# Patient Record
Sex: FEMALE | Race: WHITE | NOT HISPANIC OR LATINO | Employment: OTHER | URBAN - METROPOLITAN AREA
[De-identification: names, ages, dates, MRNs, and addresses within clinical notes are randomized per-mention and may not be internally consistent; named-entity substitution may affect disease eponyms.]

---

## 2018-09-26 ENCOUNTER — OFFICE VISIT (OUTPATIENT)
Dept: FAMILY MEDICINE CLINIC | Facility: CLINIC | Age: 68
End: 2018-09-26
Payer: COMMERCIAL

## 2018-09-26 VITALS
DIASTOLIC BLOOD PRESSURE: 72 MMHG | OXYGEN SATURATION: 98 % | HEIGHT: 65 IN | SYSTOLIC BLOOD PRESSURE: 120 MMHG | BODY MASS INDEX: 26.66 KG/M2 | WEIGHT: 160 LBS | HEART RATE: 73 BPM | TEMPERATURE: 97.9 F

## 2018-09-26 DIAGNOSIS — M54.2 NECK PAIN: ICD-10-CM

## 2018-09-26 DIAGNOSIS — Z00.00 WELL ADULT EXAM: ICD-10-CM

## 2018-09-26 DIAGNOSIS — R05.9 COUGH: Primary | ICD-10-CM

## 2018-09-26 DIAGNOSIS — M25.512 LEFT SHOULDER PAIN, UNSPECIFIED CHRONICITY: ICD-10-CM

## 2018-09-26 PROCEDURE — 1036F TOBACCO NON-USER: CPT | Performed by: FAMILY MEDICINE

## 2018-09-26 PROCEDURE — 1101F PT FALLS ASSESS-DOCD LE1/YR: CPT | Performed by: FAMILY MEDICINE

## 2018-09-26 PROCEDURE — 3008F BODY MASS INDEX DOCD: CPT | Performed by: FAMILY MEDICINE

## 2018-09-26 PROCEDURE — 99203 OFFICE O/P NEW LOW 30 MIN: CPT | Performed by: FAMILY MEDICINE

## 2018-09-26 RX ORDER — AZELASTINE 1 MG/ML
1 SPRAY, METERED NASAL 2 TIMES DAILY
Qty: 30 ML | Refills: 0 | Status: SHIPPED | OUTPATIENT
Start: 2018-09-26 | End: 2020-08-11 | Stop reason: ALTCHOICE

## 2018-09-26 NOTE — PROGRESS NOTES
Assessment/Plan:         Diagnoses and all orders for this visit:    Cough  -     azelastine (ASTELIN) 0 1 % nasal spray; 1 spray into each nostril 2 (two) times a day Use in each nostril as directed  -     CBC and differential; Future  -     Comprehensive metabolic panel; Future  -     Allergy Mold Panel, Complete; Future  -     Ambulatory referral to Pulmonology; Future    Neck pain  -     XR spine cervical 2 or 3 vw injury; Future  -     Lyme Antibody Profile with reflex to WB; Future    Left shoulder pain, unspecified chronicity  -     XR shoulder 2+ vw left; Future  -     Lyme Antibody Profile with reflex to WB; Future    Well adult exam  -     Comprehensive metabolic panel; Future  -     Lipid panel; Future  -     TSH, 3rd generation; Future         patient here with  with multiple complaints has not been seen by primary in a number of years, does not have established relationship with gyn   cough   Discussed multiple causative factors environmental and work related 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[employer], denies any allergic potential but will test to determine, with long history of cough past history of pneumonia will obtain pulmonary function test, will update lipid panel test for Lyme  Neck and shoulder pain  Denies any previous history of trauma has worked retail for number of years, discussed potential for arthritic complaints      Subjective:      Patient ID: Marlene Cerrato is a 80 y o  female      Has not had any primary in yrs , has just used [facility]'s   Here with    Has been having a cough for over 2 yrs maybe longer   Increases after taking a shower ,   Sometimes just a tickle a t other times more productive , or related to perfumes   Neg hx of asthma , seasonal allergies,   Neg smoker , never     Has taken a variety of otc meds from a variety of providers  Dog , cats  , = one cat left   Has history of pneumonia     Also with complaints of left side of neck has been going month  , hurtst to turn neck and at times lift shoulder to a certain point    does not have gyn provider  Has never had a colonoscopy  Denies history of Lyme  Denies history of rash lesion, swelling to joints negative history of rheumatoid      Cough   The problem has been waxing and waning  The cough is non-productive  Associated symptoms include postnasal drip  Pertinent negatives include no sore throat  She has tried OTC cough suppressant and prescription cough suppressant for the symptoms  The following portions of the patient's history were reviewed and updated as appropriate:   She has no past medical history on file  ,   does not have a problem list on file  ,   has no past surgical history on file  ,  family history is not on file  ,   reports that she has never smoked  She has never used smokeless tobacco  Her alcohol and drug histories are not on file  ,  has No Known Allergies         Review of Systems   Constitutional: Negative  HENT: Positive for postnasal drip  Negative for hearing loss, nosebleeds, sore throat and tinnitus  Eyes: Negative  Respiratory: Positive for cough  Cardiovascular: Negative  Gastrointestinal: Negative  Endocrine: Negative  Musculoskeletal: Positive for arthralgias and neck pain  Allergic/Immunologic: Negative  Neurological: Negative  Objective:  Vitals:    09/26/18 1704   BP: 120/72   Pulse: 73   Temp: 97 9 °F (36 6 °C)   SpO2: 98%      Physical Exam   Constitutional: She is oriented to person, place, and time  She appears well-developed and well-nourished  No distress  HENT:   Head: Normocephalic and atraumatic  Right Ear: External ear normal    Left Ear: External ear normal    Mouth/Throat: No oropharyngeal exudate  Eyes: Conjunctivae and EOM are normal  No scleral icterus  Neck: Normal range of motion  Neck supple  Cardiovascular: Normal rate, regular rhythm, normal heart sounds and intact distal pulses  No murmur heard    Superficial varicosities bilateral negative pedal edema   Pulmonary/Chest: Effort normal and breath sounds normal    Negative forced expiratory wheeze   Musculoskeletal: Normal range of motion  Lymphadenopathy:     She has no cervical adenopathy  Neurological: She is alert and oriented to person, place, and time  She has normal reflexes  Skin: Skin is warm and dry  Psychiatric: She has a normal mood and affect   Her behavior is normal  Judgment and thought content normal

## 2018-09-29 ENCOUNTER — HOSPITAL ENCOUNTER (OUTPATIENT)
Dept: RADIOLOGY | Facility: HOSPITAL | Age: 68
Discharge: HOME | End: 2018-09-29
Payer: COMMERCIAL

## 2018-09-29 ENCOUNTER — APPOINTMENT (OUTPATIENT)
Dept: LAB | Facility: HOSPITAL | Age: 68
End: 2018-09-29
Payer: COMMERCIAL

## 2018-09-29 DIAGNOSIS — M54.2 NECK PAIN: ICD-10-CM

## 2018-09-29 DIAGNOSIS — M25.512 LEFT SHOULDER PAIN, UNSPECIFIED CHRONICITY: ICD-10-CM

## 2018-09-29 DIAGNOSIS — R05.9 COUGH: ICD-10-CM

## 2018-09-29 DIAGNOSIS — Z00.00 WELL ADULT EXAM: ICD-10-CM

## 2018-09-29 LAB
ALBUMIN SERPL BCP-MCNC: 3.6 G/DL (ref 3.5–5)
ALP SERPL-CCNC: 78 U/L (ref 46–116)
ALT SERPL W P-5'-P-CCNC: 47 U/L (ref 12–78)
ANION GAP SERPL CALCULATED.3IONS-SCNC: 6 MMOL/L (ref 4–13)
AST SERPL W P-5'-P-CCNC: 29 U/L (ref 5–45)
BASOPHILS # BLD AUTO: 0.04 THOUSANDS/ΜL (ref 0–0.1)
BASOPHILS NFR BLD AUTO: 1 % (ref 0–1)
BILIRUB SERPL-MCNC: 0.5 MG/DL (ref 0.2–1)
BUN SERPL-MCNC: 13 MG/DL (ref 5–25)
CALCIUM SERPL-MCNC: 8.9 MG/DL (ref 8.3–10.1)
CHLORIDE SERPL-SCNC: 104 MMOL/L (ref 100–108)
CHOLEST SERPL-MCNC: 270 MG/DL (ref 50–200)
CO2 SERPL-SCNC: 30 MMOL/L (ref 21–32)
CREAT SERPL-MCNC: 0.82 MG/DL (ref 0.6–1.3)
EOSINOPHIL # BLD AUTO: 0.17 THOUSAND/ΜL (ref 0–0.61)
EOSINOPHIL NFR BLD AUTO: 2 % (ref 0–6)
ERYTHROCYTE [DISTWIDTH] IN BLOOD BY AUTOMATED COUNT: 12.4 % (ref 11.6–15.1)
GFR SERPL CREATININE-BSD FRML MDRD: 74 ML/MIN/1.73SQ M
GLUCOSE P FAST SERPL-MCNC: 106 MG/DL (ref 65–99)
HCT VFR BLD AUTO: 42.5 % (ref 34.8–46.1)
HDLC SERPL-MCNC: 46 MG/DL (ref 40–60)
HGB BLD-MCNC: 13.9 G/DL (ref 11.5–15.4)
IMM GRANULOCYTES # BLD AUTO: 0.04 THOUSAND/UL (ref 0–0.2)
IMM GRANULOCYTES NFR BLD AUTO: 1 % (ref 0–2)
LDLC SERPL CALC-MCNC: 170 MG/DL (ref 0–100)
LYMPHOCYTES # BLD AUTO: 2.37 THOUSANDS/ΜL (ref 0.6–4.47)
LYMPHOCYTES NFR BLD AUTO: 34 % (ref 14–44)
MCH RBC QN AUTO: 29.5 PG (ref 26.8–34.3)
MCHC RBC AUTO-ENTMCNC: 32.7 G/DL (ref 31.4–37.4)
MCV RBC AUTO: 90 FL (ref 82–98)
MONOCYTES # BLD AUTO: 0.54 THOUSAND/ΜL (ref 0.17–1.22)
MONOCYTES NFR BLD AUTO: 8 % (ref 4–12)
NEUTROPHILS # BLD AUTO: 3.85 THOUSANDS/ΜL (ref 1.85–7.62)
NEUTS SEG NFR BLD AUTO: 54 % (ref 43–75)
NONHDLC SERPL-MCNC: 224 MG/DL
NRBC BLD AUTO-RTO: 0 /100 WBCS
PLATELET # BLD AUTO: 260 THOUSANDS/UL (ref 149–390)
PMV BLD AUTO: 12.9 FL (ref 8.9–12.7)
POTASSIUM SERPL-SCNC: 3.8 MMOL/L (ref 3.5–5.3)
PROT SERPL-MCNC: 7.9 G/DL (ref 6.4–8.2)
RBC # BLD AUTO: 4.71 MILLION/UL (ref 3.81–5.12)
SODIUM SERPL-SCNC: 140 MMOL/L (ref 136–145)
TRIGL SERPL-MCNC: 268 MG/DL
TSH SERPL DL<=0.05 MIU/L-ACNC: 4.07 UIU/ML (ref 0.36–3.74)
WBC # BLD AUTO: 7.01 THOUSAND/UL (ref 4.31–10.16)

## 2018-09-29 PROCEDURE — 85025 COMPLETE CBC W/AUTO DIFF WBC: CPT

## 2018-09-29 PROCEDURE — 80053 COMPREHEN METABOLIC PANEL: CPT

## 2018-09-29 PROCEDURE — 72040 X-RAY EXAM NECK SPINE 2-3 VW: CPT

## 2018-09-29 PROCEDURE — 36415 COLL VENOUS BLD VENIPUNCTURE: CPT

## 2018-09-29 PROCEDURE — 80061 LIPID PANEL: CPT

## 2018-09-29 PROCEDURE — 86618 LYME DISEASE ANTIBODY: CPT

## 2018-09-29 PROCEDURE — 73030 X-RAY EXAM OF SHOULDER: CPT

## 2018-09-29 PROCEDURE — 86003 ALLG SPEC IGE CRUDE XTRC EA: CPT

## 2018-09-29 PROCEDURE — 84443 ASSAY THYROID STIM HORMONE: CPT

## 2018-09-30 LAB
B BURGDOR IGG SER IA-ACNC: 0.24
B BURGDOR IGM SER IA-ACNC: 0.13

## 2018-10-01 ENCOUNTER — TELEPHONE (OUTPATIENT)
Dept: FAMILY MEDICINE CLINIC | Facility: CLINIC | Age: 68
End: 2018-10-01

## 2018-10-02 LAB
A ALTERNATA IGE QN: <0.1 KU/L
A FUMIGATUS IGE QN: <0.1 KU/L
A PULLULANS IGE QN: <0.1 KU/L
C ALBICANS IGE QN: <0.1 KU/L
C HERBARUM IGE QN: <0.1 KU/L
E PURPURASCENS IGE QN: <0.1 KU/L
FUSARIUM PROLIFERATUM: <0.1 KU/L
Lab: NORMAL
M RACEMOSUS IGE QN: <0.1 KU/L
PENICILLIUM CHRYSOGENUM: <0.1 KU/L
PHOMA BETAE: <0.1 KU/L
SETOMELANOMMA ROSTRATA: <0.1 KU/L
STEMPHYLIUM HERBARUM: <0.1 KU/L

## 2018-10-05 DIAGNOSIS — M54.2 NECK PAIN: ICD-10-CM

## 2018-10-05 DIAGNOSIS — M25.512 LEFT SHOULDER PAIN, UNSPECIFIED CHRONICITY: Primary | ICD-10-CM

## 2018-10-10 ENCOUNTER — TELEPHONE (OUTPATIENT)
Dept: FAMILY MEDICINE CLINIC | Facility: CLINIC | Age: 68
End: 2018-10-10

## 2018-10-10 NOTE — TELEPHONE ENCOUNTER
Pulmonary is booked up, pt can not get an appt until December 10th   do you want her seen earlier?     They put her on a cancellation list

## 2018-10-12 NOTE — TELEPHONE ENCOUNTER
Not sure which provider in the system ?       Merlinda Mems   ( [phone] ) is the area  Or Cyn Lo ( [phone])

## 2018-10-26 ENCOUNTER — TELEPHONE (OUTPATIENT)
Dept: FAMILY MEDICINE CLINIC | Facility: CLINIC | Age: 68
End: 2018-10-26

## 2018-10-26 NOTE — TELEPHONE ENCOUNTER
Patient called with a cc of having pulled a tick off her side  There is a red spot and the tick was very tiny    Sent patient to urgent care and patient accepts

## 2018-11-06 ENCOUNTER — TRANSCRIBE ORDERS (OUTPATIENT)
Dept: ADMINISTRATIVE | Facility: HOSPITAL | Age: 68
End: 2018-11-06

## 2018-11-06 ENCOUNTER — HOSPITAL ENCOUNTER (OUTPATIENT)
Dept: RADIOLOGY | Facility: HOSPITAL | Age: 68
Discharge: HOME | End: 2018-11-06
Payer: COMMERCIAL

## 2018-11-06 DIAGNOSIS — R05.9 COUGH: ICD-10-CM

## 2018-11-06 DIAGNOSIS — J32.9 SINUSITIS, UNSPECIFIED CHRONICITY, UNSPECIFIED LOCATION: ICD-10-CM

## 2018-11-06 DIAGNOSIS — J32.9 SINUSITIS, UNSPECIFIED CHRONICITY, UNSPECIFIED LOCATION: Primary | ICD-10-CM

## 2018-11-06 PROCEDURE — 71046 X-RAY EXAM CHEST 2 VIEWS: CPT

## 2018-11-06 PROCEDURE — 70220 X-RAY EXAM OF SINUSES: CPT

## 2019-06-06 ENCOUNTER — OFFICE VISIT (OUTPATIENT)
Dept: FAMILY MEDICINE CLINIC | Facility: CLINIC | Age: 69
End: 2019-06-06
Payer: COMMERCIAL

## 2019-06-06 VITALS
WEIGHT: 162 LBS | HEART RATE: 62 BPM | SYSTOLIC BLOOD PRESSURE: 124 MMHG | DIASTOLIC BLOOD PRESSURE: 82 MMHG | HEIGHT: 65 IN | BODY MASS INDEX: 26.99 KG/M2 | OXYGEN SATURATION: 98 %

## 2019-06-06 DIAGNOSIS — R42 VERTIGO: Primary | ICD-10-CM

## 2019-06-06 PROCEDURE — 1036F TOBACCO NON-USER: CPT | Performed by: FAMILY MEDICINE

## 2019-06-06 PROCEDURE — 3008F BODY MASS INDEX DOCD: CPT | Performed by: FAMILY MEDICINE

## 2019-06-06 PROCEDURE — 99213 OFFICE O/P EST LOW 20 MIN: CPT | Performed by: FAMILY MEDICINE

## 2019-06-06 RX ORDER — MECLIZINE HYDROCHLORIDE 25 MG/1
TABLET ORAL
Qty: 90 TABLET | Refills: 1 | Status: SHIPPED | OUTPATIENT
Start: 2019-06-06 | End: 2020-09-11

## 2019-06-06 RX ORDER — BUDESONIDE AND FORMOTEROL FUMARATE DIHYDRATE 160; 4.5 UG/1; UG/1
2 AEROSOL RESPIRATORY (INHALATION) 2 TIMES DAILY
Refills: 5 | COMMUNITY
Start: 2019-03-16 | End: 2020-08-11 | Stop reason: ALTCHOICE

## 2019-06-06 RX ORDER — MONTELUKAST SODIUM 10 MG/1
10 TABLET ORAL
COMMUNITY
Start: 2018-10-31 | End: 2020-09-11

## 2020-06-29 ENCOUNTER — OFFICE VISIT (OUTPATIENT)
Dept: FAMILY MEDICINE CLINIC | Facility: CLINIC | Age: 70
End: 2020-06-29
Payer: COMMERCIAL

## 2020-06-29 VITALS
SYSTOLIC BLOOD PRESSURE: 114 MMHG | HEART RATE: 78 BPM | BODY MASS INDEX: 28.12 KG/M2 | HEIGHT: 65 IN | DIASTOLIC BLOOD PRESSURE: 82 MMHG | OXYGEN SATURATION: 98 % | WEIGHT: 168.8 LBS | TEMPERATURE: 98.6 F

## 2020-06-29 DIAGNOSIS — Z13.220 ENCOUNTER FOR SCREENING FOR LIPID DISORDER: ICD-10-CM

## 2020-06-29 DIAGNOSIS — R20.2 NUMBNESS AND TINGLING OF FOOT: ICD-10-CM

## 2020-06-29 DIAGNOSIS — Z12.11 SCREENING FOR COLON CANCER: ICD-10-CM

## 2020-06-29 DIAGNOSIS — R20.0 NUMBNESS AND TINGLING OF FOOT: ICD-10-CM

## 2020-06-29 DIAGNOSIS — I83.93 VARICOSE VEINS OF BOTH LOWER EXTREMITIES, UNSPECIFIED WHETHER COMPLICATED: Primary | ICD-10-CM

## 2020-06-29 DIAGNOSIS — Z12.39 SCREENING FOR BREAST CANCER: ICD-10-CM

## 2020-06-29 DIAGNOSIS — Z11.59 ENCOUNTER FOR HEPATITIS C SCREENING TEST FOR LOW RISK PATIENT: ICD-10-CM

## 2020-06-29 PROCEDURE — 1160F RVW MEDS BY RX/DR IN RCRD: CPT | Performed by: FAMILY MEDICINE

## 2020-06-29 PROCEDURE — 3288F FALL RISK ASSESSMENT DOCD: CPT | Performed by: FAMILY MEDICINE

## 2020-06-29 PROCEDURE — 1036F TOBACCO NON-USER: CPT | Performed by: FAMILY MEDICINE

## 2020-06-29 PROCEDURE — 99214 OFFICE O/P EST MOD 30 MIN: CPT | Performed by: FAMILY MEDICINE

## 2020-06-29 PROCEDURE — 3008F BODY MASS INDEX DOCD: CPT | Performed by: FAMILY MEDICINE

## 2020-06-29 PROCEDURE — 1101F PT FALLS ASSESS-DOCD LE1/YR: CPT | Performed by: FAMILY MEDICINE

## 2020-07-15 LAB
ALBUMIN SERPL-MCNC: 4 G/DL (ref 3.6–5.1)
ALBUMIN/GLOB SERPL: 1.3 (CALC) (ref 1–2.5)
ALP SERPL-CCNC: 73 U/L (ref 37–153)
ALT SERPL-CCNC: 44 U/L (ref 6–29)
AST SERPL-CCNC: 27 U/L (ref 10–35)
BASOPHILS # BLD AUTO: 43 CELLS/UL (ref 0–200)
BASOPHILS NFR BLD AUTO: 0.6 %
BILIRUB SERPL-MCNC: 0.4 MG/DL (ref 0.2–1.2)
BUN SERPL-MCNC: 17 MG/DL (ref 7–25)
BUN/CREAT SERPL: ABNORMAL (CALC) (ref 6–22)
CALCIUM SERPL-MCNC: 9.3 MG/DL (ref 8.6–10.4)
CHLORIDE SERPL-SCNC: 107 MMOL/L (ref 98–110)
CHOLEST SERPL-MCNC: 231 MG/DL
CHOLEST/HDLC SERPL: 5.6 (CALC)
CO2 SERPL-SCNC: 26 MMOL/L (ref 20–32)
CREAT SERPL-MCNC: 0.82 MG/DL (ref 0.5–0.99)
EOSINOPHIL # BLD AUTO: 173 CELLS/UL (ref 15–500)
EOSINOPHIL NFR BLD AUTO: 2.4 %
ERYTHROCYTE [DISTWIDTH] IN BLOOD BY AUTOMATED COUNT: 12.7 % (ref 11–15)
GLOBULIN SER CALC-MCNC: 3.1 G/DL (CALC) (ref 1.9–3.7)
GLUCOSE SERPL-MCNC: 109 MG/DL (ref 65–99)
HCT VFR BLD AUTO: 40.5 % (ref 35–45)
HCV AB S/CO SERPL IA: 0.01
HCV AB SERPL QL IA: NORMAL
HDLC SERPL-MCNC: 41 MG/DL
HGB BLD-MCNC: 13.3 G/DL (ref 11.7–15.5)
LDLC SERPL CALC-MCNC: 142 MG/DL (CALC)
LYMPHOCYTES # BLD AUTO: 2232 CELLS/UL (ref 850–3900)
LYMPHOCYTES NFR BLD AUTO: 31 %
MCH RBC QN AUTO: 29.4 PG (ref 27–33)
MCHC RBC AUTO-ENTMCNC: 32.8 G/DL (ref 32–36)
MCV RBC AUTO: 89.4 FL (ref 80–100)
MONOCYTES # BLD AUTO: 475 CELLS/UL (ref 200–950)
MONOCYTES NFR BLD AUTO: 6.6 %
NEUTROPHILS # BLD AUTO: 4277 CELLS/UL (ref 1500–7800)
NEUTROPHILS NFR BLD AUTO: 59.4 %
NONHDLC SERPL-MCNC: 190 MG/DL (CALC)
PLATELET # BLD AUTO: 232 THOUSAND/UL (ref 140–400)
PMV BLD REES-ECKER: 14 FL (ref 7.5–12.5)
POTASSIUM SERPL-SCNC: 4.2 MMOL/L (ref 3.5–5.3)
PROT SERPL-MCNC: 7.1 G/DL (ref 6.1–8.1)
RBC # BLD AUTO: 4.53 MILLION/UL (ref 3.8–5.1)
SL AMB EGFR AFRICAN AMERICAN: 85 ML/MIN/1.73M2
SL AMB EGFR NON AFRICAN AMERICAN: 73 ML/MIN/1.73M2
SODIUM SERPL-SCNC: 140 MMOL/L (ref 135–146)
TRIGL SERPL-MCNC: 309 MG/DL
TSH SERPL-ACNC: 2.77 MIU/L (ref 0.4–4.5)
WBC # BLD AUTO: 7.2 THOUSAND/UL (ref 3.8–10.8)

## 2020-07-21 ENCOUNTER — OFFICE VISIT (OUTPATIENT)
Dept: FAMILY MEDICINE CLINIC | Facility: CLINIC | Age: 70
End: 2020-07-21
Payer: COMMERCIAL

## 2020-07-21 VITALS
WEIGHT: 168 LBS | OXYGEN SATURATION: 98 % | RESPIRATION RATE: 14 BRPM | TEMPERATURE: 97 F | HEART RATE: 71 BPM | BODY MASS INDEX: 27.99 KG/M2 | HEIGHT: 65 IN | SYSTOLIC BLOOD PRESSURE: 125 MMHG | DIASTOLIC BLOOD PRESSURE: 75 MMHG

## 2020-07-21 DIAGNOSIS — E78.2 MIXED HYPERLIPIDEMIA: Primary | ICD-10-CM

## 2020-07-21 DIAGNOSIS — R20.0 NUMBNESS AND TINGLING OF FOOT: ICD-10-CM

## 2020-07-21 DIAGNOSIS — M25.571 RIGHT ANKLE PAIN, UNSPECIFIED CHRONICITY: ICD-10-CM

## 2020-07-21 DIAGNOSIS — R20.2 NUMBNESS AND TINGLING OF FOOT: ICD-10-CM

## 2020-07-21 PROCEDURE — 99214 OFFICE O/P EST MOD 30 MIN: CPT | Performed by: FAMILY MEDICINE

## 2020-07-21 PROCEDURE — 3008F BODY MASS INDEX DOCD: CPT | Performed by: FAMILY MEDICINE

## 2020-07-21 PROCEDURE — 1160F RVW MEDS BY RX/DR IN RCRD: CPT | Performed by: FAMILY MEDICINE

## 2020-07-21 PROCEDURE — 1036F TOBACCO NON-USER: CPT | Performed by: FAMILY MEDICINE

## 2020-07-21 NOTE — PROGRESS NOTES
Falls Plan of Care: balance, strength, and gait training instructions were provided  Assessment/Plan:     Chronic Problems:  No problem-specific Assessment & Plan notes found for this encounter  Visit Diagnosis:  Diagnoses and all orders for this visit:    Mixed hyperlipidemia  -     choline fenofibrate (TRILIPIX) 135 MG capsule; Take 1 capsule (135 mg total) by mouth daily    Numbness and tingling of foot    Right ankle pain, unspecified chronicity  -     Ambulatory referral to Podiatry; Future    Other orders  -     Cancel: PNEUMOCOCCAL CONJUGATE VACCINE 13-VALENT GREATER THAN 6 MONTHS      Hyperlipidemia/ triglycerides   discussed in reviewed laboratories at length, reviewed previous and current lipid profiles, stressed importance of dietary modifications, decrease carbs no sugar products, no sugar drinks recommended increasing fiber intake, Omega threes, recommended  fenofibrate, recheck lipid panel 4 months   numbness tingling right foot right ankle   referral placed for evaluation with Podiatry      Subjective:    Patient ID: Alejo Lilly is a 72 y o  female       Here to follow-up on labs   diet, having in carbohydrates enjoys bread products, only drinks sugar products soda, fried foods   supplements taking none   herbal products none   previously advised decrease cholesterol intake in regards to triglycerides, has not  Change diet   would like to know  If ice cream is okay, bread= no, denies excess alcohol, social only   exercise program none   has yet to schedule evaluation in regard to swelling of right ankle, appointment schedule distant   offer pneumococcal vaccine refused        The following portions of the patient's history were reviewed and updated as appropriate: allergies, current medications, past family history, past medical history, past social history, past surgical history and problem list     Review of Systems   Constitutional: Negative for appetite change, chills, fever and unexpected weight change  HENT: Negative for congestion, dental problem, ear pain, hearing loss, postnasal drip, rhinorrhea, sinus pressure, sinus pain, sneezing, sore throat, tinnitus and voice change  Eyes: Negative for visual disturbance  Respiratory: Negative for apnea, cough, chest tightness and shortness of breath  Cardiovascular: Negative for chest pain, palpitations and leg swelling  Gastrointestinal: Negative for abdominal pain, blood in stool, constipation, diarrhea, nausea and vomiting  Endocrine: Negative for cold intolerance, heat intolerance, polydipsia, polyphagia and polyuria  Genitourinary: Negative for decreased urine volume, difficulty urinating, dysuria, frequency and hematuria  Musculoskeletal: Negative for arthralgias, back pain, gait problem, joint swelling and myalgias  Skin: Negative for color change, rash and wound  Allergic/Immunologic: Negative for environmental allergies and food allergies  Neurological: Negative for dizziness, syncope, weakness, light-headedness, numbness and headaches  Hematological: Negative for adenopathy  Does not bruise/bleed easily  Psychiatric/Behavioral: Negative for sleep disturbance and suicidal ideas  The patient is not nervous/anxious            /80 (BP Location: Left arm, Patient Position: Sitting, Cuff Size: Adult)   Pulse 71   Temp (!) 97 °F (36 1 °C)   Resp 14   Ht 5' 5" (1 651 m)   Wt 76 2 kg (168 lb)   SpO2 98%   BMI 27 96 kg/m²   Social History     Socioeconomic History    Marital status: /Civil Union     Spouse name: Not on file    Number of children: Not on file    Years of education: Not on file    Highest education level: Not on file   Occupational History    Not on file   Social Needs    Financial resource strain: Not on file    Food insecurity:     Worry: Not on file     Inability: Not on file    Transportation needs:     Medical: Not on file     Non-medical: Not on file   Tobacco Use  Smoking status: Never Smoker    Smokeless tobacco: Never Used   Substance and Sexual Activity    Alcohol use: Not on file    Drug use: Not on file    Sexual activity: Not on file   Lifestyle    Physical activity:     Days per week: Not on file     Minutes per session: Not on file    Stress: Not on file   Relationships    Social connections:     Talks on phone: Not on file     Gets together: Not on file     Attends Latter-day service: Not on file     Active member of club or organization: Not on file     Attends meetings of clubs or organizations: Not on file     Relationship status: Not on file    Intimate partner violence:     Fear of current or ex partner: Not on file     Emotionally abused: Not on file     Physically abused: Not on file     Forced sexual activity: Not on file   Other Topics Concern    Not on file   Social History Narrative    Not on file     History reviewed  No pertinent past medical history  History reviewed  No pertinent family history  History reviewed  No pertinent surgical history      Current Outpatient Medications:     azelastine (ASTELIN) 0 1 % nasal spray, 1 spray into each nostril 2 (two) times a day Use in each nostril as directed (Patient not taking: Reported on 6/6/2019), Disp: 30 mL, Rfl: 0    choline fenofibrate (TRILIPIX) 135 MG capsule, Take 1 capsule (135 mg total) by mouth daily, Disp: 30 capsule, Rfl: 3    meclizine (ANTIVERT) 25 mg tablet, May take 1 pill at bedtime for vertigo p r n  (Patient not taking: Reported on 6/29/2020), Disp: 90 tablet, Rfl: 1    montelukast (SINGULAIR) 10 mg tablet, Take 10 mg by mouth, Disp: , Rfl:     SYMBICORT 160-4 5 MCG/ACT inhaler, Inhale 2 puffs 2 (two) times a day, Disp: , Rfl: 5    No Known Allergies       Lab Review   Orders Only on 07/14/2020   Component Date Value    Total Cholesterol 07/14/2020 231*    HDL 07/14/2020 41*    Triglycerides 07/14/2020 309*    LDL Calculated 07/14/2020 142*    Chol HDLC Ratio 07/14/2020 5 6*    Non-HDL Cholesterol 07/14/2020 190*    Glucose, Random 07/14/2020 109*    BUN 07/14/2020 17     Creatinine 07/14/2020 0 82     eGFR Non  07/14/2020 73     eGFR  07/14/2020 85     SL AMB BUN/CREATININE RA* 52/54/6950 NOT APPLICABLE     Sodium 50/60/6857 140     Potassium 07/14/2020 4 2     Chloride 07/14/2020 107     CO2 07/14/2020 26     Calcium 07/14/2020 9 3     Protein, Total 07/14/2020 7 1     Albumin 07/14/2020 4 0     Globulin 07/14/2020 3 1     Albumin/Globulin Ratio 07/14/2020 1 3     TOTAL BILIRUBIN 07/14/2020 0 4     Alkaline Phosphatase 07/14/2020 73     AST 07/14/2020 27     ALT 07/14/2020 44*    White Blood Cell Count 07/14/2020 7 2     Red Blood Cell Count 07/14/2020 4 53     Hemoglobin 07/14/2020 13 3     HCT 07/14/2020 40 5     MCV 07/14/2020 89 4     MCH 07/14/2020 29 4     MCHC 07/14/2020 32 8     RDW 07/14/2020 12 7     Platelet Count 19/63/1147 232     SL AMB MPV 07/14/2020 14 0*    Neutrophils (Absolute) 07/14/2020 4,277     Lymphocytes (Absolute) 07/14/2020 2,232     Monocytes (Absolute) 07/14/2020 475     Eosinophils (Absolute) 07/14/2020 173     Basophils ABS 07/14/2020 43     Neutrophils 07/14/2020 59 4     Lymphocytes 07/14/2020 31 0     Monocytes 07/14/2020 6 6     Eosinophils 07/14/2020 2 4     Basophils PCT 07/14/2020 0 6     HEP C AB 07/14/2020 NON-REACTIVE     Signal to Cut-Off 07/14/2020 0 01     TSH 07/14/2020 2 77         Imaging: No results found  Objective:     Physical Exam   Constitutional: She is oriented to person, place, and time  She appears well-developed and well-nourished  No distress  HENT:   Head: Normocephalic and atraumatic  Right Ear: External ear normal    Left Ear: External ear normal    Eyes: Conjunctivae and EOM are normal  No scleral icterus  Neck: Normal range of motion  Neck supple  Cardiovascular: Normal rate, regular rhythm and normal heart sounds  Pulmonary/Chest: Effort normal and breath sounds normal    Musculoskeletal: Normal range of motion  Neurological: She is alert and oriented to person, place, and time  She has normal reflexes  Skin: Skin is warm and dry  Psychiatric: She has a normal mood and affect  Her behavior is normal  Judgment and thought content normal          There are no Patient Instructions on file for this visit  BOBBY Gabriel    Portions of the record may have been created with voice recognition software  Occasional wrong word or "sound a like" substitutions may have occurred due to the inherent limitations of voice recognition software  Read the chart carefully and recognize, using context, where substitutions have occurred

## 2020-08-03 DIAGNOSIS — E78.2 MIXED HYPERLIPIDEMIA: ICD-10-CM

## 2020-08-03 NOTE — TELEPHONE ENCOUNTER
Can you have change the Rx to be sent to [facility] in [facility]  Her 's insurance does not cover the Rx at [facility]

## 2020-08-06 ENCOUNTER — TELEPHONE (OUTPATIENT)
Dept: NEUROLOGY | Facility: CLINIC | Age: 70
End: 2020-08-06

## 2020-08-10 NOTE — TELEPHONE ENCOUNTER
Rescheduled patient as per note below  Dr Rena Ortiz only had 50 min slot for a NP appointment for Tuesday 08/11/2020

## 2020-08-11 ENCOUNTER — CONSULT (OUTPATIENT)
Dept: NEUROLOGY | Facility: CLINIC | Age: 70
End: 2020-08-11
Payer: COMMERCIAL

## 2020-08-11 VITALS
HEART RATE: 76 BPM | DIASTOLIC BLOOD PRESSURE: 80 MMHG | BODY MASS INDEX: 27.82 KG/M2 | SYSTOLIC BLOOD PRESSURE: 120 MMHG | WEIGHT: 167 LBS | HEIGHT: 65 IN

## 2020-08-11 DIAGNOSIS — G62.9 PERIPHERAL POLYNEUROPATHY: ICD-10-CM

## 2020-08-11 DIAGNOSIS — R20.2 NUMBNESS AND TINGLING OF FOOT: ICD-10-CM

## 2020-08-11 DIAGNOSIS — R20.0 NUMBNESS AND TINGLING OF FOOT: ICD-10-CM

## 2020-08-11 DIAGNOSIS — G40.111 PARTIAL SYMPTOMATIC EPILEPSY WITH SIMPLE PARTIAL SEIZURES, INTRACTABLE, WITH STATUS EPILEPTICUS (HCC): Primary | ICD-10-CM

## 2020-08-11 PROCEDURE — 1160F RVW MEDS BY RX/DR IN RCRD: CPT | Performed by: PSYCHIATRY & NEUROLOGY

## 2020-08-11 PROCEDURE — 99204 OFFICE O/P NEW MOD 45 MIN: CPT | Performed by: PSYCHIATRY & NEUROLOGY

## 2020-08-11 PROCEDURE — 3008F BODY MASS INDEX DOCD: CPT | Performed by: PSYCHIATRY & NEUROLOGY

## 2020-08-11 PROCEDURE — 1036F TOBACCO NON-USER: CPT | Performed by: PSYCHIATRY & NEUROLOGY

## 2020-08-11 NOTE — PROGRESS NOTES
Bruce Ruiz is a 72 y o  female who presents today with symptoms of numbness and tingling in the leg with an odd sensation of the right side    Assessment:  1  Partial symptomatic epilepsy with simple partial seizures, intractable, with status epilepticus ([facility] [address] )    2  Numbness and tingling of foot    3  Peripheral polyneuropathy        Plan:  MRI brain with without contrast  EEG  Blood work  EMG right upper lower extremity  Follow-up afterwards    Discussion:  Silver Hernandez reports symptoms of tingling numbness in the distal right lower extremity for approximately 1 year with episodes of an odd sensation that radiate up the leg and sometimes as high up is into her neck on the right side that she describes as a hurricane sensation that will sometimes last up to a couple of minutes, sometimes associated with a heavy feeling in the right leg  Her exam today is remarkable for a stocking glove type of decreased sensation suggestive of peripheral neuropathy (she has had high glucoses in recent past) however she has some hyperreflexia on the right side with an upgoing toe suggestive of something more central   Given this I have recommended an MRI of the head with and without contrast, an EEG as well as an EMG of the right arm and leg and blood work  I will see her back in follow-up on these are completed      Subjective:    CORDELL  Silver Hernandez is a right-handed woman who presents with the above complaints  She states for at least a year she has been aware of some numbness and tingling predominantly in the right foot and ankle region  She states that the numbness and tingling has not changed much over the last year     She denies any pain in the back leg or foot associated with these symptoms  She states on rare occasion she notes some tingling in the left leg but most of the symptoms her persistently on the right side    She states in addition to the numbness she gets an odd sensation that she describes like Ravi Lee that will begin in her foot and travel up her leg  She states that usually it stops at the buttocks area but a couple of times it is traveled higher up into her back and she states on 2 occasions it traveled all the way up her right side to her neck  She states that lasts for minutes and then resolves  She states that sometimes when it occurs she has a very heavy feeling in the right leg like she can not lift it  She states that sometimes she gets this heavy feeling not associated with this odd sensation  She notes no change in level of consciousness when it occurs and notes no weakness in her arms or her left leg  Past Medical History:   Diagnosis Date    Hypercholesteremia     Pneumonia        Family History:  Family History   Problem Relation Age of Onset    No Known Problems Mother     Lung cancer Father     No Known Problems Sister        Past Surgical History:  Past Surgical History:   Procedure Laterality Date    NO PAST SURGERIES      TUBAL LIGATION         Social History:   reports that she has never smoked  She has never used smokeless tobacco  She reports previous alcohol use  Allergies:  Patient has no known allergies  Current Outpatient Medications:     choline fenofibrate (TRILIPIX) 135 MG capsule, Take 1 capsule (135 mg total) by mouth daily, Disp: 30 capsule, Rfl: 3    montelukast (SINGULAIR) 10 mg tablet, Take 10 mg by mouth, Disp: , Rfl:     meclizine (ANTIVERT) 25 mg tablet, May take 1 pill at bedtime for vertigo p r n  (Patient not taking: Reported on 6/29/2020), Disp: 90 tablet, Rfl: 1    I have reviewed the past medical, social and family history, current medications, allergies, vitals, review of systems and updated this information as appropriate today     Objective:    Vitals:  Blood pressure 120/80, pulse 76, height 5' 5" (1 651 m), weight 75 8 kg (167 lb)  Physical Exam    Neurological Exam    GENERAL:  Cooperative in no acute distress   Well-developed and well-nourished    HEAD and NECK   Head is atraumatic normocephalic with no lesions or masses  Neck is supple with full range of motion    CARDIOVASCULAR  Carotid Arteries-no carotid bruits  NEUROLOGIC:  Mental Status-the patient is awake alert and oriented without aphasia or apraxia  Cranial Nerves: Visual fields are full to confrontation  Discs are flat  Extraocular movements are full without nystagmus  Pupils are 2-1/2 mm and reactive  Face is symmetrical to light touch  Movements of facial expression move symmetrically  Hearing is normal to finger rub bilaterally  Soft palate lifts symmetrically  Shoulder shrug is symmetrical  Tongue is midline without atrophy  Motor: No drift is noted on arm extension  Strength is full in the upper and lower extremities with normal bulk and tone  Sensory: Intact to vibratory sensation in the upper and lower extremities bilaterally while there is decreased temperature sensation in the distal lower extremities to above the knees bilaterally and in the distal forearms to about the elbow bilaterally    Cortical function is intact  Coordination: Finger to nose testing is performed accurately  Romberg is negative  Gait reveals a normal base with symmetrical arm swing  Tandem walk is normal   Reflexes:  2+ in the right biceps, triceps, brachioradialis, knee jerk and ankle jerk regions, 2-on the left  Toes are downgoing on the left and upgoing on the right            ROS:    Review of Systems   Constitutional: Negative  Negative for appetite change and fever  HENT: Negative  Negative for hearing loss, tinnitus, trouble swallowing and voice change  Eyes: Negative  Negative for photophobia and pain  Respiratory: Positive for cough  Negative for shortness of breath  Cardiovascular: Negative  Negative for palpitations  Gastrointestinal: Negative  Negative for nausea and vomiting  Endocrine: Negative  Negative for cold intolerance  Genitourinary: Negative  Negative for dysuria, frequency and urgency  Musculoskeletal: Negative  Negative for back pain, gait problem, myalgias and neck pain  Skin: Negative  Negative for rash  Neurological: Positive for numbness (Right foot ankle sometimes in left leg)  Negative for dizziness, tremors, seizures, syncope, facial asymmetry, speech difficulty, weakness, light-headedness and headaches  Hematological: Negative  Does not bruise/bleed easily  Psychiatric/Behavioral: Negative  Negative for confusion, hallucinations and sleep disturbance

## 2020-09-02 ENCOUNTER — HOSPITAL ENCOUNTER (OUTPATIENT)
Dept: RADIOLOGY | Age: 70
Discharge: HOME | End: 2020-09-02
Payer: COMMERCIAL

## 2020-09-02 DIAGNOSIS — G40.111 PARTIAL SYMPTOMATIC EPILEPSY WITH SIMPLE PARTIAL SEIZURES, INTRACTABLE, WITH STATUS EPILEPTICUS (HCC): ICD-10-CM

## 2020-09-02 PROCEDURE — 70553 MRI BRAIN STEM W/O & W/DYE: CPT

## 2020-09-02 PROCEDURE — G1004 CDSM NDSC: HCPCS

## 2020-09-02 PROCEDURE — A9585 GADOBUTROL INJECTION: HCPCS | Performed by: PSYCHIATRY & NEUROLOGY

## 2020-09-02 RX ADMIN — GADOBUTROL 7 ML: 604.72 INJECTION INTRAVENOUS at 15:24

## 2020-09-03 ENCOUNTER — TELEPHONE (OUTPATIENT)
Dept: NEUROLOGY | Facility: CLINIC | Age: 70
End: 2020-09-03

## 2020-09-03 DIAGNOSIS — G40.109 PARTIAL SYMPTOMATIC EPILEPSY WITH SIMPLE PARTIAL SEIZURES, NOT INTRACTABLE, WITHOUT STATUS EPILEPTICUS (HCC): ICD-10-CM

## 2020-09-03 DIAGNOSIS — G93.89 CEREBRAL MASS: Primary | ICD-10-CM

## 2020-09-03 RX ORDER — LEVETIRACETAM 500 MG/1
TABLET ORAL
Qty: 60 TABLET | Refills: 5 | Status: ON HOLD | OUTPATIENT
Start: 2020-09-03 | End: 2020-10-28

## 2020-09-03 NOTE — TELEPHONE ENCOUNTER
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

## 2020-09-11 ENCOUNTER — CONSULT (OUTPATIENT)
Dept: NEUROSURGERY | Facility: CLINIC | Age: 70
End: 2020-09-11
Payer: COMMERCIAL

## 2020-09-11 VITALS
HEART RATE: 67 BPM | HEIGHT: 65 IN | RESPIRATION RATE: 16 BRPM | SYSTOLIC BLOOD PRESSURE: 125 MMHG | WEIGHT: 165 LBS | BODY MASS INDEX: 27.49 KG/M2 | TEMPERATURE: 98.2 F | DIASTOLIC BLOOD PRESSURE: 71 MMHG

## 2020-09-11 DIAGNOSIS — G93.89 CEREBRAL MASS: ICD-10-CM

## 2020-09-11 DIAGNOSIS — G93.6 CEREBRAL EDEMA (HCC): ICD-10-CM

## 2020-09-11 DIAGNOSIS — G93.5 BRAIN COMPRESSION (HCC): ICD-10-CM

## 2020-09-11 DIAGNOSIS — D32.9 MENINGIOMA (HCC): ICD-10-CM

## 2020-09-11 DIAGNOSIS — D49.6 BRAIN TUMOR (HCC): Primary | ICD-10-CM

## 2020-09-11 PROCEDURE — 99205 OFFICE O/P NEW HI 60 MIN: CPT | Performed by: NEUROLOGICAL SURGERY

## 2020-09-11 RX ORDER — MECLIZINE HYDROCHLORIDE 25 MG/1
25 TABLET ORAL
COMMUNITY
End: 2020-11-09 | Stop reason: HOSPADM

## 2020-09-11 NOTE — PROGRESS NOTES
Neurosurgery Office Note  Kwasi Alvarez 72 y o  female MRN: [id number]      Assessment/Plan      Diagnoses and all orders for this visit:    Brain tumor [facility])    Cerebral mass  -     Ambulatory referral to Neurosurgery    Brain compression [facility])    Cerebral edema ([facility] [address] )    Meningioma ([facility] )         Discussion:    A 64-year-old woman who for about a year has had episodes of numbness and tingling in her right foot  Since these began, they have begun to involve the more proximal lower leg, then the proximal upper leg  More recently they have ascended into her lateral trunk, and even up to her neck and jaw most recently  They have not involved her arm  They are numbness and tingling she states  At times when they occurred she will have some heaviness in her foot or leg  Her leg does not shake  These can occur every other week or so, however the day of her recent MRI, see below, she had about 5 of these in 1 day  They can last 5 or 10 minutes  She denies headache  She has denied any loss of consciousness  She was seen by Dr Robyn John of Neurology, and sent for MRI brain, EMG and EEG  MRI results as below  She at that time was placed on Keppra, and has had no episodes since  She has yet to have the EEG, EMG  On exam neurologically normal, nonfocal with no pronator drift, no weakness etc, no extinction  Roosevelt was 24/30, with errors for language and fluency, attention, and for recall  MRI scan brain demonstrates what appears to be meningioma, frontoparietal left, 3 cm in diameter, with considerable brain compression, and adjacent vasogenic cerebral edema  It appears to be convexity based although does abut the superior sagittal sinus  I reviewed with the patient and her daughter her imaging findings    I discussed the natural history of meningiomas and options for management ranging from conservative to surgical   Because of the size and the symptomatic nature of this mass, I recommended surgery  I do not feel it appropriate for observation radiation alone  I reviewed in considerable detail the process of surgery, the expected benefits, and the attendant risks, including but not limited to infection, bleeding, seizures, VTE, transient or permanent neurologic dysfunction, CSF leak, etc   I discussed with her ex affected hospital stay, expected recovery, postop restrictions/limitations, etc   I answered questions to her satisfaction  Understandably, the patient was quite overwhelmed with this scenario  She wishes to discuss this further with her   She may well wished to return to ask further questions as well as return with her  so he may ask questions  I did provide her with our surgical scheduler's card, with whom she can coordinate either a surgical date, or a follow-up appointment with me, by phone or in person as needed  Should she wish to proceed with surgery, the plan would be for image guided craniotomy for tumor, left frontoparietal   I also did offer referral for 2nd opinion should she wish       09/11/20 Metrics: [id number]=0 880; VAS 80; ECOG 0; KPS 90; MOCA 24/30        CHIEF COMPLAINT    Chief Complaint   Patient presents with    Consult    Brain Tumor       HISTORY    History of Present Illness     72y o  year old female     HPI    See Discussion    REVIEW OF SYSTEMS    Review of Systems   Constitutional: Negative  HENT: Negative  Eyes: Negative  Respiratory: Negative  Cardiovascular: Negative  Gastrointestinal: Negative  Endocrine: Negative  Genitourinary: Negative  Musculoskeletal: Positive for arthralgias (left shoulder) and gait problem (off balance at times due to vertigo)  Skin: Negative  Allergic/Immunologic: Negative      Neurological: Positive for dizziness (H/O vertigo), light-headedness and numbness (and tingling and warm sensation which starts in the foot and goes all the way up into the neck on the right side)  Negative for syncope (Last episode: 1983)  Hematological: Negative  Psychiatric/Behavioral: Positive for sleep disturbance (wakes up often and has hard time falling back asleep; 4-5x/night)  All other systems reviewed and are negative  Meds/Allergies     Current Outpatient Medications   Medication Sig Dispense Refill    levETIRAcetam (KEPPRA) 500 mg tablet 1/2 b i d  For the 1st week then 1 p o  B i d  60 tablet 5    meclizine (ANTIVERT) 25 mg tablet Take 25 mg by mouth daily at bedtime as needed for dizziness       No current facility-administered medications for this visit  Allergies   Allergen Reactions    Trilipix [Choline Fenofibrate] Other (See Comments)     Started in the upper/mid back and radiated into the front (chest area)       PAST HISTORY    Past Medical History:   Diagnosis Date    Hypercholesteremia     Pneumonia        Past Surgical History:   Procedure Laterality Date    NO PAST SURGERIES      TUBAL LIGATION         Social History     Tobacco Use    Smoking status: Never Smoker    Smokeless tobacco: Never Used   Substance Use Topics    Alcohol use: Not Currently    Drug use: Never       Family History   Problem Relation Age of Onset    No Known Problems Mother     Lung cancer Father     No Known Problems Sister          The following portions of the patient's history were reviewed in this encounter and updated as appropriate: Past medical, surgical, family, and social history, as well as medications, allergies, and review of systems  EXAM    Vitals:Blood pressure 125/71, pulse 67, temperature 98 2 °F (36 8 °C), temperature source Probe, resp  rate 16, height 5' 5" (1 651 m), weight 74 8 kg (165 lb)  ,Body mass index is 27 46 kg/m²  Physical Exam  Vitals signs reviewed  Constitutional:       Appearance: She is well-developed  HENT:      Head: Normocephalic  Eyes:      General: No scleral icterus  Neck:      Musculoskeletal: Neck supple  Cardiovascular:      Rate and Rhythm: Normal rate  Pulmonary:      Effort: Pulmonary effort is normal    Abdominal:      Palpations: Abdomen is soft  Skin:     General: Skin is warm and dry  Neurological:      Mental Status: She is alert and oriented to person, place, and time  GCS: GCS eye subscore is 4  GCS verbal subscore is 5  GCS motor subscore is 6  Coordination: Finger-Nose-Finger Test normal    Psychiatric:         Speech: Speech normal          Behavior: Behavior normal          Neurologic Exam     Mental Status   Oriented to person, place, and time  Attention: normal    Speech: speech is normal   Level of consciousness: alert    Cranial Nerves     CN VII   Facial expression full, symmetric  Motor Exam   Muscle bulk: normal  Overall muscle tone: normal  Right arm pronator drift: absent  Left arm pronator drift: absent  Moves all extremities, grossly normal     Gait, Coordination, and Reflexes     Coordination   Finger to nose coordination: normal    Tremor   Resting tremor: absent  Intention tremor: absent  Action tremor: absent  No aids  MEDICAL DECISION MAKING    Imaging Studies:     Mri Brain Seizure Wo And W Contrast    Result Date: 9/3/2020  Narrative: MRI  BRAIN  - WITH AND WITHOUT CONTRAST INDICATION: G40 111: 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, intractable, with status epilepticus  Seizure disorder  COMPARISON: None  TECHNIQUE:  Sagittal 3D SPGR, axial T2, axial FLAIR, axial T1, axial DWI, axial [place], coronal T2 and FLAIR  Post-contrast axial T1 , Coronal 3D SPGR  IV Contrast:  7 mL of gadobutrol injection (MULTI-DOSE) IMAGE QUALITY:   Diagnostic  FINDINGS: BRAIN PARENCHYMA: There is a 3 0 x 2 5 x 2 8 cm homogeneously enhancing mass in the parasagittal left posterior frontal/parietal region  Lesion abuts the dura and appears to be extra-axial and meningioma is favored    However there may be some parenchymal  involvement along the posterior and inferior margin of the mass  Atypical meningioma is a consideration  Mass extending exophytically from the brain is not excluded  There is surrounding vasogenic edema in the left frontal and parietal lobes  There is downward mass effect on the body of the left lateral ventricle  No shift or herniation  Small focus of susceptibility artifact within the lesion may represent calcification or blood products  No other mass or abnormal enhancement  No restricted diffusion  or acute infarct  Mild T2/flair hyperintensities in the periventricular and subcortical white matter likely related to chronic microangiopathy  Symmetric hippocampal formations with regard to size and signal  VENTRICLES:  Normal  SELLA AND PITUITARY GLAND:  Normal  ORBITS:  Normal  PARANASAL SINUSES:  Normal  VASCULATURE:  Evaluation of the major intracranial vasculature demonstrates appropriate flow voids  CALVARIUM AND SKULL BASE:  Normal  EXTRACRANIAL SOFT TISSUES:  Normal      Impression: Left parasagittal posterior frontal/parietal mass measuring 3 cm with vasogenic edema and mass effect  Recommend neurosurgical consultation  I personally discussed this study with Sabi Coon on 9/3/2020 at 11:24 AM  Workstation performed: [id number]       I have personally reviewed pertinent reports     and I have personally reviewed pertinent films in PACS

## 2020-09-15 ENCOUNTER — PREP FOR PROCEDURE (OUTPATIENT)
Dept: OTHER | Facility: HOSPITAL | Age: 70
End: 2020-09-15

## 2020-09-15 DIAGNOSIS — D32.9 MENINGIOMA (HCC): Primary | ICD-10-CM

## 2020-09-15 DIAGNOSIS — G93.6 CEREBRAL EDEMA (HCC): ICD-10-CM

## 2020-09-15 DIAGNOSIS — G93.5 BRAIN COMPRESSION (HCC): ICD-10-CM

## 2020-09-18 ENCOUNTER — TELEPHONE (OUTPATIENT)
Dept: FAMILY MEDICINE CLINIC | Facility: CLINIC | Age: 70
End: 2020-09-18

## 2020-09-18 NOTE — TELEPHONE ENCOUNTER
Bello Quintana form Neuro surgery called to schedule patient for pre op  Surgery is on 10/15/2020  Craniotomy resection of tumor  Dr Grey Yan is doing the surgery at [facility]  Phone number is [phone] fax is [phone]  They ordered labs, covid test and EKG  It will be general anesthesia

## 2020-09-21 ENCOUNTER — TELEPHONE (OUTPATIENT)
Dept: NEUROSURGERY | Facility: CLINIC | Age: 70
End: 2020-09-21

## 2020-09-21 ENCOUNTER — OFFICE VISIT (OUTPATIENT)
Dept: FAMILY MEDICINE CLINIC | Facility: CLINIC | Age: 70
End: 2020-09-21
Payer: COMMERCIAL

## 2020-09-21 VITALS
WEIGHT: 164.7 LBS | HEIGHT: 65 IN | OXYGEN SATURATION: 100 % | BODY MASS INDEX: 27.44 KG/M2 | HEART RATE: 59 BPM | DIASTOLIC BLOOD PRESSURE: 75 MMHG | RESPIRATION RATE: 14 BRPM | TEMPERATURE: 98.7 F | SYSTOLIC BLOOD PRESSURE: 120 MMHG

## 2020-09-21 DIAGNOSIS — D49.6 BRAIN TUMOR (HCC): Primary | ICD-10-CM

## 2020-09-21 DIAGNOSIS — M54.50 LOW BACK PAIN WITHOUT SCIATICA, UNSPECIFIED BACK PAIN LATERALITY, UNSPECIFIED CHRONICITY: ICD-10-CM

## 2020-09-21 PROCEDURE — 99214 OFFICE O/P EST MOD 30 MIN: CPT | Performed by: FAMILY MEDICINE

## 2020-09-21 NOTE — PROGRESS NOTES
Assessment/Plan:     Chronic Problems:  No problem-specific Assessment & Plan notes found for this encounter  Visit Diagnosis:  Diagnoses and all orders for this visit:    Brain tumor ([address] )    Low back pain without sciatica, unspecified back pain laterality, unspecified chronicity    Brain tumor  Discussed issues concerns with patient, inform family neuro surgical office did attempt to make contact  Advise to recall office in a m  Low back pain  Resolved essentially with OTC Tylenol, discussed potentials with family  Recommended call to neuro surgical office  Stressed the importance of proper hydration throughout the day      Subjective:    Patient ID: Bia Carrion is a 72 y o  female  Here for back ache   Started this afternoon   In regard to the back pain initial quite bad, negative injury trauma, occurred mid day  Did take a tylenol with approx 75 % relief   Was not able to contact the neurosurgeon ,   no response from that office  No changes in numbness or tingling to ext , neg fever chill, n/v/   Neg Sz , keppra presently at bid dosing   Neg loss of vison , neg bowel / bladder  Changes , neg fall   Admittedly does not drink water during the day, denies any dysuria, change in urine odor color         The following portions of the patient's history were reviewed and updated as appropriate: allergies, current medications, past family history, past medical history, past social history, past surgical history and problem list     Review of Systems   Constitutional: Negative for appetite change, chills, fever and unexpected weight change  HENT: Negative for congestion, dental problem, ear pain, hearing loss, postnasal drip, rhinorrhea, sinus pressure, sinus pain, sneezing, sore throat, tinnitus and voice change  Eyes: Negative for visual disturbance  Respiratory: Negative for apnea, cough, chest tightness and shortness of breath      Cardiovascular: Negative for chest pain, palpitations and leg swelling  Gastrointestinal: Negative for abdominal pain, blood in stool, constipation, diarrhea, nausea and vomiting  Endocrine: Negative for cold intolerance, heat intolerance, polydipsia, polyphagia and polyuria  Genitourinary: Negative for decreased urine volume, difficulty urinating, dysuria, frequency and hematuria  Musculoskeletal: Positive for back pain  Negative for arthralgias, gait problem, joint swelling and myalgias  Skin: Negative for color change, rash and wound  Allergic/Immunologic: Negative for environmental allergies and food allergies  Neurological: Negative for dizziness, syncope, weakness, light-headedness, numbness and headaches  Hematological: Negative for adenopathy  Does not bruise/bleed easily  Psychiatric/Behavioral: Negative for sleep disturbance and suicidal ideas  The patient is not nervous/anxious            /75 (BP Location: Left arm, Patient Position: Sitting, Cuff Size: Adult)   Pulse 59   Temp 98 7 °F (37 1 °C)   Resp 14   Ht 5' 5" (1 651 m)   Wt 74 7 kg (164 lb 11 2 oz)   SpO2 100%   BMI 27 41 kg/m²   Social History     Socioeconomic History    Marital status: /Civil Union     Spouse name: Not on file    Number of children: Not on file    Years of education: Not on file    Highest education level: Not on file   Occupational History    Not on file   Social Needs    Financial resource strain: Not on file    Food insecurity     Worry: Not on file     Inability: Not on file   [employer] needs     Medical: Not on file     Non-medical: Not on file   Tobacco Use    Smoking status: Never Smoker    Smokeless tobacco: Never Used   Substance and Sexual Activity    Alcohol use: Not Currently    Drug use: Never    Sexual activity: Not on file   Lifestyle    Physical activity     Days per week: Not on file     Minutes per session: Not on file    Stress: Not on file   Relationships    Social connections     Talks on phone: Not on file Gets together: Not on file     Attends [employer] service: Not on file     Active member of club or organization: Not on file     Attends meetings of clubs or organizations: Not on file     Relationship status: Not on file    Intimate partner violence     Fear of current or ex partner: Not on file     Emotionally abused: Not on file     Physically abused: Not on file     Forced sexual activity: Not on file   Other Topics Concern    Not on file   Social History Narrative    Not on file     Past Medical History:   Diagnosis Date    Hypercholesteremia     Pneumonia      Family History   Problem Relation Age of Onset    No Known Problems Mother     Lung cancer Father     No Known Problems Sister      Past Surgical History:   Procedure Laterality Date    NO PAST SURGERIES      TUBAL LIGATION         Current Outpatient Medications:     levETIRAcetam (KEPPRA) 500 mg tablet, 1/2 b i d  For the 1st week then 1 p o  B i d , Disp: 60 tablet, Rfl: 5    meclizine (ANTIVERT) 25 mg tablet, Take 25 mg by mouth daily at bedtime as needed for dizziness, Disp: , Rfl:     Allergies   Allergen Reactions    Trilipix [Choline Fenofibrate] Other (See Comments)     Started in the upper/mid back and radiated into the front (chest area)          Lab Review   not applicable     Imaging: Mri Brain Seizure Wo And W Contrast    Result Date: 9/3/2020  Narrative: MRI  BRAIN  - WITH AND WITHOUT CONTRAST INDICATION: G40 111: 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, intractable, with status epilepticus  Seizure disorder  COMPARISON: None  TECHNIQUE:  Sagittal 3D SPGR, axial T2, axial FLAIR, axial T1, axial DWI, axial [place], coronal T2 and FLAIR  Post-contrast axial T1 , Coronal 3D SPGR  IV Contrast:  7 mL of gadobutrol injection (MULTI-DOSE) IMAGE QUALITY:   Diagnostic   FINDINGS: BRAIN PARENCHYMA: There is a 3 0 x 2 5 x 2 8 cm homogeneously enhancing mass in the parasagittal left posterior frontal/parietal region  Lesion abuts the dura and appears to be extra-axial and meningioma is favored    However there may be some parenchymal  involvement along the posterior and inferior margin of the mass  Atypical meningioma is a consideration  Mass extending exophytically from the brain is not excluded  There is surrounding vasogenic edema in the left frontal and parietal lobes  There is downward mass effect on the body of the left lateral ventricle  No shift or herniation  Small focus of susceptibility artifact within the lesion may represent calcification or blood products  No other mass or abnormal enhancement  No restricted diffusion  or acute infarct  Mild T2/flair hyperintensities in the periventricular and subcortical white matter likely related to chronic microangiopathy  Symmetric hippocampal formations with regard to size and signal  VENTRICLES:  Normal  SELLA AND PITUITARY GLAND:  Normal  ORBITS:  Normal  PARANASAL SINUSES:  Normal  VASCULATURE:  Evaluation of the major intracranial vasculature demonstrates appropriate flow voids  CALVARIUM AND SKULL BASE:  Normal  EXTRACRANIAL SOFT TISSUES:  Normal      Impression: Left parasagittal posterior frontal/parietal mass measuring 3 cm with vasogenic edema and mass effect  Recommend neurosurgical consultation  I personally discussed this study with Anna Borrego on 9/3/2020 at 11:24 AM  Workstation performed: [id number]       Objective:     Physical Exam  Constitutional:       General: She is not in acute distress  Appearance: She is well-developed  She is not ill-appearing  HENT:      Head: Normocephalic and atraumatic  Eyes:      General: No scleral icterus  Conjunctiva/sclera: Conjunctivae normal    Neck:      Musculoskeletal: Normal range of motion and neck supple  Cardiovascular:      Rate and Rhythm: Normal rate and regular rhythm  Heart sounds: Normal heart sounds     Pulmonary:      Effort: Pulmonary effort is normal       Breath sounds: Normal breath sounds  Musculoskeletal: Normal range of motion  General: No tenderness  Right lower leg: No edema  Left lower leg: No edema  Skin:     General: Skin is warm and dry  Neurological:      Mental Status: She is alert and oriented to person, place, and time  Motor: No weakness  Coordination: Coordination normal       Gait: Gait normal       Deep Tendon Reflexes: Reflexes are normal and symmetric  Reflexes normal    Psychiatric:         Behavior: Behavior normal          Thought Content: Thought content normal          Judgment: Judgment normal            There are no Patient Instructions on file for this visit  BOBBY Antunez    Portions of the record may have been created with voice recognition software  Occasional wrong word or "sound a like" substitutions may have occurred due to the inherent limitations of voice recognition software  Read the chart carefully and recognize, using context, where substitutions have occurred

## 2020-09-21 NOTE — TELEPHONE ENCOUNTER
Received a call from [facility] requesting a call back in regards to her symptoms  States that since yesterday she has begun to have increased back and leg pain, and issues with ambulation  Attempted to call her back to discuss further  Left message to return call at her convenience

## 2020-09-29 ENCOUNTER — OFFICE VISIT (OUTPATIENT)
Dept: LAB | Facility: HOSPITAL | Age: 70
End: 2020-09-29
Attending: NEUROLOGICAL SURGERY
Payer: COMMERCIAL

## 2020-09-29 ENCOUNTER — TRANSCRIBE ORDERS (OUTPATIENT)
Dept: ADMINISTRATIVE | Facility: HOSPITAL | Age: 70
End: 2020-09-29

## 2020-09-29 ENCOUNTER — LAB REQUISITION (OUTPATIENT)
Dept: LAB | Facility: HOSPITAL | Age: 70
End: 2020-09-29
Payer: COMMERCIAL

## 2020-09-29 DIAGNOSIS — G93.5 BRAIN COMPRESSION (HCC): ICD-10-CM

## 2020-09-29 DIAGNOSIS — G93.6 CEREBRAL EDEMA (HCC): ICD-10-CM

## 2020-09-29 DIAGNOSIS — Z01.818 ENCOUNTER FOR OTHER PREPROCEDURAL EXAMINATION: ICD-10-CM

## 2020-09-29 DIAGNOSIS — G93.89 CEREBRAL MASS: ICD-10-CM

## 2020-09-29 DIAGNOSIS — D32.9 MENINGIOMA (HCC): ICD-10-CM

## 2020-09-29 DIAGNOSIS — Z01.818 PRE-OP TESTING: Primary | ICD-10-CM

## 2020-09-29 DIAGNOSIS — Z01.818 PRE-OP TESTING: ICD-10-CM

## 2020-09-29 DIAGNOSIS — D49.6 BRAIN TUMOR (HCC): ICD-10-CM

## 2020-09-29 LAB
ALBUMIN SERPL BCP-MCNC: 3.8 G/DL (ref 3.5–5)
ALP SERPL-CCNC: 76 U/L (ref 46–116)
ALT SERPL W P-5'-P-CCNC: 53 U/L (ref 12–78)
ANION GAP SERPL CALCULATED.3IONS-SCNC: 9 MMOL/L (ref 4–13)
APTT PPP: 33 SECONDS (ref 23–37)
AST SERPL W P-5'-P-CCNC: 27 U/L (ref 5–45)
ATRIAL RATE: 63 BPM
BASOPHILS # BLD AUTO: 0.04 THOUSANDS/ΜL (ref 0–0.1)
BASOPHILS NFR BLD AUTO: 1 % (ref 0–1)
BILIRUB SERPL-MCNC: 0.4 MG/DL (ref 0.2–1)
BUN SERPL-MCNC: 15 MG/DL (ref 5–25)
CALCIUM SERPL-MCNC: 9.3 MG/DL (ref 8.3–10.1)
CHLORIDE SERPL-SCNC: 106 MMOL/L (ref 100–108)
CO2 SERPL-SCNC: 26 MMOL/L (ref 21–32)
CREAT SERPL-MCNC: 0.84 MG/DL (ref 0.6–1.3)
EOSINOPHIL # BLD AUTO: 0.13 THOUSAND/ΜL (ref 0–0.61)
EOSINOPHIL NFR BLD AUTO: 2 % (ref 0–6)
ERYTHROCYTE [DISTWIDTH] IN BLOOD BY AUTOMATED COUNT: 12.3 % (ref 11.6–15.1)
EST. AVERAGE GLUCOSE BLD GHB EST-MCNC: 120 MG/DL
GFR SERPL CREATININE-BSD FRML MDRD: 71 ML/MIN/1.73SQ M
GLUCOSE P FAST SERPL-MCNC: 99 MG/DL (ref 65–99)
HBA1C MFR BLD: 5.8 %
HCT VFR BLD AUTO: 43.3 % (ref 34.8–46.1)
HGB BLD-MCNC: 13.9 G/DL (ref 11.5–15.4)
IMM GRANULOCYTES # BLD AUTO: 0.03 THOUSAND/UL (ref 0–0.2)
IMM GRANULOCYTES NFR BLD AUTO: 0 % (ref 0–2)
INR PPP: 0.99 (ref 0.84–1.19)
LYMPHOCYTES # BLD AUTO: 2.6 THOUSANDS/ΜL (ref 0.6–4.47)
LYMPHOCYTES NFR BLD AUTO: 32 % (ref 14–44)
MCH RBC QN AUTO: 29.2 PG (ref 26.8–34.3)
MCHC RBC AUTO-ENTMCNC: 32.1 G/DL (ref 31.4–37.4)
MCV RBC AUTO: 91 FL (ref 82–98)
MONOCYTES # BLD AUTO: 0.53 THOUSAND/ΜL (ref 0.17–1.22)
MONOCYTES NFR BLD AUTO: 7 % (ref 4–12)
NEUTROPHILS # BLD AUTO: 4.86 THOUSANDS/ΜL (ref 1.85–7.62)
NEUTS SEG NFR BLD AUTO: 58 % (ref 43–75)
NRBC BLD AUTO-RTO: 0 /100 WBCS
P AXIS: 49 DEGREES
PLATELET # BLD AUTO: 265 THOUSANDS/UL (ref 149–390)
PMV BLD AUTO: 12.8 FL (ref 8.9–12.7)
POTASSIUM SERPL-SCNC: 3.7 MMOL/L (ref 3.5–5.3)
PR INTERVAL: 154 MS
PROT SERPL-MCNC: 8.1 G/DL (ref 6.4–8.2)
PROTHROMBIN TIME: 13.3 SECONDS (ref 11.6–14.5)
QRS AXIS: -22 DEGREES
QRSD INTERVAL: 84 MS
QT INTERVAL: 440 MS
QTC INTERVAL: 450 MS
RBC # BLD AUTO: 4.76 MILLION/UL (ref 3.81–5.12)
SODIUM SERPL-SCNC: 141 MMOL/L (ref 136–145)
T WAVE AXIS: 12 DEGREES
VENTRICULAR RATE: 63 BPM
WBC # BLD AUTO: 8.19 THOUSAND/UL (ref 4.31–10.16)

## 2020-09-29 PROCEDURE — 86900 BLOOD TYPING SEROLOGIC ABO: CPT | Performed by: NEUROLOGICAL SURGERY

## 2020-09-29 PROCEDURE — 86850 RBC ANTIBODY SCREEN: CPT | Performed by: NEUROLOGICAL SURGERY

## 2020-09-29 PROCEDURE — 85610 PROTHROMBIN TIME: CPT

## 2020-09-29 PROCEDURE — 85730 THROMBOPLASTIN TIME PARTIAL: CPT

## 2020-09-29 PROCEDURE — 93005 ELECTROCARDIOGRAM TRACING: CPT

## 2020-09-29 PROCEDURE — 86901 BLOOD TYPING SEROLOGIC RH(D): CPT | Performed by: NEUROLOGICAL SURGERY

## 2020-09-29 PROCEDURE — 36415 COLL VENOUS BLD VENIPUNCTURE: CPT

## 2020-09-29 PROCEDURE — 80053 COMPREHEN METABOLIC PANEL: CPT

## 2020-09-29 PROCEDURE — 93010 ELECTROCARDIOGRAM REPORT: CPT | Performed by: INTERNAL MEDICINE

## 2020-09-29 PROCEDURE — 83036 HEMOGLOBIN GLYCOSYLATED A1C: CPT

## 2020-09-29 PROCEDURE — 85025 COMPLETE CBC W/AUTO DIFF WBC: CPT

## 2020-09-30 LAB
ABO GROUP BLD: NORMAL
BLD GP AB SCN SERPL QL: NEGATIVE
RH BLD: POSITIVE
SPECIMEN EXPIRATION DATE: NORMAL

## 2020-10-01 ENCOUNTER — OFFICE VISIT (OUTPATIENT)
Dept: FAMILY MEDICINE CLINIC | Facility: CLINIC | Age: 70
End: 2020-10-01
Payer: COMMERCIAL

## 2020-10-01 VITALS
WEIGHT: 165 LBS | SYSTOLIC BLOOD PRESSURE: 120 MMHG | RESPIRATION RATE: 14 BRPM | TEMPERATURE: 99.3 F | BODY MASS INDEX: 27.49 KG/M2 | OXYGEN SATURATION: 100 % | DIASTOLIC BLOOD PRESSURE: 70 MMHG | HEART RATE: 70 BPM | HEIGHT: 65 IN

## 2020-10-01 DIAGNOSIS — Z01.818 PRE-OP EXAMINATION: Primary | ICD-10-CM

## 2020-10-01 PROCEDURE — 99214 OFFICE O/P EST MOD 30 MIN: CPT | Performed by: FAMILY MEDICINE

## 2020-10-05 ENCOUNTER — OFFICE VISIT (OUTPATIENT)
Dept: NEUROSURGERY | Facility: CLINIC | Age: 70
End: 2020-10-05
Payer: COMMERCIAL

## 2020-10-05 VITALS
TEMPERATURE: 98.4 F | HEART RATE: 63 BPM | WEIGHT: 167.1 LBS | SYSTOLIC BLOOD PRESSURE: 125 MMHG | BODY MASS INDEX: 27.84 KG/M2 | RESPIRATION RATE: 16 BRPM | HEIGHT: 65 IN | DIASTOLIC BLOOD PRESSURE: 66 MMHG

## 2020-10-05 DIAGNOSIS — D32.9 MENINGIOMA (HCC): Primary | ICD-10-CM

## 2020-10-05 DIAGNOSIS — G93.5 BRAIN COMPRESSION (HCC): ICD-10-CM

## 2020-10-05 DIAGNOSIS — G93.6 CEREBRAL EDEMA (HCC): ICD-10-CM

## 2020-10-05 PROCEDURE — 1036F TOBACCO NON-USER: CPT | Performed by: NEUROLOGICAL SURGERY

## 2020-10-05 PROCEDURE — 99213 OFFICE O/P EST LOW 20 MIN: CPT | Performed by: NEUROLOGICAL SURGERY

## 2020-10-05 PROCEDURE — 1160F RVW MEDS BY RX/DR IN RCRD: CPT | Performed by: NEUROLOGICAL SURGERY

## 2020-10-05 RX ORDER — ACETAMINOPHEN 500 MG
500 TABLET ORAL AS NEEDED
COMMUNITY
End: 2022-05-12 | Stop reason: ALTCHOICE

## 2020-10-08 ENCOUNTER — LAB (OUTPATIENT)
Dept: LAB | Facility: HOSPITAL | Age: 70
End: 2020-10-08
Attending: NEUROLOGICAL SURGERY
Payer: COMMERCIAL

## 2020-10-08 DIAGNOSIS — G93.5 BRAIN COMPRESSION (HCC): ICD-10-CM

## 2020-10-08 DIAGNOSIS — D32.9 MENINGIOMA (HCC): ICD-10-CM

## 2020-10-08 DIAGNOSIS — G93.6 CEREBRAL EDEMA (HCC): ICD-10-CM

## 2020-10-08 DIAGNOSIS — G93.89 CEREBRAL MASS: ICD-10-CM

## 2020-10-08 DIAGNOSIS — D49.6 BRAIN TUMOR (HCC): ICD-10-CM

## 2020-10-08 LAB
BACTERIA UR QL AUTO: NORMAL /HPF
BILIRUB UR QL STRIP: NEGATIVE
CLARITY UR: CLEAR
COLOR UR: YELLOW
GLUCOSE UR STRIP-MCNC: NEGATIVE MG/DL
HGB UR QL STRIP.AUTO: ABNORMAL
KETONES UR STRIP-MCNC: NEGATIVE MG/DL
LEUKOCYTE ESTERASE UR QL STRIP: ABNORMAL
NITRITE UR QL STRIP: NEGATIVE
NON-SQ EPI CELLS URNS QL MICRO: NORMAL /HPF
PH UR STRIP.AUTO: 5 [PH]
PROT UR STRIP-MCNC: NEGATIVE MG/DL
RBC #/AREA URNS AUTO: NORMAL /HPF
SP GR UR STRIP.AUTO: 1.02 (ref 1–1.03)
UROBILINOGEN UR QL STRIP.AUTO: 0.2 E.U./DL
WBC #/AREA URNS AUTO: NORMAL /HPF

## 2020-10-08 PROCEDURE — 81001 URINALYSIS AUTO W/SCOPE: CPT | Performed by: NEUROLOGICAL SURGERY

## 2020-10-08 PROCEDURE — U0003 INFECTIOUS AGENT DETECTION BY NUCLEIC ACID (DNA OR RNA); SEVERE ACUTE RESPIRATORY SYNDROME CORONAVIRUS 2 (SARS-COV-2) (CORONAVIRUS DISEASE [COVID-19]), AMPLIFIED PROBE TECHNIQUE, MAKING USE OF HIGH THROUGHPUT TECHNOLOGIES AS DESCRIBED BY CMS-2020-01-R: HCPCS | Performed by: NEUROLOGICAL SURGERY

## 2020-10-09 LAB — SARS-COV-2 RNA SPEC QL NAA+PROBE: NOT DETECTED

## 2020-10-14 ENCOUNTER — ANESTHESIA EVENT (OUTPATIENT)
Dept: PERIOP | Facility: HOSPITAL | Age: 70
DRG: 025 | End: 2020-10-14
Payer: COMMERCIAL

## 2020-10-15 ENCOUNTER — DOCUMENTATION (OUTPATIENT)
Dept: NEUROSURGERY | Facility: CLINIC | Age: 70
End: 2020-10-15

## 2020-10-15 ENCOUNTER — APPOINTMENT (INPATIENT)
Dept: RADIOLOGY | Facility: HOSPITAL | Age: 70
DRG: 025 | End: 2020-10-15
Payer: COMMERCIAL

## 2020-10-15 ENCOUNTER — HOSPITAL ENCOUNTER (INPATIENT)
Facility: HOSPITAL | Age: 70
LOS: 8 days | DRG: 025 | End: 2020-10-23
Attending: NEUROLOGICAL SURGERY | Admitting: NEUROLOGICAL SURGERY
Payer: COMMERCIAL

## 2020-10-15 ENCOUNTER — ANESTHESIA (OUTPATIENT)
Dept: PERIOP | Facility: HOSPITAL | Age: 70
DRG: 025 | End: 2020-10-15
Payer: COMMERCIAL

## 2020-10-15 VITALS — HEART RATE: 87 BPM

## 2020-10-15 DIAGNOSIS — D49.6 BRAIN TUMOR (HCC): ICD-10-CM

## 2020-10-15 DIAGNOSIS — G93.5 BRAIN COMPRESSION (HCC): ICD-10-CM

## 2020-10-15 DIAGNOSIS — G40.109 SIMPLE PARTIAL SEIZURES (HCC): ICD-10-CM

## 2020-10-15 DIAGNOSIS — G93.6 CEREBRAL EDEMA (HCC): ICD-10-CM

## 2020-10-15 DIAGNOSIS — G83.9 PARESIS (HCC): ICD-10-CM

## 2020-10-15 DIAGNOSIS — D32.9 MENINGIOMA (HCC): Primary | ICD-10-CM

## 2020-10-15 LAB
ABO GROUP BLD: NORMAL
BASE EXCESS BLDA CALC-SCNC: -3 MMOL/L (ref -2–3)
CA-I BLD-SCNC: 1.2 MMOL/L (ref 1.12–1.32)
GLUCOSE SERPL-MCNC: 141 MG/DL (ref 65–140)
GLUCOSE SERPL-MCNC: 144 MG/DL (ref 65–140)
HCO3 BLDA-SCNC: 21.3 MMOL/L (ref 22–28)
HCT VFR BLD CALC: 35 % (ref 34.8–46.1)
HGB BLDA-MCNC: 11.9 G/DL (ref 11.5–15.4)
PCO2 BLD: 22 MMOL/L (ref 21–32)
PCO2 BLD: 35.6 MM HG (ref 36–44)
PH BLD: 7.38 [PH] (ref 7.35–7.45)
PO2 BLD: 146 MM HG (ref 75–129)
POTASSIUM BLD-SCNC: 3.3 MMOL/L (ref 3.5–5.3)
RH BLD: POSITIVE
SAO2 % BLD FROM PO2: 99 % (ref 60–85)
SODIUM BLD-SCNC: 142 MMOL/L (ref 136–145)
SPECIMEN SOURCE: ABNORMAL

## 2020-10-15 PROCEDURE — 86920 COMPATIBILITY TEST SPIN: CPT

## 2020-10-15 PROCEDURE — 70553 MRI BRAIN STEM W/O & W/DYE: CPT

## 2020-10-15 PROCEDURE — 82948 REAGENT STRIP/BLOOD GLUCOSE: CPT

## 2020-10-15 PROCEDURE — G1004 CDSM NDSC: HCPCS

## 2020-10-15 PROCEDURE — A9585 GADOBUTROL INJECTION: HCPCS | Performed by: NEUROLOGICAL SURGERY

## 2020-10-15 PROCEDURE — C1713 ANCHOR/SCREW BN/BN,TIS/BN: HCPCS | Performed by: NEUROLOGICAL SURGERY

## 2020-10-15 PROCEDURE — 84295 ASSAY OF SERUM SODIUM: CPT

## 2020-10-15 PROCEDURE — 69990 MICROSURGERY ADD-ON: CPT | Performed by: NEUROLOGICAL SURGERY

## 2020-10-15 PROCEDURE — 61781 SCAN PROC CRANIAL INTRA: CPT | Performed by: NEUROLOGICAL SURGERY

## 2020-10-15 PROCEDURE — 88331 PATH CONSLTJ SURG 1 BLK 1SPC: CPT | Performed by: PATHOLOGY

## 2020-10-15 PROCEDURE — 82330 ASSAY OF CALCIUM: CPT

## 2020-10-15 PROCEDURE — 61512 CRNEC TREPH EXC MNGIOMA STTL: CPT | Performed by: NEUROLOGICAL SURGERY

## 2020-10-15 PROCEDURE — 70450 CT HEAD/BRAIN W/O DYE: CPT

## 2020-10-15 PROCEDURE — 99223 1ST HOSP IP/OBS HIGH 75: CPT | Performed by: ANESTHESIOLOGY

## 2020-10-15 PROCEDURE — 82947 ASSAY GLUCOSE BLOOD QUANT: CPT

## 2020-10-15 PROCEDURE — 88342 IMHCHEM/IMCYTCHM 1ST ANTB: CPT

## 2020-10-15 PROCEDURE — C1781 MESH (IMPLANTABLE): HCPCS | Performed by: NEUROLOGICAL SURGERY

## 2020-10-15 PROCEDURE — 80048 BASIC METABOLIC PNL TOTAL CA: CPT | Performed by: NEUROLOGICAL SURGERY

## 2020-10-15 PROCEDURE — 88307 TISSUE EXAM BY PATHOLOGIST: CPT | Performed by: PATHOLOGY

## 2020-10-15 PROCEDURE — 88333 PATH CONSLTJ SURG CYTO XM 1: CPT | Performed by: PATHOLOGY

## 2020-10-15 PROCEDURE — 84132 ASSAY OF SERUM POTASSIUM: CPT

## 2020-10-15 PROCEDURE — 85014 HEMATOCRIT: CPT

## 2020-10-15 PROCEDURE — 00B00ZZ EXCISION OF BRAIN, OPEN APPROACH: ICD-10-PCS | Performed by: NEUROLOGICAL SURGERY

## 2020-10-15 PROCEDURE — 82803 BLOOD GASES ANY COMBINATION: CPT

## 2020-10-15 DEVICE — DURA 62106 SUBSTITUTE DUREPAIR 1X3IN NCE
Type: IMPLANTABLE DEVICE | Site: BRAIN | Status: FUNCTIONAL
Brand: DUREPAIR®

## 2020-10-15 DEVICE — IMPLANTABLE DEVICE
Type: IMPLANTABLE DEVICE | Site: CRANIAL | Status: FUNCTIONAL
Brand: THINFLAP SYSTEM

## 2020-10-15 RX ORDER — FUROSEMIDE 10 MG/ML
INJECTION INTRAMUSCULAR; INTRAVENOUS AS NEEDED
Status: DISCONTINUED | OUTPATIENT
Start: 2020-10-15 | End: 2020-10-15

## 2020-10-15 RX ORDER — BISACODYL 10 MG
10 SUPPOSITORY, RECTAL RECTAL DAILY PRN
Status: DISCONTINUED | OUTPATIENT
Start: 2020-10-15 | End: 2020-10-17

## 2020-10-15 RX ORDER — SODIUM CHLORIDE, SODIUM LACTATE, POTASSIUM CHLORIDE, CALCIUM CHLORIDE 600; 310; 30; 20 MG/100ML; MG/100ML; MG/100ML; MG/100ML
50 INJECTION, SOLUTION INTRAVENOUS CONTINUOUS
Status: DISCONTINUED | OUTPATIENT
Start: 2020-10-15 | End: 2020-10-15 | Stop reason: SDUPTHER

## 2020-10-15 RX ORDER — SODIUM CHLORIDE 9 MG/ML
100 INJECTION, SOLUTION INTRAVENOUS CONTINUOUS
Status: DISCONTINUED | OUTPATIENT
Start: 2020-10-15 | End: 2020-10-16

## 2020-10-15 RX ORDER — MANNITOL 250 MG/ML
INJECTION, SOLUTION INTRAVENOUS AS NEEDED
Status: DISCONTINUED | OUTPATIENT
Start: 2020-10-15 | End: 2020-10-15

## 2020-10-15 RX ORDER — HYDROMORPHONE HCL/PF 1 MG/ML
0.2 SYRINGE (ML) INJECTION
Status: DISCONTINUED | OUTPATIENT
Start: 2020-10-15 | End: 2020-10-15 | Stop reason: HOSPADM

## 2020-10-15 RX ORDER — MAGNESIUM HYDROXIDE 1200 MG/15ML
LIQUID ORAL AS NEEDED
Status: DISCONTINUED | OUTPATIENT
Start: 2020-10-15 | End: 2020-10-15 | Stop reason: HOSPADM

## 2020-10-15 RX ORDER — FENTANYL CITRATE/PF 50 MCG/ML
50 SYRINGE (ML) INJECTION
Status: DISCONTINUED | OUTPATIENT
Start: 2020-10-15 | End: 2020-10-15 | Stop reason: HOSPADM

## 2020-10-15 RX ORDER — DEXAMETHASONE SODIUM PHOSPHATE 10 MG/ML
4 INJECTION, SOLUTION INTRAMUSCULAR; INTRAVENOUS EVERY 8 HOURS
Status: DISCONTINUED | OUTPATIENT
Start: 2020-10-18 | End: 2020-10-16

## 2020-10-15 RX ORDER — ONDANSETRON 2 MG/ML
4 INJECTION INTRAMUSCULAR; INTRAVENOUS EVERY 6 HOURS PRN
Status: DISCONTINUED | OUTPATIENT
Start: 2020-10-15 | End: 2020-10-23 | Stop reason: HOSPADM

## 2020-10-15 RX ORDER — CEFAZOLIN SODIUM 1 G/50ML
1000 SOLUTION INTRAVENOUS EVERY 8 HOURS
Status: COMPLETED | OUTPATIENT
Start: 2020-10-15 | End: 2020-10-16

## 2020-10-15 RX ORDER — EPHEDRINE SULFATE 50 MG/ML
INJECTION INTRAVENOUS AS NEEDED
Status: DISCONTINUED | OUTPATIENT
Start: 2020-10-15 | End: 2020-10-15

## 2020-10-15 RX ORDER — DEXAMETHASONE SODIUM PHOSPHATE 10 MG/ML
4 INJECTION, SOLUTION INTRAMUSCULAR; INTRAVENOUS EVERY 6 HOURS SCHEDULED
Status: DISCONTINUED | OUTPATIENT
Start: 2020-10-15 | End: 2020-10-16

## 2020-10-15 RX ORDER — DEXAMETHASONE SODIUM PHOSPHATE 10 MG/ML
INJECTION, SOLUTION INTRAMUSCULAR; INTRAVENOUS AS NEEDED
Status: DISCONTINUED | OUTPATIENT
Start: 2020-10-15 | End: 2020-10-15

## 2020-10-15 RX ORDER — DEXAMETHASONE SODIUM PHOSPHATE 10 MG/ML
2 INJECTION, SOLUTION INTRAMUSCULAR; INTRAVENOUS EVERY 8 HOURS
Status: DISCONTINUED | OUTPATIENT
Start: 2020-10-24 | End: 2020-10-16

## 2020-10-15 RX ORDER — ONDANSETRON 2 MG/ML
4 INJECTION INTRAMUSCULAR; INTRAVENOUS ONCE AS NEEDED
Status: DISCONTINUED | OUTPATIENT
Start: 2020-10-15 | End: 2020-10-15 | Stop reason: HOSPADM

## 2020-10-15 RX ORDER — PROPOFOL 10 MG/ML
INJECTION, EMULSION INTRAVENOUS CONTINUOUS PRN
Status: DISCONTINUED | OUTPATIENT
Start: 2020-10-15 | End: 2020-10-15

## 2020-10-15 RX ORDER — PROPOFOL 10 MG/ML
INJECTION, EMULSION INTRAVENOUS AS NEEDED
Status: DISCONTINUED | OUTPATIENT
Start: 2020-10-15 | End: 2020-10-15

## 2020-10-15 RX ORDER — ONDANSETRON 2 MG/ML
INJECTION INTRAMUSCULAR; INTRAVENOUS AS NEEDED
Status: DISCONTINUED | OUTPATIENT
Start: 2020-10-15 | End: 2020-10-15

## 2020-10-15 RX ORDER — CHLORHEXIDINE GLUCONATE 0.12 MG/ML
15 RINSE ORAL ONCE
Status: COMPLETED | OUTPATIENT
Start: 2020-10-15 | End: 2020-10-15

## 2020-10-15 RX ORDER — DOCUSATE SODIUM 100 MG/1
100 CAPSULE, LIQUID FILLED ORAL 2 TIMES DAILY
Status: DISCONTINUED | OUTPATIENT
Start: 2020-10-15 | End: 2020-10-17

## 2020-10-15 RX ORDER — GLYCOPYRROLATE 0.2 MG/ML
INJECTION INTRAMUSCULAR; INTRAVENOUS AS NEEDED
Status: DISCONTINUED | OUTPATIENT
Start: 2020-10-15 | End: 2020-10-15

## 2020-10-15 RX ORDER — MECLIZINE HYDROCHLORIDE 25 MG/1
25 TABLET ORAL
Status: DISCONTINUED | OUTPATIENT
Start: 2020-10-15 | End: 2020-10-23 | Stop reason: HOSPADM

## 2020-10-15 RX ORDER — GINSENG 100 MG
CAPSULE ORAL AS NEEDED
Status: DISCONTINUED | OUTPATIENT
Start: 2020-10-15 | End: 2020-10-15 | Stop reason: HOSPADM

## 2020-10-15 RX ORDER — LIDOCAINE HYDROCHLORIDE 10 MG/ML
INJECTION, SOLUTION EPIDURAL; INFILTRATION; INTRACAUDAL; PERINEURAL AS NEEDED
Status: DISCONTINUED | OUTPATIENT
Start: 2020-10-15 | End: 2020-10-15

## 2020-10-15 RX ORDER — OXYCODONE HYDROCHLORIDE 5 MG/1
5 TABLET ORAL EVERY 4 HOURS PRN
Status: DISCONTINUED | OUTPATIENT
Start: 2020-10-15 | End: 2020-10-18

## 2020-10-15 RX ORDER — HYDROMORPHONE HCL/PF 1 MG/ML
0.5 SYRINGE (ML) INJECTION
Status: DISCONTINUED | OUTPATIENT
Start: 2020-10-15 | End: 2020-10-16

## 2020-10-15 RX ORDER — OXYCODONE HYDROCHLORIDE 10 MG/1
10 TABLET ORAL EVERY 4 HOURS PRN
Status: DISCONTINUED | OUTPATIENT
Start: 2020-10-15 | End: 2020-10-17

## 2020-10-15 RX ORDER — ROCURONIUM BROMIDE 10 MG/ML
INJECTION, SOLUTION INTRAVENOUS AS NEEDED
Status: DISCONTINUED | OUTPATIENT
Start: 2020-10-15 | End: 2020-10-15

## 2020-10-15 RX ORDER — DEXAMETHASONE SODIUM PHOSPHATE 10 MG/ML
2 INJECTION, SOLUTION INTRAMUSCULAR; INTRAVENOUS EVERY 12 HOURS SCHEDULED
Status: DISCONTINUED | OUTPATIENT
Start: 2020-10-27 | End: 2020-10-16

## 2020-10-15 RX ORDER — DEXAMETHASONE SODIUM PHOSPHATE 10 MG/ML
2 INJECTION, SOLUTION INTRAMUSCULAR; INTRAVENOUS EVERY 24 HOURS
Status: DISCONTINUED | OUTPATIENT
Start: 2020-10-30 | End: 2020-10-16

## 2020-10-15 RX ORDER — LIDOCAINE HYDROCHLORIDE AND EPINEPHRINE 10; 10 MG/ML; UG/ML
INJECTION, SOLUTION INFILTRATION; PERINEURAL AS NEEDED
Status: DISCONTINUED | OUTPATIENT
Start: 2020-10-15 | End: 2020-10-15 | Stop reason: HOSPADM

## 2020-10-15 RX ORDER — SODIUM CHLORIDE 9 MG/ML
INJECTION, SOLUTION INTRAVENOUS CONTINUOUS PRN
Status: DISCONTINUED | OUTPATIENT
Start: 2020-10-15 | End: 2020-10-15

## 2020-10-15 RX ORDER — CEFAZOLIN SODIUM 1 G/50ML
1000 SOLUTION INTRAVENOUS ONCE
Status: COMPLETED | OUTPATIENT
Start: 2020-10-15 | End: 2020-10-15

## 2020-10-15 RX ORDER — ACETAMINOPHEN 325 MG/1
975 TABLET ORAL EVERY 8 HOURS SCHEDULED
Status: DISCONTINUED | OUTPATIENT
Start: 2020-10-15 | End: 2020-10-23 | Stop reason: HOSPADM

## 2020-10-15 RX ORDER — NEOSTIGMINE METHYLSULFATE 1 MG/ML
INJECTION INTRAVENOUS AS NEEDED
Status: DISCONTINUED | OUTPATIENT
Start: 2020-10-15 | End: 2020-10-15

## 2020-10-15 RX ORDER — DEXAMETHASONE SODIUM PHOSPHATE 10 MG/ML
2 INJECTION, SOLUTION INTRAMUSCULAR; INTRAVENOUS EVERY 6 HOURS SCHEDULED
Status: DISCONTINUED | OUTPATIENT
Start: 2020-10-21 | End: 2020-10-16

## 2020-10-15 RX ORDER — FENTANYL CITRATE 50 UG/ML
INJECTION, SOLUTION INTRAMUSCULAR; INTRAVENOUS AS NEEDED
Status: DISCONTINUED | OUTPATIENT
Start: 2020-10-15 | End: 2020-10-15

## 2020-10-15 RX ORDER — SODIUM CHLORIDE, SODIUM LACTATE, POTASSIUM CHLORIDE, CALCIUM CHLORIDE 600; 310; 30; 20 MG/100ML; MG/100ML; MG/100ML; MG/100ML
100 INJECTION, SOLUTION INTRAVENOUS CONTINUOUS
Status: DISCONTINUED | OUTPATIENT
Start: 2020-10-15 | End: 2020-10-15

## 2020-10-15 RX ORDER — BUPIVACAINE HYDROCHLORIDE AND EPINEPHRINE 5; 5 MG/ML; UG/ML
INJECTION, SOLUTION EPIDURAL; INTRACAUDAL; PERINEURAL AS NEEDED
Status: DISCONTINUED | OUTPATIENT
Start: 2020-10-15 | End: 2020-10-15 | Stop reason: HOSPADM

## 2020-10-15 RX ADMIN — GADOBUTROL 7 ML: 604.72 INJECTION INTRAVENOUS at 21:53

## 2020-10-15 RX ADMIN — SODIUM CHLORIDE 1 MG/HR: 0.9 INJECTION, SOLUTION INTRAVENOUS at 08:54

## 2020-10-15 RX ADMIN — PHENYLEPHRINE HYDROCHLORIDE 100 MCG: 10 INJECTION INTRAVENOUS at 07:58

## 2020-10-15 RX ADMIN — SODIUM CHLORIDE, SODIUM LACTATE, POTASSIUM CHLORIDE, AND CALCIUM CHLORIDE: .6; .31; .03; .02 INJECTION, SOLUTION INTRAVENOUS at 11:51

## 2020-10-15 RX ADMIN — SODIUM CHLORIDE 100 ML/HR: 0.9 INJECTION, SOLUTION INTRAVENOUS at 13:42

## 2020-10-15 RX ADMIN — PROPOFOL 50 MG: 10 INJECTION, EMULSION INTRAVENOUS at 08:53

## 2020-10-15 RX ADMIN — ACETAMINOPHEN 975 MG: 325 TABLET, FILM COATED ORAL at 14:25

## 2020-10-15 RX ADMIN — ROCURONIUM BROMIDE 50 MG: 10 INJECTION, SOLUTION INTRAVENOUS at 07:43

## 2020-10-15 RX ADMIN — DEXAMETHASONE SODIUM PHOSPHATE 4 MG: 10 INJECTION, SOLUTION INTRAMUSCULAR; INTRAVENOUS at 14:34

## 2020-10-15 RX ADMIN — DEXAMETHASONE SODIUM PHOSPHATE 10 MG: 10 INJECTION, SOLUTION INTRAMUSCULAR; INTRAVENOUS at 07:44

## 2020-10-15 RX ADMIN — DEXAMETHASONE SODIUM PHOSPHATE 4 MG: 10 INJECTION, SOLUTION INTRAMUSCULAR; INTRAVENOUS at 23:33

## 2020-10-15 RX ADMIN — PROPOFOL 50 MG: 10 INJECTION, EMULSION INTRAVENOUS at 11:41

## 2020-10-15 RX ADMIN — FUROSEMIDE 20 MG: 10 INJECTION, SOLUTION INTRAMUSCULAR; INTRAVENOUS at 08:16

## 2020-10-15 RX ADMIN — CHLORHEXIDINE GLUCONATE 15 ML: 1.2 RINSE ORAL at 06:11

## 2020-10-15 RX ADMIN — LEVETIRACETAM 750 MG: 100 INJECTION, SOLUTION INTRAVENOUS at 17:39

## 2020-10-15 RX ADMIN — ACETAMINOPHEN 975 MG: 325 TABLET, FILM COATED ORAL at 22:21

## 2020-10-15 RX ADMIN — Medication 50 MCG: at 12:54

## 2020-10-15 RX ADMIN — SODIUM CHLORIDE: 0.9 INJECTION, SOLUTION INTRAVENOUS at 10:42

## 2020-10-15 RX ADMIN — LIDOCAINE HYDROCHLORIDE 50 MG: 10 INJECTION, SOLUTION EPIDURAL; INFILTRATION; INTRACAUDAL; PERINEURAL at 07:43

## 2020-10-15 RX ADMIN — MANNITOL 25 G: 12.5 INJECTION, SOLUTION INTRAVENOUS at 08:22

## 2020-10-15 RX ADMIN — CEFAZOLIN SODIUM 2000 MG: 1 SOLUTION INTRAVENOUS at 08:22

## 2020-10-15 RX ADMIN — PROPOFOL 120 MCG/KG/MIN: 10 INJECTION, EMULSION INTRAVENOUS at 07:43

## 2020-10-15 RX ADMIN — GLYCOPYRROLATE 0.4 MG: 0.2 INJECTION, SOLUTION INTRAMUSCULAR; INTRAVENOUS at 11:51

## 2020-10-15 RX ADMIN — PROPOFOL 150 MG: 10 INJECTION, EMULSION INTRAVENOUS at 07:43

## 2020-10-15 RX ADMIN — PHENYLEPHRINE HYDROCHLORIDE 100 MCG: 10 INJECTION INTRAVENOUS at 08:49

## 2020-10-15 RX ADMIN — REMIFENTANIL HYDROCHLORIDE 0.25 MCG/KG/MIN: 1 INJECTION, POWDER, LYOPHILIZED, FOR SOLUTION INTRAVENOUS at 07:43

## 2020-10-15 RX ADMIN — Medication 50 MCG: at 12:38

## 2020-10-15 RX ADMIN — PHENYLEPHRINE HYDROCHLORIDE 50 MCG/MIN: 10 INJECTION INTRAVENOUS at 08:02

## 2020-10-15 RX ADMIN — EPHEDRINE SULFATE 15 MG: 50 INJECTION, SOLUTION INTRAVENOUS at 08:03

## 2020-10-15 RX ADMIN — CEFAZOLIN SODIUM 1000 MG: 1 SOLUTION INTRAVENOUS at 23:34

## 2020-10-15 RX ADMIN — HYDROMORPHONE HYDROCHLORIDE 0.5 MG: 1 INJECTION, SOLUTION INTRAMUSCULAR; INTRAVENOUS; SUBCUTANEOUS at 16:15

## 2020-10-15 RX ADMIN — SODIUM CHLORIDE: 0.9 INJECTION, SOLUTION INTRAVENOUS at 07:47

## 2020-10-15 RX ADMIN — PROPOFOL 120 MCG/KG/MIN: 10 INJECTION, EMULSION INTRAVENOUS at 07:50

## 2020-10-15 RX ADMIN — ONDANSETRON 4 MG: 2 INJECTION INTRAMUSCULAR; INTRAVENOUS at 16:15

## 2020-10-15 RX ADMIN — PHENYLEPHRINE HYDROCHLORIDE 10 MCG/MIN: 10 INJECTION INTRAVENOUS at 13:20

## 2020-10-15 RX ADMIN — DOCUSATE SODIUM 100 MG: 100 CAPSULE ORAL at 17:42

## 2020-10-15 RX ADMIN — NEOSTIGMINE METHYLSULFATE 2 MG: 1 INJECTION, SOLUTION INTRAVENOUS at 11:51

## 2020-10-15 RX ADMIN — DEXAMETHASONE SODIUM PHOSPHATE 4 MG: 10 INJECTION, SOLUTION INTRAMUSCULAR; INTRAVENOUS at 17:42

## 2020-10-15 RX ADMIN — SODIUM CHLORIDE, SODIUM LACTATE, POTASSIUM CHLORIDE, AND CALCIUM CHLORIDE: .6; .31; .03; .02 INJECTION, SOLUTION INTRAVENOUS at 07:21

## 2020-10-15 RX ADMIN — OXYCODONE HYDROCHLORIDE 5 MG: 5 TABLET ORAL at 13:48

## 2020-10-15 RX ADMIN — EPHEDRINE SULFATE 10 MG: 50 INJECTION, SOLUTION INTRAVENOUS at 08:48

## 2020-10-15 RX ADMIN — HYDROMORPHONE HYDROCHLORIDE 0.5 MG: 1 INJECTION, SOLUTION INTRAMUSCULAR; INTRAVENOUS; SUBCUTANEOUS at 22:21

## 2020-10-15 RX ADMIN — CEFAZOLIN SODIUM 1000 MG: 1 SOLUTION INTRAVENOUS at 16:16

## 2020-10-15 RX ADMIN — ONDANSETRON 4 MG: 2 INJECTION INTRAMUSCULAR; INTRAVENOUS at 11:08

## 2020-10-15 RX ADMIN — REMIFENTANIL HYDROCHLORIDE 0.25 MCG/KG/MIN: 1 INJECTION, POWDER, LYOPHILIZED, FOR SOLUTION INTRAVENOUS at 07:50

## 2020-10-15 RX ADMIN — SODIUM CHLORIDE: 0.9 INJECTION, SOLUTION INTRAVENOUS at 09:52

## 2020-10-15 RX ADMIN — FENTANYL CITRATE 100 MCG: 50 INJECTION, SOLUTION INTRAMUSCULAR; INTRAVENOUS at 07:43

## 2020-10-16 LAB
ANION GAP SERPL CALCULATED.3IONS-SCNC: 2 MMOL/L (ref 4–13)
ANION GAP SERPL CALCULATED.3IONS-SCNC: 4 MMOL/L (ref 4–13)
ANION GAP SERPL CALCULATED.3IONS-SCNC: 5 MMOL/L (ref 4–13)
ANION GAP SERPL CALCULATED.3IONS-SCNC: 8 MMOL/L (ref 4–13)
APTT PPP: 29 SECONDS (ref 23–37)
BUN SERPL-MCNC: 10 MG/DL (ref 5–25)
BUN SERPL-MCNC: 10 MG/DL (ref 5–25)
BUN SERPL-MCNC: 11 MG/DL (ref 5–25)
BUN SERPL-MCNC: 9 MG/DL (ref 5–25)
CALCIUM SERPL-MCNC: 7.1 MG/DL (ref 8.3–10.1)
CALCIUM SERPL-MCNC: 8.7 MG/DL (ref 8.3–10.1)
CALCIUM SERPL-MCNC: 8.8 MG/DL (ref 8.3–10.1)
CALCIUM SERPL-MCNC: 8.9 MG/DL (ref 8.3–10.1)
CHLORIDE SERPL-SCNC: 107 MMOL/L (ref 100–108)
CHLORIDE SERPL-SCNC: 113 MMOL/L (ref 100–108)
CHLORIDE SERPL-SCNC: 113 MMOL/L (ref 100–108)
CHLORIDE SERPL-SCNC: 116 MMOL/L (ref 100–108)
CO2 SERPL-SCNC: 23 MMOL/L (ref 21–32)
CO2 SERPL-SCNC: 25 MMOL/L (ref 21–32)
CO2 SERPL-SCNC: 25 MMOL/L (ref 21–32)
CO2 SERPL-SCNC: 26 MMOL/L (ref 21–32)
CREAT SERPL-MCNC: 0.72 MG/DL (ref 0.6–1.3)
CREAT SERPL-MCNC: 0.82 MG/DL (ref 0.6–1.3)
CREAT SERPL-MCNC: 0.84 MG/DL (ref 0.6–1.3)
CREAT SERPL-MCNC: 1.43 MG/DL (ref 0.6–1.3)
ERYTHROCYTE [DISTWIDTH] IN BLOOD BY AUTOMATED COUNT: 12.6 % (ref 11.6–15.1)
GFR SERPL CREATININE-BSD FRML MDRD: 37 ML/MIN/1.73SQ M
GFR SERPL CREATININE-BSD FRML MDRD: 71 ML/MIN/1.73SQ M
GFR SERPL CREATININE-BSD FRML MDRD: 73 ML/MIN/1.73SQ M
GFR SERPL CREATININE-BSD FRML MDRD: 86 ML/MIN/1.73SQ M
GLUCOSE SERPL-MCNC: 136 MG/DL (ref 65–140)
GLUCOSE SERPL-MCNC: 141 MG/DL (ref 65–140)
GLUCOSE SERPL-MCNC: 165 MG/DL (ref 65–140)
GLUCOSE SERPL-MCNC: 192 MG/DL (ref 65–140)
GLUCOSE SERPL-MCNC: 302 MG/DL (ref 65–140)
HCT VFR BLD AUTO: 39.3 % (ref 34.8–46.1)
HGB BLD-MCNC: 12.6 G/DL (ref 11.5–15.4)
INR PPP: 1.01 (ref 0.84–1.19)
MCH RBC QN AUTO: 29.4 PG (ref 26.8–34.3)
MCHC RBC AUTO-ENTMCNC: 32.1 G/DL (ref 31.4–37.4)
MCV RBC AUTO: 92 FL (ref 82–98)
PLATELET # BLD AUTO: 338 THOUSANDS/UL (ref 149–390)
PMV BLD AUTO: 12.3 FL (ref 8.9–12.7)
POTASSIUM SERPL-SCNC: 3.5 MMOL/L (ref 3.5–5.3)
POTASSIUM SERPL-SCNC: 3.8 MMOL/L (ref 3.5–5.3)
POTASSIUM SERPL-SCNC: 3.9 MMOL/L (ref 3.5–5.3)
POTASSIUM SERPL-SCNC: 3.9 MMOL/L (ref 3.5–5.3)
PROTHROMBIN TIME: 13.3 SECONDS (ref 11.6–14.5)
RBC # BLD AUTO: 4.28 MILLION/UL (ref 3.81–5.12)
SODIUM SERPL-SCNC: 138 MMOL/L (ref 136–145)
SODIUM SERPL-SCNC: 142 MMOL/L (ref 136–145)
SODIUM SERPL-SCNC: 143 MMOL/L (ref 136–145)
SODIUM SERPL-SCNC: 144 MMOL/L (ref 136–145)
WBC # BLD AUTO: 21.39 THOUSAND/UL (ref 4.31–10.16)

## 2020-10-16 PROCEDURE — 97167 OT EVAL HIGH COMPLEX 60 MIN: CPT

## 2020-10-16 PROCEDURE — 85610 PROTHROMBIN TIME: CPT | Performed by: NEUROLOGICAL SURGERY

## 2020-10-16 PROCEDURE — 99233 SBSQ HOSP IP/OBS HIGH 50: CPT | Performed by: ANESTHESIOLOGY

## 2020-10-16 PROCEDURE — 80048 BASIC METABOLIC PNL TOTAL CA: CPT | Performed by: FAMILY MEDICINE

## 2020-10-16 PROCEDURE — 82948 REAGENT STRIP/BLOOD GLUCOSE: CPT

## 2020-10-16 PROCEDURE — 80048 BASIC METABOLIC PNL TOTAL CA: CPT | Performed by: STUDENT IN AN ORGANIZED HEALTH CARE EDUCATION/TRAINING PROGRAM

## 2020-10-16 PROCEDURE — 85730 THROMBOPLASTIN TIME PARTIAL: CPT | Performed by: NEUROLOGICAL SURGERY

## 2020-10-16 PROCEDURE — 97163 PT EVAL HIGH COMPLEX 45 MIN: CPT

## 2020-10-16 PROCEDURE — 85027 COMPLETE CBC AUTOMATED: CPT | Performed by: NEUROLOGICAL SURGERY

## 2020-10-16 PROCEDURE — 99024 POSTOP FOLLOW-UP VISIT: CPT | Performed by: PHYSICIAN ASSISTANT

## 2020-10-16 RX ORDER — SODIUM CHLORIDE 3 G/100ML
250 INJECTION, SOLUTION INTRAVENOUS ONCE
Status: COMPLETED | OUTPATIENT
Start: 2020-10-16 | End: 2020-10-16

## 2020-10-16 RX ORDER — HEPARIN SODIUM 5000 [USP'U]/ML
5000 INJECTION, SOLUTION INTRAVENOUS; SUBCUTANEOUS EVERY 8 HOURS SCHEDULED
Status: DISCONTINUED | OUTPATIENT
Start: 2020-10-16 | End: 2020-10-23 | Stop reason: HOSPADM

## 2020-10-16 RX ORDER — SODIUM CHLORIDE 9 MG/ML
50 INJECTION, SOLUTION INTRAVENOUS CONTINUOUS
Status: DISCONTINUED | OUTPATIENT
Start: 2020-10-16 | End: 2020-10-17

## 2020-10-16 RX ORDER — POTASSIUM CHLORIDE 20 MEQ/1
40 TABLET, EXTENDED RELEASE ORAL ONCE
Status: DISCONTINUED | OUTPATIENT
Start: 2020-10-16 | End: 2020-10-16

## 2020-10-16 RX ORDER — DEXAMETHASONE SODIUM PHOSPHATE 10 MG/ML
4 INJECTION, SOLUTION INTRAMUSCULAR; INTRAVENOUS EVERY 6 HOURS SCHEDULED
Status: DISCONTINUED | OUTPATIENT
Start: 2020-10-16 | End: 2020-10-18

## 2020-10-16 RX ADMIN — DOCUSATE SODIUM 100 MG: 100 CAPSULE ORAL at 09:57

## 2020-10-16 RX ADMIN — SODIUM CHLORIDE 50 ML/HR: 0.9 INJECTION, SOLUTION INTRAVENOUS at 22:39

## 2020-10-16 RX ADMIN — ONDANSETRON 4 MG: 2 INJECTION INTRAMUSCULAR; INTRAVENOUS at 00:29

## 2020-10-16 RX ADMIN — INSULIN LISPRO 2 UNITS: 100 INJECTION, SOLUTION INTRAVENOUS; SUBCUTANEOUS at 22:33

## 2020-10-16 RX ADMIN — LEVETIRACETAM 750 MG: 100 INJECTION, SOLUTION INTRAVENOUS at 18:21

## 2020-10-16 RX ADMIN — PHENYLEPHRINE HYDROCHLORIDE 50 MCG/MIN: 10 INJECTION INTRAVENOUS at 22:39

## 2020-10-16 RX ADMIN — ACETAMINOPHEN 975 MG: 325 TABLET, FILM COATED ORAL at 13:18

## 2020-10-16 RX ADMIN — OXYCODONE HYDROCHLORIDE 5 MG: 5 TABLET ORAL at 00:26

## 2020-10-16 RX ADMIN — SODIUM CHLORIDE 250 ML: 3 INJECTION, SOLUTION INTRAVENOUS at 17:10

## 2020-10-16 RX ADMIN — DOCUSATE SODIUM 100 MG: 100 CAPSULE ORAL at 18:20

## 2020-10-16 RX ADMIN — ACETAMINOPHEN 975 MG: 325 TABLET, FILM COATED ORAL at 05:24

## 2020-10-16 RX ADMIN — LEVETIRACETAM 750 MG: 100 INJECTION, SOLUTION INTRAVENOUS at 05:24

## 2020-10-16 RX ADMIN — DEXAMETHASONE SODIUM PHOSPHATE 4 MG: 10 INJECTION, SOLUTION INTRAMUSCULAR; INTRAVENOUS at 05:24

## 2020-10-16 RX ADMIN — OXYCODONE HYDROCHLORIDE 5 MG: 5 TABLET ORAL at 22:34

## 2020-10-16 RX ADMIN — HYDROMORPHONE HYDROCHLORIDE 0.5 MG: 1 INJECTION, SOLUTION INTRAMUSCULAR; INTRAVENOUS; SUBCUTANEOUS at 02:06

## 2020-10-16 RX ADMIN — DEXAMETHASONE SODIUM PHOSPHATE 4 MG: 10 INJECTION, SOLUTION INTRAMUSCULAR; INTRAVENOUS at 18:23

## 2020-10-16 RX ADMIN — HYDROMORPHONE HYDROCHLORIDE 0.5 MG: 1 INJECTION, SOLUTION INTRAMUSCULAR; INTRAVENOUS; SUBCUTANEOUS at 08:04

## 2020-10-16 RX ADMIN — INSULIN LISPRO 1 UNITS: 100 INJECTION, SOLUTION INTRAVENOUS; SUBCUTANEOUS at 12:55

## 2020-10-16 RX ADMIN — ONDANSETRON 4 MG: 2 INJECTION INTRAMUSCULAR; INTRAVENOUS at 07:58

## 2020-10-16 RX ADMIN — SODIUM CHLORIDE 100 ML/HR: 0.9 INJECTION, SOLUTION INTRAVENOUS at 02:06

## 2020-10-16 RX ADMIN — PHENYLEPHRINE HYDROCHLORIDE 60 MCG/MIN: 10 INJECTION INTRAVENOUS at 05:07

## 2020-10-16 RX ADMIN — ACETAMINOPHEN 975 MG: 325 TABLET, FILM COATED ORAL at 22:34

## 2020-10-16 RX ADMIN — HEPARIN SODIUM 5000 UNITS: 5000 INJECTION INTRAVENOUS; SUBCUTANEOUS at 14:35

## 2020-10-16 RX ADMIN — HEPARIN SODIUM 5000 UNITS: 5000 INJECTION INTRAVENOUS; SUBCUTANEOUS at 22:34

## 2020-10-16 RX ADMIN — DEXAMETHASONE SODIUM PHOSPHATE 4 MG: 10 INJECTION, SOLUTION INTRAMUSCULAR; INTRAVENOUS at 12:54

## 2020-10-16 RX ADMIN — SODIUM CHLORIDE 125 ML/HR: 0.9 INJECTION, SOLUTION INTRAVENOUS at 07:32

## 2020-10-17 LAB
ABO GROUP BLD BPU: NORMAL
ABO GROUP BLD BPU: NORMAL
ANION GAP SERPL CALCULATED.3IONS-SCNC: 4 MMOL/L (ref 4–13)
BASOPHILS # BLD AUTO: 0.02 THOUSANDS/ΜL (ref 0–0.1)
BASOPHILS NFR BLD AUTO: 0 % (ref 0–1)
BPU ID: NORMAL
BPU ID: NORMAL
BUN SERPL-MCNC: 10 MG/DL (ref 5–25)
CALCIUM SERPL-MCNC: 8.5 MG/DL (ref 8.3–10.1)
CHLORIDE SERPL-SCNC: 114 MMOL/L (ref 100–108)
CO2 SERPL-SCNC: 25 MMOL/L (ref 21–32)
CREAT SERPL-MCNC: 0.76 MG/DL (ref 0.6–1.3)
CROSSMATCH: NORMAL
CROSSMATCH: NORMAL
EOSINOPHIL # BLD AUTO: 0 THOUSAND/ΜL (ref 0–0.61)
EOSINOPHIL NFR BLD AUTO: 0 % (ref 0–6)
ERYTHROCYTE [DISTWIDTH] IN BLOOD BY AUTOMATED COUNT: 12.8 % (ref 11.6–15.1)
GFR SERPL CREATININE-BSD FRML MDRD: 80 ML/MIN/1.73SQ M
GLUCOSE SERPL-MCNC: 147 MG/DL (ref 65–140)
GLUCOSE SERPL-MCNC: 153 MG/DL (ref 65–140)
GLUCOSE SERPL-MCNC: 155 MG/DL (ref 65–140)
GLUCOSE SERPL-MCNC: 163 MG/DL (ref 65–140)
HCT VFR BLD AUTO: 37.2 % (ref 34.8–46.1)
HGB BLD-MCNC: 11.8 G/DL (ref 11.5–15.4)
IMM GRANULOCYTES # BLD AUTO: 0.17 THOUSAND/UL (ref 0–0.2)
IMM GRANULOCYTES NFR BLD AUTO: 1 % (ref 0–2)
LYMPHOCYTES # BLD AUTO: 1.2 THOUSANDS/ΜL (ref 0.6–4.47)
LYMPHOCYTES NFR BLD AUTO: 6 % (ref 14–44)
MCH RBC QN AUTO: 29.4 PG (ref 26.8–34.3)
MCHC RBC AUTO-ENTMCNC: 31.7 G/DL (ref 31.4–37.4)
MCV RBC AUTO: 93 FL (ref 82–98)
MONOCYTES # BLD AUTO: 0.9 THOUSAND/ΜL (ref 0.17–1.22)
MONOCYTES NFR BLD AUTO: 5 % (ref 4–12)
NEUTROPHILS # BLD AUTO: 16.98 THOUSANDS/ΜL (ref 1.85–7.62)
NEUTS SEG NFR BLD AUTO: 88 % (ref 43–75)
NRBC BLD AUTO-RTO: 0 /100 WBCS
PLATELET # BLD AUTO: 292 THOUSANDS/UL (ref 149–390)
PMV BLD AUTO: 12.8 FL (ref 8.9–12.7)
POTASSIUM SERPL-SCNC: 4 MMOL/L (ref 3.5–5.3)
RBC # BLD AUTO: 4.01 MILLION/UL (ref 3.81–5.12)
SODIUM SERPL-SCNC: 143 MMOL/L (ref 136–145)
UNIT DISPENSE STATUS: NORMAL
UNIT DISPENSE STATUS: NORMAL
UNIT PRODUCT CODE: NORMAL
UNIT PRODUCT CODE: NORMAL
UNIT RH: NORMAL
UNIT RH: NORMAL
WBC # BLD AUTO: 19.27 THOUSAND/UL (ref 4.31–10.16)

## 2020-10-17 PROCEDURE — 85025 COMPLETE CBC W/AUTO DIFF WBC: CPT | Performed by: STUDENT IN AN ORGANIZED HEALTH CARE EDUCATION/TRAINING PROGRAM

## 2020-10-17 PROCEDURE — 99024 POSTOP FOLLOW-UP VISIT: CPT | Performed by: NEUROLOGICAL SURGERY

## 2020-10-17 PROCEDURE — 99233 SBSQ HOSP IP/OBS HIGH 50: CPT | Performed by: ANESTHESIOLOGY

## 2020-10-17 PROCEDURE — 82948 REAGENT STRIP/BLOOD GLUCOSE: CPT

## 2020-10-17 PROCEDURE — 80048 BASIC METABOLIC PNL TOTAL CA: CPT | Performed by: STUDENT IN AN ORGANIZED HEALTH CARE EDUCATION/TRAINING PROGRAM

## 2020-10-17 RX ORDER — POLYETHYLENE GLYCOL 3350 17 G/17G
17 POWDER, FOR SOLUTION ORAL DAILY PRN
Status: DISCONTINUED | OUTPATIENT
Start: 2020-10-17 | End: 2020-10-23 | Stop reason: HOSPADM

## 2020-10-17 RX ORDER — MIDODRINE HYDROCHLORIDE 5 MG/1
5 TABLET ORAL
Status: DISCONTINUED | OUTPATIENT
Start: 2020-10-17 | End: 2020-10-23 | Stop reason: HOSPADM

## 2020-10-17 RX ORDER — AMOXICILLIN 250 MG
2 CAPSULE ORAL
Status: DISCONTINUED | OUTPATIENT
Start: 2020-10-17 | End: 2020-10-23 | Stop reason: HOSPADM

## 2020-10-17 RX ORDER — BISACODYL 10 MG
10 SUPPOSITORY, RECTAL RECTAL DAILY PRN
Status: DISCONTINUED | OUTPATIENT
Start: 2020-10-17 | End: 2020-10-23 | Stop reason: HOSPADM

## 2020-10-17 RX ADMIN — ACETAMINOPHEN 975 MG: 325 TABLET, FILM COATED ORAL at 06:03

## 2020-10-17 RX ADMIN — LEVETIRACETAM 750 MG: 100 INJECTION, SOLUTION INTRAVENOUS at 05:58

## 2020-10-17 RX ADMIN — DEXAMETHASONE SODIUM PHOSPHATE 4 MG: 10 INJECTION, SOLUTION INTRAMUSCULAR; INTRAVENOUS at 23:40

## 2020-10-17 RX ADMIN — INSULIN LISPRO 1 UNITS: 100 INJECTION, SOLUTION INTRAVENOUS; SUBCUTANEOUS at 12:51

## 2020-10-17 RX ADMIN — ACETAMINOPHEN 975 MG: 325 TABLET, FILM COATED ORAL at 13:31

## 2020-10-17 RX ADMIN — ACETAMINOPHEN 975 MG: 325 TABLET, FILM COATED ORAL at 21:50

## 2020-10-17 RX ADMIN — HEPARIN SODIUM 5000 UNITS: 5000 INJECTION INTRAVENOUS; SUBCUTANEOUS at 13:31

## 2020-10-17 RX ADMIN — DEXAMETHASONE SODIUM PHOSPHATE 4 MG: 10 INJECTION, SOLUTION INTRAMUSCULAR; INTRAVENOUS at 18:15

## 2020-10-17 RX ADMIN — DEXAMETHASONE SODIUM PHOSPHATE 4 MG: 10 INJECTION, SOLUTION INTRAMUSCULAR; INTRAVENOUS at 12:51

## 2020-10-17 RX ADMIN — MIDODRINE HYDROCHLORIDE 5 MG: 5 TABLET ORAL at 18:10

## 2020-10-17 RX ADMIN — MIDODRINE HYDROCHLORIDE 5 MG: 5 TABLET ORAL at 13:31

## 2020-10-17 RX ADMIN — HEPARIN SODIUM 5000 UNITS: 5000 INJECTION INTRAVENOUS; SUBCUTANEOUS at 06:03

## 2020-10-17 RX ADMIN — DEXAMETHASONE SODIUM PHOSPHATE 4 MG: 10 INJECTION, SOLUTION INTRAMUSCULAR; INTRAVENOUS at 00:24

## 2020-10-17 RX ADMIN — DOCUSATE SODIUM AND SENNOSIDES 2 TABLET: 8.6; 5 TABLET ORAL at 21:50

## 2020-10-17 RX ADMIN — LEVETIRACETAM 750 MG: 100 INJECTION, SOLUTION INTRAVENOUS at 18:10

## 2020-10-17 RX ADMIN — POLYETHYLENE GLYCOL 3350 17 G: 17 POWDER, FOR SOLUTION ORAL at 13:34

## 2020-10-17 RX ADMIN — HEPARIN SODIUM 5000 UNITS: 5000 INJECTION INTRAVENOUS; SUBCUTANEOUS at 21:50

## 2020-10-17 RX ADMIN — DEXAMETHASONE SODIUM PHOSPHATE 4 MG: 10 INJECTION, SOLUTION INTRAMUSCULAR; INTRAVENOUS at 05:58

## 2020-10-18 ENCOUNTER — APPOINTMENT (INPATIENT)
Dept: RADIOLOGY | Facility: HOSPITAL | Age: 70
DRG: 025 | End: 2020-10-18
Payer: COMMERCIAL

## 2020-10-18 LAB
ANION GAP SERPL CALCULATED.3IONS-SCNC: 3 MMOL/L (ref 4–13)
BASOPHILS # BLD AUTO: 0.01 THOUSANDS/ΜL (ref 0–0.1)
BASOPHILS NFR BLD AUTO: 0 % (ref 0–1)
BUN SERPL-MCNC: 16 MG/DL (ref 5–25)
CALCIUM SERPL-MCNC: 8.5 MG/DL (ref 8.3–10.1)
CHLORIDE SERPL-SCNC: 110 MMOL/L (ref 100–108)
CO2 SERPL-SCNC: 29 MMOL/L (ref 21–32)
CREAT SERPL-MCNC: 0.65 MG/DL (ref 0.6–1.3)
EOSINOPHIL # BLD AUTO: 0 THOUSAND/ΜL (ref 0–0.61)
EOSINOPHIL NFR BLD AUTO: 0 % (ref 0–6)
ERYTHROCYTE [DISTWIDTH] IN BLOOD BY AUTOMATED COUNT: 12.5 % (ref 11.6–15.1)
GFR SERPL CREATININE-BSD FRML MDRD: 91 ML/MIN/1.73SQ M
GLUCOSE SERPL-MCNC: 138 MG/DL (ref 65–140)
GLUCOSE SERPL-MCNC: 143 MG/DL (ref 65–140)
GLUCOSE SERPL-MCNC: 165 MG/DL (ref 65–140)
GLUCOSE SERPL-MCNC: 168 MG/DL (ref 65–140)
GLUCOSE SERPL-MCNC: 175 MG/DL (ref 65–140)
HCT VFR BLD AUTO: 36.6 % (ref 34.8–46.1)
HGB BLD-MCNC: 11.9 G/DL (ref 11.5–15.4)
IMM GRANULOCYTES # BLD AUTO: 0.12 THOUSAND/UL (ref 0–0.2)
IMM GRANULOCYTES NFR BLD AUTO: 1 % (ref 0–2)
LYMPHOCYTES # BLD AUTO: 1.44 THOUSANDS/ΜL (ref 0.6–4.47)
LYMPHOCYTES NFR BLD AUTO: 12 % (ref 14–44)
MAGNESIUM SERPL-MCNC: 2.3 MG/DL (ref 1.6–2.6)
MCH RBC QN AUTO: 29.8 PG (ref 26.8–34.3)
MCHC RBC AUTO-ENTMCNC: 32.5 G/DL (ref 31.4–37.4)
MCV RBC AUTO: 92 FL (ref 82–98)
MONOCYTES # BLD AUTO: 0.68 THOUSAND/ΜL (ref 0.17–1.22)
MONOCYTES NFR BLD AUTO: 5 % (ref 4–12)
NEUTROPHILS # BLD AUTO: 10.25 THOUSANDS/ΜL (ref 1.85–7.62)
NEUTS SEG NFR BLD AUTO: 82 % (ref 43–75)
NRBC BLD AUTO-RTO: 0 /100 WBCS
PHOSPHATE SERPL-MCNC: 2.2 MG/DL (ref 2.3–4.1)
PLATELET # BLD AUTO: 229 THOUSANDS/UL (ref 149–390)
PMV BLD AUTO: 12.5 FL (ref 8.9–12.7)
POTASSIUM SERPL-SCNC: 4.1 MMOL/L (ref 3.5–5.3)
RBC # BLD AUTO: 4 MILLION/UL (ref 3.81–5.12)
SODIUM SERPL-SCNC: 142 MMOL/L (ref 136–145)
WBC # BLD AUTO: 12.5 THOUSAND/UL (ref 4.31–10.16)

## 2020-10-18 PROCEDURE — 99233 SBSQ HOSP IP/OBS HIGH 50: CPT | Performed by: ANESTHESIOLOGY

## 2020-10-18 PROCEDURE — 99024 POSTOP FOLLOW-UP VISIT: CPT | Performed by: NEUROLOGICAL SURGERY

## 2020-10-18 PROCEDURE — 83735 ASSAY OF MAGNESIUM: CPT | Performed by: PHYSICIAN ASSISTANT

## 2020-10-18 PROCEDURE — 82948 REAGENT STRIP/BLOOD GLUCOSE: CPT

## 2020-10-18 PROCEDURE — 74018 RADEX ABDOMEN 1 VIEW: CPT

## 2020-10-18 PROCEDURE — 80048 BASIC METABOLIC PNL TOTAL CA: CPT | Performed by: PHYSICIAN ASSISTANT

## 2020-10-18 PROCEDURE — 84100 ASSAY OF PHOSPHORUS: CPT | Performed by: PHYSICIAN ASSISTANT

## 2020-10-18 PROCEDURE — 85025 COMPLETE CBC W/AUTO DIFF WBC: CPT | Performed by: PHYSICIAN ASSISTANT

## 2020-10-18 RX ORDER — DEXAMETHASONE 2 MG/1
2 TABLET ORAL DAILY
Status: DISCONTINUED | OUTPATIENT
Start: 2020-10-27 | End: 2020-10-23 | Stop reason: HOSPADM

## 2020-10-18 RX ORDER — DEXAMETHASONE 4 MG/1
4 TABLET ORAL EVERY 8 HOURS SCHEDULED
Status: COMPLETED | OUTPATIENT
Start: 2020-10-19 | End: 2020-10-20

## 2020-10-18 RX ORDER — DEXAMETHASONE 2 MG/1
2 TABLET ORAL EVERY 8 HOURS SCHEDULED
Status: DISCONTINUED | OUTPATIENT
Start: 2020-10-23 | End: 2020-10-23 | Stop reason: HOSPADM

## 2020-10-18 RX ORDER — DEXAMETHASONE 2 MG/1
2 TABLET ORAL EVERY 12 HOURS SCHEDULED
Status: DISCONTINUED | OUTPATIENT
Start: 2020-10-24 | End: 2020-10-23 | Stop reason: HOSPADM

## 2020-10-18 RX ORDER — DEXAMETHASONE 2 MG/1
2 TABLET ORAL EVERY 6 HOURS SCHEDULED
Status: COMPLETED | OUTPATIENT
Start: 2020-10-20 | End: 2020-10-22

## 2020-10-18 RX ORDER — ATROPINE SULFATE 0.1 MG/ML
INJECTION INTRAVENOUS
Status: DISPENSED
Start: 2020-10-18 | End: 2020-10-18

## 2020-10-18 RX ORDER — DEXAMETHASONE 4 MG/1
4 TABLET ORAL EVERY 6 HOURS SCHEDULED
Status: DISPENSED | OUTPATIENT
Start: 2020-10-18 | End: 2020-10-18

## 2020-10-18 RX ADMIN — DEXAMETHASONE SODIUM PHOSPHATE 4 MG: 10 INJECTION, SOLUTION INTRAMUSCULAR; INTRAVENOUS at 13:00

## 2020-10-18 RX ADMIN — HEPARIN SODIUM 5000 UNITS: 5000 INJECTION INTRAVENOUS; SUBCUTANEOUS at 21:44

## 2020-10-18 RX ADMIN — ONDANSETRON 4 MG: 2 INJECTION INTRAMUSCULAR; INTRAVENOUS at 07:32

## 2020-10-18 RX ADMIN — ACETAMINOPHEN 975 MG: 325 TABLET, FILM COATED ORAL at 06:11

## 2020-10-18 RX ADMIN — BISACODYL 10 MG: 10 SUPPOSITORY RECTAL at 10:37

## 2020-10-18 RX ADMIN — INSULIN LISPRO 1 UNITS: 100 INJECTION, SOLUTION INTRAVENOUS; SUBCUTANEOUS at 06:36

## 2020-10-18 RX ADMIN — MIDODRINE HYDROCHLORIDE 5 MG: 5 TABLET ORAL at 21:43

## 2020-10-18 RX ADMIN — DEXAMETHASONE 4 MG: 4 TABLET ORAL at 23:12

## 2020-10-18 RX ADMIN — INSULIN LISPRO 1 UNITS: 100 INJECTION, SOLUTION INTRAVENOUS; SUBCUTANEOUS at 17:55

## 2020-10-18 RX ADMIN — HEPARIN SODIUM 5000 UNITS: 5000 INJECTION INTRAVENOUS; SUBCUTANEOUS at 06:11

## 2020-10-18 RX ADMIN — MIDODRINE HYDROCHLORIDE 5 MG: 5 TABLET ORAL at 06:11

## 2020-10-18 RX ADMIN — ACETAMINOPHEN 975 MG: 325 TABLET, FILM COATED ORAL at 14:45

## 2020-10-18 RX ADMIN — ACETAMINOPHEN 975 MG: 325 TABLET, FILM COATED ORAL at 21:44

## 2020-10-18 RX ADMIN — DEXAMETHASONE 4 MG: 4 TABLET ORAL at 17:54

## 2020-10-18 RX ADMIN — HEPARIN SODIUM 5000 UNITS: 5000 INJECTION INTRAVENOUS; SUBCUTANEOUS at 14:45

## 2020-10-18 RX ADMIN — LEVETIRACETAM 750 MG: 100 INJECTION, SOLUTION INTRAVENOUS at 17:54

## 2020-10-18 RX ADMIN — DEXAMETHASONE SODIUM PHOSPHATE 4 MG: 10 INJECTION, SOLUTION INTRAMUSCULAR; INTRAVENOUS at 06:10

## 2020-10-18 RX ADMIN — LEVETIRACETAM 750 MG: 100 INJECTION, SOLUTION INTRAVENOUS at 06:09

## 2020-10-19 LAB
ANION GAP SERPL CALCULATED.3IONS-SCNC: 5 MMOL/L (ref 4–13)
BASOPHILS # BLD MANUAL: 0 THOUSAND/UL (ref 0–0.1)
BASOPHILS NFR MAR MANUAL: 0 % (ref 0–1)
BUN SERPL-MCNC: 23 MG/DL (ref 5–25)
CALCIUM SERPL-MCNC: 9.1 MG/DL (ref 8.3–10.1)
CHLORIDE SERPL-SCNC: 109 MMOL/L (ref 100–108)
CO2 SERPL-SCNC: 27 MMOL/L (ref 21–32)
CREAT SERPL-MCNC: 0.72 MG/DL (ref 0.6–1.3)
EOSINOPHIL # BLD MANUAL: 0 THOUSAND/UL (ref 0–0.4)
EOSINOPHIL NFR BLD MANUAL: 0 % (ref 0–6)
ERYTHROCYTE [DISTWIDTH] IN BLOOD BY AUTOMATED COUNT: 12.3 % (ref 11.6–15.1)
GFR SERPL CREATININE-BSD FRML MDRD: 86 ML/MIN/1.73SQ M
GLUCOSE SERPL-MCNC: 153 MG/DL (ref 65–140)
GLUCOSE SERPL-MCNC: 161 MG/DL (ref 65–140)
GLUCOSE SERPL-MCNC: 163 MG/DL (ref 65–140)
GLUCOSE SERPL-MCNC: 165 MG/DL (ref 65–140)
GLUCOSE SERPL-MCNC: 173 MG/DL (ref 65–140)
HCT VFR BLD AUTO: 36.9 % (ref 34.8–46.1)
HGB BLD-MCNC: 12 G/DL (ref 11.5–15.4)
LYMPHOCYTES # BLD AUTO: 0.48 THOUSAND/UL (ref 0.6–4.47)
LYMPHOCYTES # BLD AUTO: 4 % (ref 14–44)
MCH RBC QN AUTO: 29.6 PG (ref 26.8–34.3)
MCHC RBC AUTO-ENTMCNC: 32.5 G/DL (ref 31.4–37.4)
MCV RBC AUTO: 91 FL (ref 82–98)
MONOCYTES # BLD AUTO: 0.24 THOUSAND/UL (ref 0–1.22)
MONOCYTES NFR BLD: 2 % (ref 4–12)
NEUTROPHILS # BLD MANUAL: 11.19 THOUSAND/UL (ref 1.85–7.62)
NEUTS SEG NFR BLD AUTO: 94 % (ref 43–75)
NRBC BLD AUTO-RTO: 0 /100 WBCS
PLATELET # BLD AUTO: 241 THOUSANDS/UL (ref 149–390)
PLATELET BLD QL SMEAR: ADEQUATE
PMV BLD AUTO: 14.5 FL (ref 8.9–12.7)
POTASSIUM SERPL-SCNC: 4.1 MMOL/L (ref 3.5–5.3)
RBC # BLD AUTO: 4.05 MILLION/UL (ref 3.81–5.12)
SODIUM SERPL-SCNC: 141 MMOL/L (ref 136–145)
WBC # BLD AUTO: 11.9 THOUSAND/UL (ref 4.31–10.16)

## 2020-10-19 PROCEDURE — 97535 SELF CARE MNGMENT TRAINING: CPT

## 2020-10-19 PROCEDURE — 85027 COMPLETE CBC AUTOMATED: CPT | Performed by: PHYSICIAN ASSISTANT

## 2020-10-19 PROCEDURE — 97530 THERAPEUTIC ACTIVITIES: CPT

## 2020-10-19 PROCEDURE — 99223 1ST HOSP IP/OBS HIGH 75: CPT

## 2020-10-19 PROCEDURE — 80048 BASIC METABOLIC PNL TOTAL CA: CPT | Performed by: PHYSICIAN ASSISTANT

## 2020-10-19 PROCEDURE — 82948 REAGENT STRIP/BLOOD GLUCOSE: CPT

## 2020-10-19 PROCEDURE — 99024 POSTOP FOLLOW-UP VISIT: CPT | Performed by: NURSE PRACTITIONER

## 2020-10-19 PROCEDURE — 85007 BL SMEAR W/DIFF WBC COUNT: CPT | Performed by: PHYSICIAN ASSISTANT

## 2020-10-19 RX ADMIN — MIDODRINE HYDROCHLORIDE 5 MG: 5 TABLET ORAL at 11:32

## 2020-10-19 RX ADMIN — DEXAMETHASONE 4 MG: 4 TABLET ORAL at 22:48

## 2020-10-19 RX ADMIN — ACETAMINOPHEN 975 MG: 325 TABLET, FILM COATED ORAL at 06:17

## 2020-10-19 RX ADMIN — INSULIN LISPRO 1 UNITS: 100 INJECTION, SOLUTION INTRAVENOUS; SUBCUTANEOUS at 11:32

## 2020-10-19 RX ADMIN — HEPARIN SODIUM 5000 UNITS: 5000 INJECTION INTRAVENOUS; SUBCUTANEOUS at 13:34

## 2020-10-19 RX ADMIN — ACETAMINOPHEN 975 MG: 325 TABLET, FILM COATED ORAL at 13:35

## 2020-10-19 RX ADMIN — INSULIN LISPRO 1 UNITS: 100 INJECTION, SOLUTION INTRAVENOUS; SUBCUTANEOUS at 07:51

## 2020-10-19 RX ADMIN — INSULIN LISPRO 1 UNITS: 100 INJECTION, SOLUTION INTRAVENOUS; SUBCUTANEOUS at 22:49

## 2020-10-19 RX ADMIN — DEXAMETHASONE 4 MG: 4 TABLET ORAL at 13:36

## 2020-10-19 RX ADMIN — LEVETIRACETAM 750 MG: 100 INJECTION, SOLUTION INTRAVENOUS at 07:19

## 2020-10-19 RX ADMIN — MIDODRINE HYDROCHLORIDE 5 MG: 5 TABLET ORAL at 17:32

## 2020-10-19 RX ADMIN — ONDANSETRON 4 MG: 2 INJECTION INTRAMUSCULAR; INTRAVENOUS at 18:35

## 2020-10-19 RX ADMIN — INSULIN LISPRO 1 UNITS: 100 INJECTION, SOLUTION INTRAVENOUS; SUBCUTANEOUS at 17:32

## 2020-10-19 RX ADMIN — DEXAMETHASONE 4 MG: 4 TABLET ORAL at 06:18

## 2020-10-19 RX ADMIN — HEPARIN SODIUM 5000 UNITS: 5000 INJECTION INTRAVENOUS; SUBCUTANEOUS at 22:48

## 2020-10-19 RX ADMIN — ACETAMINOPHEN 975 MG: 325 TABLET, FILM COATED ORAL at 22:48

## 2020-10-19 RX ADMIN — HEPARIN SODIUM 5000 UNITS: 5000 INJECTION INTRAVENOUS; SUBCUTANEOUS at 06:17

## 2020-10-19 RX ADMIN — LEVETIRACETAM 750 MG: 100 INJECTION, SOLUTION INTRAVENOUS at 17:32

## 2020-10-20 LAB
ANION GAP SERPL CALCULATED.3IONS-SCNC: 6 MMOL/L (ref 4–13)
BUN SERPL-MCNC: 21 MG/DL (ref 5–25)
CALCIUM SERPL-MCNC: 8.6 MG/DL (ref 8.3–10.1)
CHLORIDE SERPL-SCNC: 104 MMOL/L (ref 100–108)
CO2 SERPL-SCNC: 28 MMOL/L (ref 21–32)
CREAT SERPL-MCNC: 0.77 MG/DL (ref 0.6–1.3)
ERYTHROCYTE [DISTWIDTH] IN BLOOD BY AUTOMATED COUNT: 12 % (ref 11.6–15.1)
GFR SERPL CREATININE-BSD FRML MDRD: 79 ML/MIN/1.73SQ M
GLUCOSE SERPL-MCNC: 142 MG/DL (ref 65–140)
GLUCOSE SERPL-MCNC: 144 MG/DL (ref 65–140)
GLUCOSE SERPL-MCNC: 147 MG/DL (ref 65–140)
GLUCOSE SERPL-MCNC: 148 MG/DL (ref 65–140)
GLUCOSE SERPL-MCNC: 174 MG/DL (ref 65–140)
HCT VFR BLD AUTO: 38.6 % (ref 34.8–46.1)
HGB BLD-MCNC: 13.2 G/DL (ref 11.5–15.4)
MCH RBC QN AUTO: 30 PG (ref 26.8–34.3)
MCHC RBC AUTO-ENTMCNC: 34.2 G/DL (ref 31.4–37.4)
MCV RBC AUTO: 88 FL (ref 82–98)
NRBC BLD AUTO-RTO: 0 /100 WBCS
PLATELET # BLD AUTO: 302 THOUSANDS/UL (ref 149–390)
PMV BLD AUTO: 12.5 FL (ref 8.9–12.7)
POTASSIUM SERPL-SCNC: 4 MMOL/L (ref 3.5–5.3)
RBC # BLD AUTO: 4.4 MILLION/UL (ref 3.81–5.12)
SODIUM SERPL-SCNC: 138 MMOL/L (ref 136–145)
WBC # BLD AUTO: 13.55 THOUSAND/UL (ref 4.31–10.16)

## 2020-10-20 PROCEDURE — 99024 POSTOP FOLLOW-UP VISIT: CPT | Performed by: NURSE PRACTITIONER

## 2020-10-20 PROCEDURE — 85027 COMPLETE CBC AUTOMATED: CPT | Performed by: NURSE PRACTITIONER

## 2020-10-20 PROCEDURE — 82948 REAGENT STRIP/BLOOD GLUCOSE: CPT

## 2020-10-20 PROCEDURE — 80048 BASIC METABOLIC PNL TOTAL CA: CPT | Performed by: NURSE PRACTITIONER

## 2020-10-20 RX ADMIN — DEXAMETHASONE 4 MG: 4 TABLET ORAL at 13:43

## 2020-10-20 RX ADMIN — HEPARIN SODIUM 5000 UNITS: 5000 INJECTION INTRAVENOUS; SUBCUTANEOUS at 06:08

## 2020-10-20 RX ADMIN — LEVETIRACETAM 750 MG: 100 INJECTION, SOLUTION INTRAVENOUS at 18:46

## 2020-10-20 RX ADMIN — DEXAMETHASONE 2 MG: 2 TABLET ORAL at 21:29

## 2020-10-20 RX ADMIN — MIDODRINE HYDROCHLORIDE 5 MG: 5 TABLET ORAL at 18:52

## 2020-10-20 RX ADMIN — INSULIN LISPRO 1 UNITS: 100 INJECTION, SOLUTION INTRAVENOUS; SUBCUTANEOUS at 21:29

## 2020-10-20 RX ADMIN — HEPARIN SODIUM 5000 UNITS: 5000 INJECTION INTRAVENOUS; SUBCUTANEOUS at 21:29

## 2020-10-20 RX ADMIN — ACETAMINOPHEN 975 MG: 325 TABLET, FILM COATED ORAL at 06:08

## 2020-10-20 RX ADMIN — ACETAMINOPHEN 975 MG: 325 TABLET, FILM COATED ORAL at 21:29

## 2020-10-20 RX ADMIN — DEXAMETHASONE 4 MG: 4 TABLET ORAL at 06:09

## 2020-10-20 RX ADMIN — MIDODRINE HYDROCHLORIDE 5 MG: 5 TABLET ORAL at 11:45

## 2020-10-20 RX ADMIN — HEPARIN SODIUM 5000 UNITS: 5000 INJECTION INTRAVENOUS; SUBCUTANEOUS at 13:42

## 2020-10-20 RX ADMIN — ACETAMINOPHEN 975 MG: 325 TABLET, FILM COATED ORAL at 13:42

## 2020-10-20 RX ADMIN — LEVETIRACETAM 750 MG: 100 INJECTION, SOLUTION INTRAVENOUS at 06:08

## 2020-10-20 RX ADMIN — MIDODRINE HYDROCHLORIDE 5 MG: 5 TABLET ORAL at 06:08

## 2020-10-21 LAB
GLUCOSE SERPL-MCNC: 127 MG/DL (ref 65–140)
GLUCOSE SERPL-MCNC: 130 MG/DL (ref 65–140)
GLUCOSE SERPL-MCNC: 142 MG/DL (ref 65–140)
GLUCOSE SERPL-MCNC: 149 MG/DL (ref 65–140)

## 2020-10-21 PROCEDURE — 97116 GAIT TRAINING THERAPY: CPT

## 2020-10-21 PROCEDURE — 99024 POSTOP FOLLOW-UP VISIT: CPT | Performed by: NURSE PRACTITIONER

## 2020-10-21 PROCEDURE — 97530 THERAPEUTIC ACTIVITIES: CPT

## 2020-10-21 PROCEDURE — 97112 NEUROMUSCULAR REEDUCATION: CPT

## 2020-10-21 PROCEDURE — 82948 REAGENT STRIP/BLOOD GLUCOSE: CPT

## 2020-10-21 PROCEDURE — 97535 SELF CARE MNGMENT TRAINING: CPT

## 2020-10-21 RX ADMIN — ACETAMINOPHEN 975 MG: 325 TABLET, FILM COATED ORAL at 05:28

## 2020-10-21 RX ADMIN — DEXAMETHASONE 2 MG: 2 TABLET ORAL at 11:44

## 2020-10-21 RX ADMIN — DEXAMETHASONE 2 MG: 2 TABLET ORAL at 05:29

## 2020-10-21 RX ADMIN — MIDODRINE HYDROCHLORIDE 5 MG: 5 TABLET ORAL at 18:51

## 2020-10-21 RX ADMIN — HEPARIN SODIUM 5000 UNITS: 5000 INJECTION INTRAVENOUS; SUBCUTANEOUS at 05:28

## 2020-10-21 RX ADMIN — MIDODRINE HYDROCHLORIDE 5 MG: 5 TABLET ORAL at 05:40

## 2020-10-21 RX ADMIN — HEPARIN SODIUM 5000 UNITS: 5000 INJECTION INTRAVENOUS; SUBCUTANEOUS at 13:08

## 2020-10-21 RX ADMIN — MIDODRINE HYDROCHLORIDE 5 MG: 5 TABLET ORAL at 11:44

## 2020-10-21 RX ADMIN — DEXAMETHASONE 2 MG: 2 TABLET ORAL at 18:52

## 2020-10-21 RX ADMIN — ACETAMINOPHEN 975 MG: 325 TABLET, FILM COATED ORAL at 21:02

## 2020-10-21 RX ADMIN — LEVETIRACETAM 750 MG: 100 INJECTION, SOLUTION INTRAVENOUS at 05:28

## 2020-10-21 RX ADMIN — HEPARIN SODIUM 5000 UNITS: 5000 INJECTION INTRAVENOUS; SUBCUTANEOUS at 21:02

## 2020-10-21 RX ADMIN — LEVETIRACETAM 750 MG: 100 INJECTION, SOLUTION INTRAVENOUS at 18:52

## 2020-10-21 RX ADMIN — ACETAMINOPHEN 975 MG: 325 TABLET, FILM COATED ORAL at 13:08

## 2020-10-22 LAB
GLUCOSE SERPL-MCNC: 114 MG/DL (ref 65–140)
GLUCOSE SERPL-MCNC: 125 MG/DL (ref 65–140)
GLUCOSE SERPL-MCNC: 128 MG/DL (ref 65–140)
GLUCOSE SERPL-MCNC: 129 MG/DL (ref 65–140)
SARS-COV-2 RNA RESP QL NAA+PROBE: NEGATIVE

## 2020-10-22 PROCEDURE — 99024 POSTOP FOLLOW-UP VISIT: CPT | Performed by: NURSE PRACTITIONER

## 2020-10-22 PROCEDURE — U0003 INFECTIOUS AGENT DETECTION BY NUCLEIC ACID (DNA OR RNA); SEVERE ACUTE RESPIRATORY SYNDROME CORONAVIRUS 2 (SARS-COV-2) (CORONAVIRUS DISEASE [COVID-19]), AMPLIFIED PROBE TECHNIQUE, MAKING USE OF HIGH THROUGHPUT TECHNOLOGIES AS DESCRIBED BY CMS-2020-01-R: HCPCS | Performed by: NURSE PRACTITIONER

## 2020-10-22 PROCEDURE — 82948 REAGENT STRIP/BLOOD GLUCOSE: CPT

## 2020-10-22 RX ADMIN — ACETAMINOPHEN 975 MG: 325 TABLET, FILM COATED ORAL at 06:34

## 2020-10-22 RX ADMIN — MIDODRINE HYDROCHLORIDE 5 MG: 5 TABLET ORAL at 06:34

## 2020-10-22 RX ADMIN — DEXAMETHASONE 2 MG: 2 TABLET ORAL at 23:24

## 2020-10-22 RX ADMIN — DEXAMETHASONE 2 MG: 2 TABLET ORAL at 18:13

## 2020-10-22 RX ADMIN — LEVETIRACETAM 750 MG: 100 INJECTION, SOLUTION INTRAVENOUS at 18:13

## 2020-10-22 RX ADMIN — DEXAMETHASONE 2 MG: 2 TABLET ORAL at 06:34

## 2020-10-22 RX ADMIN — MIDODRINE HYDROCHLORIDE 5 MG: 5 TABLET ORAL at 18:10

## 2020-10-22 RX ADMIN — HEPARIN SODIUM 5000 UNITS: 5000 INJECTION INTRAVENOUS; SUBCUTANEOUS at 06:34

## 2020-10-22 RX ADMIN — HEPARIN SODIUM 5000 UNITS: 5000 INJECTION INTRAVENOUS; SUBCUTANEOUS at 13:26

## 2020-10-22 RX ADMIN — DEXAMETHASONE 2 MG: 2 TABLET ORAL at 12:10

## 2020-10-22 RX ADMIN — LEVETIRACETAM 750 MG: 100 INJECTION, SOLUTION INTRAVENOUS at 06:39

## 2020-10-22 RX ADMIN — MIDODRINE HYDROCHLORIDE 5 MG: 5 TABLET ORAL at 12:10

## 2020-10-22 RX ADMIN — DEXAMETHASONE 2 MG: 2 TABLET ORAL at 01:35

## 2020-10-23 ENCOUNTER — HOSPITAL ENCOUNTER (INPATIENT)
Facility: HOSPITAL | Age: 70
LOS: 17 days | Discharge: HOME HEALTH | DRG: 949 | End: 2020-11-09
Attending: FAMILY MEDICINE | Admitting: FAMILY MEDICINE
Payer: COMMERCIAL

## 2020-10-23 VITALS
OXYGEN SATURATION: 98 % | BODY MASS INDEX: 26.63 KG/M2 | HEIGHT: 65 IN | TEMPERATURE: 98.3 F | HEART RATE: 48 BPM | DIASTOLIC BLOOD PRESSURE: 65 MMHG | SYSTOLIC BLOOD PRESSURE: 113 MMHG | WEIGHT: 159.83 LBS | RESPIRATION RATE: 19 BRPM

## 2020-10-23 DIAGNOSIS — D32.9 MENINGIOMA (HCC): ICD-10-CM

## 2020-10-23 DIAGNOSIS — G93.5 BRAIN COMPRESSION (HCC): ICD-10-CM

## 2020-10-23 DIAGNOSIS — G40.109 SIMPLE PARTIAL SEIZURES (HCC): Primary | ICD-10-CM

## 2020-10-23 PROBLEM — R73.03 PRE-DIABETES: Status: ACTIVE | Noted: 2020-10-23

## 2020-10-23 LAB
GLUCOSE SERPL-MCNC: 117 MG/DL (ref 65–140)
GLUCOSE SERPL-MCNC: 125 MG/DL (ref 65–140)
GLUCOSE SERPL-MCNC: 131 MG/DL (ref 65–140)
GLUCOSE SERPL-MCNC: 133 MG/DL (ref 65–140)

## 2020-10-23 PROCEDURE — 99024 POSTOP FOLLOW-UP VISIT: CPT | Performed by: PHYSICIAN ASSISTANT

## 2020-10-23 PROCEDURE — 99254 IP/OBS CNSLTJ NEW/EST MOD 60: CPT | Performed by: FAMILY MEDICINE

## 2020-10-23 PROCEDURE — 82948 REAGENT STRIP/BLOOD GLUCOSE: CPT

## 2020-10-23 PROCEDURE — 96125 COGNITIVE TEST BY HC PRO: CPT

## 2020-10-23 PROCEDURE — 99223 1ST HOSP IP/OBS HIGH 75: CPT | Performed by: FAMILY MEDICINE

## 2020-10-23 PROCEDURE — 92610 EVALUATE SWALLOWING FUNCTION: CPT

## 2020-10-23 RX ORDER — POLYETHYLENE GLYCOL 3350 17 G/17G
17 POWDER, FOR SOLUTION ORAL DAILY PRN
Status: CANCELLED | OUTPATIENT
Start: 2020-10-23

## 2020-10-23 RX ORDER — DEXAMETHASONE 2 MG/1
2 TABLET ORAL EVERY 8 HOURS SCHEDULED
Status: CANCELLED | OUTPATIENT
Start: 2020-10-23 | End: 2020-10-24

## 2020-10-23 RX ORDER — MECLIZINE HYDROCHLORIDE 25 MG/1
25 TABLET ORAL
Status: CANCELLED | OUTPATIENT
Start: 2020-10-23

## 2020-10-23 RX ORDER — AMOXICILLIN 250 MG
2 CAPSULE ORAL
Status: CANCELLED | OUTPATIENT
Start: 2020-10-23

## 2020-10-23 RX ORDER — HEPARIN SODIUM 5000 [USP'U]/ML
5000 INJECTION, SOLUTION INTRAVENOUS; SUBCUTANEOUS EVERY 8 HOURS SCHEDULED
Status: DISCONTINUED | OUTPATIENT
Start: 2020-10-23 | End: 2020-11-09 | Stop reason: HOSPADM

## 2020-10-23 RX ORDER — ACETAMINOPHEN 325 MG/1
975 TABLET ORAL EVERY 6 HOURS PRN
Status: DISCONTINUED | OUTPATIENT
Start: 2020-10-23 | End: 2020-11-09 | Stop reason: HOSPADM

## 2020-10-23 RX ORDER — DEXAMETHASONE 4 MG/1
2 TABLET ORAL EVERY 8 HOURS SCHEDULED
Status: COMPLETED | OUTPATIENT
Start: 2020-10-23 | End: 2020-10-24

## 2020-10-23 RX ORDER — ONDANSETRON 4 MG/1
4 TABLET, ORALLY DISINTEGRATING ORAL EVERY 6 HOURS PRN
Status: DISCONTINUED | OUTPATIENT
Start: 2020-10-23 | End: 2020-11-09 | Stop reason: HOSPADM

## 2020-10-23 RX ORDER — ACETAMINOPHEN 325 MG/1
975 TABLET ORAL EVERY 8 HOURS SCHEDULED
Status: DISCONTINUED | OUTPATIENT
Start: 2020-10-23 | End: 2020-10-23

## 2020-10-23 RX ORDER — ONDANSETRON 2 MG/ML
4 INJECTION INTRAMUSCULAR; INTRAVENOUS EVERY 6 HOURS PRN
Status: DISCONTINUED | OUTPATIENT
Start: 2020-10-23 | End: 2020-10-23

## 2020-10-23 RX ORDER — MIDODRINE HYDROCHLORIDE 5 MG/1
5 TABLET ORAL
Status: DISCONTINUED | OUTPATIENT
Start: 2020-10-23 | End: 2020-10-23

## 2020-10-23 RX ORDER — BISACODYL 10 MG
10 SUPPOSITORY, RECTAL RECTAL DAILY PRN
Status: CANCELLED | OUTPATIENT
Start: 2020-10-23

## 2020-10-23 RX ORDER — MIDODRINE HYDROCHLORIDE 5 MG/1
5 TABLET ORAL
Status: CANCELLED | OUTPATIENT
Start: 2020-10-23

## 2020-10-23 RX ORDER — DEXAMETHASONE 2 MG/1
2 TABLET ORAL EVERY 12 HOURS SCHEDULED
Status: CANCELLED | OUTPATIENT
Start: 2020-10-24 | End: 2020-10-26

## 2020-10-23 RX ORDER — DEXAMETHASONE 2 MG/1
2 TABLET ORAL DAILY
Status: CANCELLED | OUTPATIENT
Start: 2020-10-27 | End: 2020-10-29

## 2020-10-23 RX ORDER — DEXAMETHASONE 4 MG/1
2 TABLET ORAL DAILY
Status: COMPLETED | OUTPATIENT
Start: 2020-10-27 | End: 2020-10-28

## 2020-10-23 RX ORDER — AMOXICILLIN 250 MG
2 CAPSULE ORAL
Status: DISCONTINUED | OUTPATIENT
Start: 2020-10-23 | End: 2020-11-09 | Stop reason: HOSPADM

## 2020-10-23 RX ORDER — BISACODYL 10 MG
10 SUPPOSITORY, RECTAL RECTAL DAILY PRN
Status: DISCONTINUED | OUTPATIENT
Start: 2020-10-23 | End: 2020-11-09 | Stop reason: HOSPADM

## 2020-10-23 RX ORDER — POLYETHYLENE GLYCOL 3350 17 G/17G
17 POWDER, FOR SOLUTION ORAL DAILY PRN
Status: DISCONTINUED | OUTPATIENT
Start: 2020-10-23 | End: 2020-11-09 | Stop reason: HOSPADM

## 2020-10-23 RX ORDER — HEPARIN SODIUM 5000 [USP'U]/ML
5000 INJECTION, SOLUTION INTRAVENOUS; SUBCUTANEOUS EVERY 8 HOURS SCHEDULED
Status: CANCELLED | OUTPATIENT
Start: 2020-10-23

## 2020-10-23 RX ORDER — FAMOTIDINE 20 MG/1
20 TABLET, FILM COATED ORAL DAILY
Status: DISCONTINUED | OUTPATIENT
Start: 2020-10-23 | End: 2020-10-23

## 2020-10-23 RX ORDER — ACETAMINOPHEN 325 MG/1
975 TABLET ORAL EVERY 8 HOURS SCHEDULED
Status: CANCELLED | OUTPATIENT
Start: 2020-10-23

## 2020-10-23 RX ORDER — DEXAMETHASONE 4 MG/1
2 TABLET ORAL EVERY 12 HOURS SCHEDULED
Status: COMPLETED | OUTPATIENT
Start: 2020-10-24 | End: 2020-10-26

## 2020-10-23 RX ORDER — DOCUSATE SODIUM 100 MG/1
100 CAPSULE, LIQUID FILLED ORAL 2 TIMES DAILY
Status: DISCONTINUED | OUTPATIENT
Start: 2020-10-23 | End: 2020-11-04

## 2020-10-23 RX ORDER — ONDANSETRON 2 MG/ML
4 INJECTION INTRAMUSCULAR; INTRAVENOUS EVERY 6 HOURS PRN
Status: CANCELLED | OUTPATIENT
Start: 2020-10-23

## 2020-10-23 RX ORDER — MAGNESIUM HYDROXIDE/ALUMINUM HYDROXICE/SIMETHICONE 120; 1200; 1200 MG/30ML; MG/30ML; MG/30ML
30 SUSPENSION ORAL EVERY 4 HOURS PRN
Status: DISCONTINUED | OUTPATIENT
Start: 2020-10-23 | End: 2020-11-09 | Stop reason: HOSPADM

## 2020-10-23 RX ORDER — MECLIZINE HCL 12.5 MG/1
25 TABLET ORAL
Status: DISCONTINUED | OUTPATIENT
Start: 2020-10-23 | End: 2020-11-09 | Stop reason: HOSPADM

## 2020-10-23 RX ADMIN — DOCUSATE SODIUM 50 MG AND SENNOSIDES 8.6 MG 2 TABLET: 8.6; 5 TABLET, FILM COATED ORAL at 21:05

## 2020-10-23 RX ADMIN — HEPARIN SODIUM 5000 UNITS: 5000 INJECTION INTRAVENOUS; SUBCUTANEOUS at 05:20

## 2020-10-23 RX ADMIN — DEXAMETHASONE 2 MG: 2 TABLET ORAL at 05:21

## 2020-10-23 RX ADMIN — HEPARIN SODIUM 5000 UNITS: 5000 INJECTION INTRAVENOUS; SUBCUTANEOUS at 13:42

## 2020-10-23 RX ADMIN — DOCUSATE SODIUM 100 MG: 100 CAPSULE, LIQUID FILLED ORAL at 13:43

## 2020-10-23 RX ADMIN — LEVETIRACETAM 750 MG: 100 INJECTION, SOLUTION INTRAVENOUS at 05:21

## 2020-10-23 RX ADMIN — DEXAMETHASONE 2 MG: 4 TABLET ORAL at 21:05

## 2020-10-23 RX ADMIN — HEPARIN SODIUM 5000 UNITS: 5000 INJECTION INTRAVENOUS; SUBCUTANEOUS at 21:06

## 2020-10-23 RX ADMIN — LEVETIRACETAM 750 MG: 500 TABLET, FILM COATED ORAL at 21:05

## 2020-10-23 RX ADMIN — MIDODRINE HYDROCHLORIDE 5 MG: 5 TABLET ORAL at 06:00

## 2020-10-23 RX ADMIN — DEXAMETHASONE 2 MG: 4 TABLET ORAL at 16:16

## 2020-10-24 LAB
ANION GAP SERPL CALCULATED.3IONS-SCNC: 10 MMOL/L (ref 4–13)
BUN SERPL-MCNC: 24 MG/DL (ref 7–25)
CALCIUM SERPL-MCNC: 9.4 MG/DL (ref 8.6–10.5)
CHLORIDE SERPL-SCNC: 99 MMOL/L (ref 98–107)
CO2 SERPL-SCNC: 25 MMOL/L (ref 21–31)
CREAT SERPL-MCNC: 0.72 MG/DL (ref 0.6–1.2)
EOSINOPHIL # BLD AUTO: 0.18 THOUSAND/UL (ref 0–0.61)
EOSINOPHIL NFR BLD MANUAL: 1 % (ref 0–6)
ERYTHROCYTE [DISTWIDTH] IN BLOOD BY AUTOMATED COUNT: 12.9 % (ref 11.5–14.5)
GFR SERPL CREATININE-BSD FRML MDRD: 86 ML/MIN/1.73SQ M
GIANT PLATELETS BLD QL SMEAR: PRESENT
GLUCOSE SERPL-MCNC: 120 MG/DL (ref 65–140)
GLUCOSE SERPL-MCNC: 122 MG/DL (ref 65–140)
GLUCOSE SERPL-MCNC: 126 MG/DL (ref 65–140)
GLUCOSE SERPL-MCNC: 153 MG/DL (ref 65–99)
GLUCOSE SERPL-MCNC: 157 MG/DL (ref 65–140)
HCT VFR BLD AUTO: 42.6 % (ref 42–47)
HGB BLD-MCNC: 14.7 G/DL (ref 12–16)
LYMPHOCYTES # BLD AUTO: 1.76 THOUSAND/UL (ref 0.6–4.47)
LYMPHOCYTES # BLD AUTO: 10 % (ref 20–51)
MCH RBC QN AUTO: 30.3 PG (ref 26–34)
MCHC RBC AUTO-ENTMCNC: 34.6 G/DL (ref 31–37)
MCV RBC AUTO: 88 FL (ref 81–99)
MONOCYTES # BLD AUTO: 1.06 THOUSAND/UL (ref 0–1.22)
MONOCYTES NFR BLD AUTO: 6 % (ref 4–12)
NEUTS BAND NFR BLD MANUAL: 3 % (ref 0–8)
NEUTS SEG # BLD: 14.61 THOUSAND/UL (ref 1.81–6.82)
NEUTS SEG NFR BLD AUTO: 80 % (ref 42–75)
PLATELET # BLD AUTO: 306 THOUSANDS/UL (ref 149–390)
PLATELET BLD QL SMEAR: ADEQUATE
PMV BLD AUTO: 10.5 FL (ref 8.6–11.7)
POTASSIUM SERPL-SCNC: 4.4 MMOL/L (ref 3.5–5.5)
RBC # BLD AUTO: 4.86 MILLION/UL (ref 3.9–5.2)
RBC MORPH BLD: NORMAL
SODIUM SERPL-SCNC: 134 MMOL/L (ref 134–143)
TOTAL CELLS COUNTED SPEC: 100
WBC # BLD AUTO: 17.6 THOUSAND/UL (ref 4.8–10.8)

## 2020-10-24 PROCEDURE — 80048 BASIC METABOLIC PNL TOTAL CA: CPT | Performed by: FAMILY MEDICINE

## 2020-10-24 PROCEDURE — 97163 PT EVAL HIGH COMPLEX 45 MIN: CPT

## 2020-10-24 PROCEDURE — 82948 REAGENT STRIP/BLOOD GLUCOSE: CPT

## 2020-10-24 PROCEDURE — 97166 OT EVAL MOD COMPLEX 45 MIN: CPT

## 2020-10-24 PROCEDURE — 97535 SELF CARE MNGMENT TRAINING: CPT

## 2020-10-24 PROCEDURE — 97110 THERAPEUTIC EXERCISES: CPT

## 2020-10-24 PROCEDURE — 85027 COMPLETE CBC AUTOMATED: CPT | Performed by: FAMILY MEDICINE

## 2020-10-24 PROCEDURE — 97116 GAIT TRAINING THERAPY: CPT

## 2020-10-24 PROCEDURE — 97530 THERAPEUTIC ACTIVITIES: CPT

## 2020-10-24 PROCEDURE — 85007 BL SMEAR W/DIFF WBC COUNT: CPT | Performed by: FAMILY MEDICINE

## 2020-10-24 RX ADMIN — DOCUSATE SODIUM 50 MG AND SENNOSIDES 8.6 MG 2 TABLET: 8.6; 5 TABLET, FILM COATED ORAL at 21:45

## 2020-10-24 RX ADMIN — HEPARIN SODIUM 5000 UNITS: 5000 INJECTION INTRAVENOUS; SUBCUTANEOUS at 05:44

## 2020-10-24 RX ADMIN — HEPARIN SODIUM 5000 UNITS: 5000 INJECTION INTRAVENOUS; SUBCUTANEOUS at 14:34

## 2020-10-24 RX ADMIN — DOCUSATE SODIUM 100 MG: 100 CAPSULE, LIQUID FILLED ORAL at 08:26

## 2020-10-24 RX ADMIN — LEVETIRACETAM 750 MG: 500 TABLET, FILM COATED ORAL at 08:26

## 2020-10-24 RX ADMIN — DEXAMETHASONE 2 MG: 4 TABLET ORAL at 14:35

## 2020-10-24 RX ADMIN — DEXAMETHASONE 2 MG: 4 TABLET ORAL at 05:44

## 2020-10-24 RX ADMIN — DEXAMETHASONE 2 MG: 4 TABLET ORAL at 21:45

## 2020-10-24 RX ADMIN — LEVETIRACETAM 750 MG: 500 TABLET, FILM COATED ORAL at 21:45

## 2020-10-24 RX ADMIN — HEPARIN SODIUM 5000 UNITS: 5000 INJECTION INTRAVENOUS; SUBCUTANEOUS at 21:45

## 2020-10-24 RX ADMIN — INSULIN LISPRO 1 UNITS: 100 INJECTION, SOLUTION INTRAVENOUS; SUBCUTANEOUS at 21:46

## 2020-10-24 RX ADMIN — DOCUSATE SODIUM 100 MG: 100 CAPSULE, LIQUID FILLED ORAL at 18:10

## 2020-10-25 LAB
GLUCOSE SERPL-MCNC: 124 MG/DL (ref 65–140)
GLUCOSE SERPL-MCNC: 128 MG/DL (ref 65–140)
GLUCOSE SERPL-MCNC: 142 MG/DL (ref 65–140)
GLUCOSE SERPL-MCNC: 171 MG/DL (ref 65–140)

## 2020-10-25 PROCEDURE — 82948 REAGENT STRIP/BLOOD GLUCOSE: CPT

## 2020-10-25 PROCEDURE — 97530 THERAPEUTIC ACTIVITIES: CPT

## 2020-10-25 PROCEDURE — 97116 GAIT TRAINING THERAPY: CPT

## 2020-10-25 RX ADMIN — DEXAMETHASONE 2 MG: 4 TABLET ORAL at 09:20

## 2020-10-25 RX ADMIN — HEPARIN SODIUM 5000 UNITS: 5000 INJECTION INTRAVENOUS; SUBCUTANEOUS at 14:15

## 2020-10-25 RX ADMIN — LEVETIRACETAM 750 MG: 500 TABLET, FILM COATED ORAL at 21:05

## 2020-10-25 RX ADMIN — INSULIN LISPRO 1 UNITS: 100 INJECTION, SOLUTION INTRAVENOUS; SUBCUTANEOUS at 16:49

## 2020-10-25 RX ADMIN — DOCUSATE SODIUM 100 MG: 100 CAPSULE, LIQUID FILLED ORAL at 17:10

## 2020-10-25 RX ADMIN — LEVETIRACETAM 750 MG: 500 TABLET, FILM COATED ORAL at 09:20

## 2020-10-25 RX ADMIN — DOCUSATE SODIUM 50 MG AND SENNOSIDES 8.6 MG 2 TABLET: 8.6; 5 TABLET, FILM COATED ORAL at 21:05

## 2020-10-25 RX ADMIN — HEPARIN SODIUM 5000 UNITS: 5000 INJECTION INTRAVENOUS; SUBCUTANEOUS at 21:05

## 2020-10-25 RX ADMIN — DEXAMETHASONE 2 MG: 4 TABLET ORAL at 21:05

## 2020-10-25 RX ADMIN — HEPARIN SODIUM 5000 UNITS: 5000 INJECTION INTRAVENOUS; SUBCUTANEOUS at 05:19

## 2020-10-26 ENCOUNTER — TELEPHONE (OUTPATIENT)
Dept: NEUROSURGERY | Facility: CLINIC | Age: 70
End: 2020-10-26

## 2020-10-26 LAB
GLUCOSE SERPL-MCNC: 106 MG/DL (ref 65–140)
GLUCOSE SERPL-MCNC: 112 MG/DL (ref 65–140)
GLUCOSE SERPL-MCNC: 116 MG/DL (ref 65–140)
GLUCOSE SERPL-MCNC: 121 MG/DL (ref 65–140)

## 2020-10-26 PROCEDURE — 97116 GAIT TRAINING THERAPY: CPT

## 2020-10-26 PROCEDURE — 97129 THER IVNTJ 1ST 15 MIN: CPT

## 2020-10-26 PROCEDURE — 82948 REAGENT STRIP/BLOOD GLUCOSE: CPT

## 2020-10-26 PROCEDURE — 97542 WHEELCHAIR MNGMENT TRAINING: CPT

## 2020-10-26 PROCEDURE — 97112 NEUROMUSCULAR REEDUCATION: CPT

## 2020-10-26 PROCEDURE — 99232 SBSQ HOSP IP/OBS MODERATE 35: CPT | Performed by: FAMILY MEDICINE

## 2020-10-26 PROCEDURE — 97130 THER IVNTJ EA ADDL 15 MIN: CPT

## 2020-10-26 PROCEDURE — 97530 THERAPEUTIC ACTIVITIES: CPT

## 2020-10-26 PROCEDURE — 92526 ORAL FUNCTION THERAPY: CPT

## 2020-10-26 PROCEDURE — 97535 SELF CARE MNGMENT TRAINING: CPT

## 2020-10-26 PROCEDURE — 97110 THERAPEUTIC EXERCISES: CPT

## 2020-10-26 RX ADMIN — HEPARIN SODIUM 5000 UNITS: 5000 INJECTION INTRAVENOUS; SUBCUTANEOUS at 22:06

## 2020-10-26 RX ADMIN — HEPARIN SODIUM 5000 UNITS: 5000 INJECTION INTRAVENOUS; SUBCUTANEOUS at 14:17

## 2020-10-26 RX ADMIN — LEVETIRACETAM 750 MG: 500 TABLET, FILM COATED ORAL at 08:04

## 2020-10-26 RX ADMIN — HEPARIN SODIUM 5000 UNITS: 5000 INJECTION INTRAVENOUS; SUBCUTANEOUS at 05:37

## 2020-10-26 RX ADMIN — LEVETIRACETAM 750 MG: 500 TABLET, FILM COATED ORAL at 22:06

## 2020-10-26 RX ADMIN — DOCUSATE SODIUM 50 MG AND SENNOSIDES 8.6 MG 2 TABLET: 8.6; 5 TABLET, FILM COATED ORAL at 22:06

## 2020-10-26 RX ADMIN — DEXAMETHASONE 2 MG: 4 TABLET ORAL at 08:04

## 2020-10-27 LAB
ERYTHROCYTE [DISTWIDTH] IN BLOOD BY AUTOMATED COUNT: 13.4 % (ref 11.5–14.5)
GLUCOSE SERPL-MCNC: 104 MG/DL (ref 65–140)
HCT VFR BLD AUTO: 40.6 % (ref 42–47)
HGB BLD-MCNC: 13.7 G/DL (ref 12–16)
MCH RBC QN AUTO: 30.1 PG (ref 26–34)
MCHC RBC AUTO-ENTMCNC: 33.7 G/DL (ref 31–37)
MCV RBC AUTO: 89 FL (ref 81–99)
PLATELET # BLD AUTO: 278 THOUSANDS/UL (ref 149–390)
PMV BLD AUTO: 11.5 FL (ref 8.6–11.7)
RBC # BLD AUTO: 4.55 MILLION/UL (ref 3.9–5.2)
WBC # BLD AUTO: 19.2 THOUSAND/UL (ref 4.8–10.8)

## 2020-10-27 PROCEDURE — 97535 SELF CARE MNGMENT TRAINING: CPT

## 2020-10-27 PROCEDURE — 99233 SBSQ HOSP IP/OBS HIGH 50: CPT | Performed by: FAMILY MEDICINE

## 2020-10-27 PROCEDURE — 92507 TX SP LANG VOICE COMM INDIV: CPT

## 2020-10-27 PROCEDURE — 97112 NEUROMUSCULAR REEDUCATION: CPT

## 2020-10-27 PROCEDURE — 97530 THERAPEUTIC ACTIVITIES: CPT

## 2020-10-27 PROCEDURE — 85027 COMPLETE CBC AUTOMATED: CPT | Performed by: FAMILY MEDICINE

## 2020-10-27 PROCEDURE — 97110 THERAPEUTIC EXERCISES: CPT

## 2020-10-27 PROCEDURE — 92526 ORAL FUNCTION THERAPY: CPT

## 2020-10-27 PROCEDURE — 97116 GAIT TRAINING THERAPY: CPT

## 2020-10-27 PROCEDURE — 97542 WHEELCHAIR MNGMENT TRAINING: CPT

## 2020-10-27 PROCEDURE — 82948 REAGENT STRIP/BLOOD GLUCOSE: CPT

## 2020-10-27 RX ADMIN — HEPARIN SODIUM 5000 UNITS: 5000 INJECTION INTRAVENOUS; SUBCUTANEOUS at 21:59

## 2020-10-27 RX ADMIN — HEPARIN SODIUM 5000 UNITS: 5000 INJECTION INTRAVENOUS; SUBCUTANEOUS at 05:21

## 2020-10-27 RX ADMIN — HEPARIN SODIUM 5000 UNITS: 5000 INJECTION INTRAVENOUS; SUBCUTANEOUS at 14:07

## 2020-10-27 RX ADMIN — LEVETIRACETAM 750 MG: 500 TABLET, FILM COATED ORAL at 08:12

## 2020-10-27 RX ADMIN — LEVETIRACETAM 750 MG: 500 TABLET, FILM COATED ORAL at 21:58

## 2020-10-27 RX ADMIN — DEXAMETHASONE 2 MG: 4 TABLET ORAL at 08:12

## 2020-10-28 ENCOUNTER — TELEMEDICINE (OUTPATIENT)
Dept: NEUROSURGERY | Facility: CLINIC | Age: 70
End: 2020-10-28

## 2020-10-28 VITALS
RESPIRATION RATE: 16 BRPM | SYSTOLIC BLOOD PRESSURE: 93 MMHG | BODY MASS INDEX: 27 KG/M2 | HEART RATE: 60 BPM | DIASTOLIC BLOOD PRESSURE: 60 MMHG | WEIGHT: 162.04 LBS | TEMPERATURE: 97.1 F | HEIGHT: 65 IN

## 2020-10-28 DIAGNOSIS — G93.6 CEREBRAL EDEMA (HCC): ICD-10-CM

## 2020-10-28 DIAGNOSIS — D32.9 BENIGN MENINGIOMA (HCC): Primary | ICD-10-CM

## 2020-10-28 PROBLEM — G93.5 BRAIN COMPRESSION (HCC): Status: RESOLVED | Noted: 2020-09-11 | Resolved: 2020-10-28

## 2020-10-28 PROCEDURE — 99024 POSTOP FOLLOW-UP VISIT: CPT | Performed by: NEUROLOGICAL SURGERY

## 2020-10-28 PROCEDURE — 97112 NEUROMUSCULAR REEDUCATION: CPT

## 2020-10-28 PROCEDURE — 97130 THER IVNTJ EA ADDL 15 MIN: CPT

## 2020-10-28 PROCEDURE — 97110 THERAPEUTIC EXERCISES: CPT

## 2020-10-28 PROCEDURE — 97116 GAIT TRAINING THERAPY: CPT

## 2020-10-28 PROCEDURE — 97129 THER IVNTJ 1ST 15 MIN: CPT

## 2020-10-28 PROCEDURE — 97530 THERAPEUTIC ACTIVITIES: CPT

## 2020-10-28 PROCEDURE — 92526 ORAL FUNCTION THERAPY: CPT

## 2020-10-28 PROCEDURE — 97535 SELF CARE MNGMENT TRAINING: CPT

## 2020-10-28 RX ORDER — ONDANSETRON 4 MG/1
4 TABLET, FILM COATED ORAL AS NEEDED
COMMUNITY
End: 2020-11-09 | Stop reason: HOSPADM

## 2020-10-28 RX ORDER — SENNA PLUS 8.6 MG/1
1 TABLET ORAL
COMMUNITY
End: 2020-11-09 | Stop reason: HOSPADM

## 2020-10-28 RX ORDER — LEVETIRACETAM 750 MG/1
750 TABLET ORAL 2 TIMES DAILY
COMMUNITY
End: 2020-11-09 | Stop reason: HOSPADM

## 2020-10-28 RX ORDER — MAGNESIUM HYDROXIDE/ALUMINUM HYDROXICE/SIMETHICONE 120; 1200; 1200 MG/30ML; MG/30ML; MG/30ML
30 SUSPENSION ORAL AS NEEDED
COMMUNITY
End: 2020-11-09 | Stop reason: HOSPADM

## 2020-10-28 RX ORDER — POLYETHYLENE GLYCOL 3350 17 G/17G
17 POWDER, FOR SOLUTION ORAL AS NEEDED
COMMUNITY
End: 2020-11-09 | Stop reason: HOSPADM

## 2020-10-28 RX ORDER — BISACODYL 10 MG
10 SUPPOSITORY, RECTAL RECTAL AS NEEDED
COMMUNITY
End: 2020-11-09 | Stop reason: HOSPADM

## 2020-10-28 RX ADMIN — DOCUSATE SODIUM 100 MG: 100 CAPSULE, LIQUID FILLED ORAL at 08:46

## 2020-10-28 RX ADMIN — LEVETIRACETAM 750 MG: 500 TABLET, FILM COATED ORAL at 08:46

## 2020-10-28 RX ADMIN — HEPARIN SODIUM 5000 UNITS: 5000 INJECTION INTRAVENOUS; SUBCUTANEOUS at 05:00

## 2020-10-28 RX ADMIN — HEPARIN SODIUM 5000 UNITS: 5000 INJECTION INTRAVENOUS; SUBCUTANEOUS at 13:45

## 2020-10-28 RX ADMIN — LEVETIRACETAM 750 MG: 500 TABLET, FILM COATED ORAL at 21:35

## 2020-10-28 RX ADMIN — HEPARIN SODIUM 5000 UNITS: 5000 INJECTION INTRAVENOUS; SUBCUTANEOUS at 21:36

## 2020-10-28 RX ADMIN — DEXAMETHASONE 2 MG: 4 TABLET ORAL at 08:45

## 2020-10-29 PROCEDURE — 99231 SBSQ HOSP IP/OBS SF/LOW 25: CPT | Performed by: FAMILY MEDICINE

## 2020-10-29 PROCEDURE — 92507 TX SP LANG VOICE COMM INDIV: CPT

## 2020-10-29 PROCEDURE — 97535 SELF CARE MNGMENT TRAINING: CPT

## 2020-10-29 PROCEDURE — 97116 GAIT TRAINING THERAPY: CPT

## 2020-10-29 PROCEDURE — 97110 THERAPEUTIC EXERCISES: CPT

## 2020-10-29 PROCEDURE — 97530 THERAPEUTIC ACTIVITIES: CPT

## 2020-10-29 PROCEDURE — 92526 ORAL FUNCTION THERAPY: CPT

## 2020-10-29 RX ADMIN — HEPARIN SODIUM 5000 UNITS: 5000 INJECTION INTRAVENOUS; SUBCUTANEOUS at 06:03

## 2020-10-29 RX ADMIN — LEVETIRACETAM 750 MG: 500 TABLET, FILM COATED ORAL at 21:14

## 2020-10-29 RX ADMIN — HEPARIN SODIUM 5000 UNITS: 5000 INJECTION INTRAVENOUS; SUBCUTANEOUS at 14:35

## 2020-10-29 RX ADMIN — LEVETIRACETAM 750 MG: 500 TABLET, FILM COATED ORAL at 08:22

## 2020-10-29 RX ADMIN — HEPARIN SODIUM 5000 UNITS: 5000 INJECTION INTRAVENOUS; SUBCUTANEOUS at 21:14

## 2020-10-30 PROCEDURE — 92507 TX SP LANG VOICE COMM INDIV: CPT

## 2020-10-30 PROCEDURE — 97530 THERAPEUTIC ACTIVITIES: CPT

## 2020-10-30 PROCEDURE — 92526 ORAL FUNCTION THERAPY: CPT

## 2020-10-30 PROCEDURE — 99231 SBSQ HOSP IP/OBS SF/LOW 25: CPT | Performed by: FAMILY MEDICINE

## 2020-10-30 PROCEDURE — 97116 GAIT TRAINING THERAPY: CPT

## 2020-10-30 PROCEDURE — 97112 NEUROMUSCULAR REEDUCATION: CPT

## 2020-10-30 PROCEDURE — 97110 THERAPEUTIC EXERCISES: CPT

## 2020-10-30 RX ADMIN — LEVETIRACETAM 750 MG: 500 TABLET, FILM COATED ORAL at 21:03

## 2020-10-30 RX ADMIN — HEPARIN SODIUM 5000 UNITS: 5000 INJECTION INTRAVENOUS; SUBCUTANEOUS at 21:03

## 2020-10-30 RX ADMIN — LEVETIRACETAM 750 MG: 500 TABLET, FILM COATED ORAL at 08:21

## 2020-10-30 RX ADMIN — HEPARIN SODIUM 5000 UNITS: 5000 INJECTION INTRAVENOUS; SUBCUTANEOUS at 13:31

## 2020-10-30 RX ADMIN — HEPARIN SODIUM 5000 UNITS: 5000 INJECTION INTRAVENOUS; SUBCUTANEOUS at 05:26

## 2020-10-31 PROCEDURE — 97537 COMMUNITY/WORK REINTEGRATION: CPT

## 2020-10-31 PROCEDURE — 97535 SELF CARE MNGMENT TRAINING: CPT

## 2020-10-31 PROCEDURE — 97110 THERAPEUTIC EXERCISES: CPT

## 2020-10-31 PROCEDURE — 97116 GAIT TRAINING THERAPY: CPT

## 2020-10-31 PROCEDURE — 97530 THERAPEUTIC ACTIVITIES: CPT

## 2020-10-31 RX ADMIN — LEVETIRACETAM 750 MG: 500 TABLET, FILM COATED ORAL at 21:19

## 2020-10-31 RX ADMIN — HEPARIN SODIUM 5000 UNITS: 5000 INJECTION INTRAVENOUS; SUBCUTANEOUS at 14:36

## 2020-10-31 RX ADMIN — DOCUSATE SODIUM 100 MG: 100 CAPSULE, LIQUID FILLED ORAL at 08:57

## 2020-10-31 RX ADMIN — DOCUSATE SODIUM 50 MG AND SENNOSIDES 8.6 MG 2 TABLET: 8.6; 5 TABLET, FILM COATED ORAL at 21:19

## 2020-10-31 RX ADMIN — HEPARIN SODIUM 5000 UNITS: 5000 INJECTION INTRAVENOUS; SUBCUTANEOUS at 05:00

## 2020-10-31 RX ADMIN — LEVETIRACETAM 750 MG: 500 TABLET, FILM COATED ORAL at 08:57

## 2020-10-31 RX ADMIN — HEPARIN SODIUM 5000 UNITS: 5000 INJECTION INTRAVENOUS; SUBCUTANEOUS at 21:20

## 2020-10-31 RX ADMIN — DOCUSATE SODIUM 100 MG: 100 CAPSULE, LIQUID FILLED ORAL at 17:42

## 2020-11-01 RX ADMIN — HEPARIN SODIUM 5000 UNITS: 5000 INJECTION INTRAVENOUS; SUBCUTANEOUS at 21:34

## 2020-11-01 RX ADMIN — LEVETIRACETAM 750 MG: 500 TABLET, FILM COATED ORAL at 09:16

## 2020-11-01 RX ADMIN — HEPARIN SODIUM 5000 UNITS: 5000 INJECTION INTRAVENOUS; SUBCUTANEOUS at 13:39

## 2020-11-01 RX ADMIN — LEVETIRACETAM 750 MG: 500 TABLET, FILM COATED ORAL at 21:32

## 2020-11-01 RX ADMIN — DOCUSATE SODIUM 100 MG: 100 CAPSULE, LIQUID FILLED ORAL at 09:16

## 2020-11-01 RX ADMIN — DOCUSATE SODIUM 100 MG: 100 CAPSULE, LIQUID FILLED ORAL at 17:34

## 2020-11-01 RX ADMIN — HEPARIN SODIUM 5000 UNITS: 5000 INJECTION INTRAVENOUS; SUBCUTANEOUS at 05:42

## 2020-11-02 PROCEDURE — 97116 GAIT TRAINING THERAPY: CPT

## 2020-11-02 PROCEDURE — 92526 ORAL FUNCTION THERAPY: CPT

## 2020-11-02 PROCEDURE — 99232 SBSQ HOSP IP/OBS MODERATE 35: CPT | Performed by: PHYSICAL MEDICINE & REHABILITATION

## 2020-11-02 PROCEDURE — 97535 SELF CARE MNGMENT TRAINING: CPT

## 2020-11-02 PROCEDURE — 97130 THER IVNTJ EA ADDL 15 MIN: CPT

## 2020-11-02 PROCEDURE — 97110 THERAPEUTIC EXERCISES: CPT

## 2020-11-02 PROCEDURE — 97530 THERAPEUTIC ACTIVITIES: CPT

## 2020-11-02 PROCEDURE — 97129 THER IVNTJ 1ST 15 MIN: CPT

## 2020-11-02 PROCEDURE — 97537 COMMUNITY/WORK REINTEGRATION: CPT

## 2020-11-02 RX ADMIN — LEVETIRACETAM 750 MG: 500 TABLET, FILM COATED ORAL at 09:17

## 2020-11-02 RX ADMIN — DOCUSATE SODIUM 100 MG: 100 CAPSULE, LIQUID FILLED ORAL at 17:41

## 2020-11-02 RX ADMIN — DOCUSATE SODIUM 100 MG: 100 CAPSULE, LIQUID FILLED ORAL at 09:17

## 2020-11-02 RX ADMIN — ACETAMINOPHEN 975 MG: 325 TABLET ORAL at 22:33

## 2020-11-02 RX ADMIN — HEPARIN SODIUM 5000 UNITS: 5000 INJECTION INTRAVENOUS; SUBCUTANEOUS at 21:13

## 2020-11-02 RX ADMIN — HEPARIN SODIUM 5000 UNITS: 5000 INJECTION INTRAVENOUS; SUBCUTANEOUS at 05:47

## 2020-11-02 RX ADMIN — HEPARIN SODIUM 5000 UNITS: 5000 INJECTION INTRAVENOUS; SUBCUTANEOUS at 14:57

## 2020-11-02 RX ADMIN — LEVETIRACETAM 750 MG: 500 TABLET, FILM COATED ORAL at 21:13

## 2020-11-03 PROCEDURE — 97537 COMMUNITY/WORK REINTEGRATION: CPT

## 2020-11-03 PROCEDURE — 97535 SELF CARE MNGMENT TRAINING: CPT

## 2020-11-03 PROCEDURE — 97129 THER IVNTJ 1ST 15 MIN: CPT

## 2020-11-03 PROCEDURE — 97530 THERAPEUTIC ACTIVITIES: CPT

## 2020-11-03 PROCEDURE — 97130 THER IVNTJ EA ADDL 15 MIN: CPT

## 2020-11-03 PROCEDURE — 92526 ORAL FUNCTION THERAPY: CPT

## 2020-11-03 PROCEDURE — 99232 SBSQ HOSP IP/OBS MODERATE 35: CPT | Performed by: PHYSICAL MEDICINE & REHABILITATION

## 2020-11-03 PROCEDURE — 97116 GAIT TRAINING THERAPY: CPT

## 2020-11-03 PROCEDURE — 97110 THERAPEUTIC EXERCISES: CPT

## 2020-11-03 RX ADMIN — HEPARIN SODIUM 5000 UNITS: 5000 INJECTION INTRAVENOUS; SUBCUTANEOUS at 13:31

## 2020-11-03 RX ADMIN — HEPARIN SODIUM 5000 UNITS: 5000 INJECTION INTRAVENOUS; SUBCUTANEOUS at 05:06

## 2020-11-03 RX ADMIN — LEVETIRACETAM 750 MG: 500 TABLET, FILM COATED ORAL at 21:04

## 2020-11-03 RX ADMIN — HEPARIN SODIUM 5000 UNITS: 5000 INJECTION INTRAVENOUS; SUBCUTANEOUS at 21:04

## 2020-11-03 RX ADMIN — LEVETIRACETAM 750 MG: 500 TABLET, FILM COATED ORAL at 08:41

## 2020-11-04 PROCEDURE — 92507 TX SP LANG VOICE COMM INDIV: CPT

## 2020-11-04 PROCEDURE — 92526 ORAL FUNCTION THERAPY: CPT

## 2020-11-04 PROCEDURE — 97110 THERAPEUTIC EXERCISES: CPT

## 2020-11-04 PROCEDURE — 97537 COMMUNITY/WORK REINTEGRATION: CPT

## 2020-11-04 PROCEDURE — 99232 SBSQ HOSP IP/OBS MODERATE 35: CPT | Performed by: PHYSICAL MEDICINE & REHABILITATION

## 2020-11-04 PROCEDURE — 97535 SELF CARE MNGMENT TRAINING: CPT

## 2020-11-04 PROCEDURE — 97112 NEUROMUSCULAR REEDUCATION: CPT

## 2020-11-04 PROCEDURE — 97530 THERAPEUTIC ACTIVITIES: CPT

## 2020-11-04 PROCEDURE — 97116 GAIT TRAINING THERAPY: CPT

## 2020-11-04 RX ADMIN — HEPARIN SODIUM 5000 UNITS: 5000 INJECTION INTRAVENOUS; SUBCUTANEOUS at 13:46

## 2020-11-04 RX ADMIN — HEPARIN SODIUM 5000 UNITS: 5000 INJECTION INTRAVENOUS; SUBCUTANEOUS at 21:09

## 2020-11-04 RX ADMIN — LEVETIRACETAM 750 MG: 500 TABLET, FILM COATED ORAL at 21:08

## 2020-11-04 RX ADMIN — LEVETIRACETAM 750 MG: 500 TABLET, FILM COATED ORAL at 08:05

## 2020-11-04 RX ADMIN — HEPARIN SODIUM 5000 UNITS: 5000 INJECTION INTRAVENOUS; SUBCUTANEOUS at 06:05

## 2020-11-05 LAB
ANION GAP SERPL CALCULATED.3IONS-SCNC: 9 MMOL/L (ref 4–13)
BASOPHILS # BLD AUTO: 0 THOUSANDS/ΜL (ref 0–0.1)
BASOPHILS NFR BLD AUTO: 1 % (ref 0–2)
BUN SERPL-MCNC: 12 MG/DL (ref 7–25)
CALCIUM SERPL-MCNC: 9.6 MG/DL (ref 8.6–10.5)
CHLORIDE SERPL-SCNC: 105 MMOL/L (ref 98–107)
CO2 SERPL-SCNC: 25 MMOL/L (ref 21–31)
CREAT SERPL-MCNC: 0.63 MG/DL (ref 0.6–1.2)
EOSINOPHIL # BLD AUTO: 0.3 THOUSAND/ΜL (ref 0–0.61)
EOSINOPHIL NFR BLD AUTO: 5 % (ref 0–5)
ERYTHROCYTE [DISTWIDTH] IN BLOOD BY AUTOMATED COUNT: 13.5 % (ref 11.5–14.5)
GFR SERPL CREATININE-BSD FRML MDRD: 92 ML/MIN/1.73SQ M
GLUCOSE P FAST SERPL-MCNC: 122 MG/DL (ref 65–99)
GLUCOSE SERPL-MCNC: 122 MG/DL (ref 65–99)
HCT VFR BLD AUTO: 36.4 % (ref 42–47)
HGB BLD-MCNC: 12.7 G/DL (ref 12–16)
LYMPHOCYTES # BLD AUTO: 2.6 THOUSANDS/ΜL (ref 0.6–4.47)
LYMPHOCYTES NFR BLD AUTO: 34 % (ref 21–51)
MCH RBC QN AUTO: 31.3 PG (ref 26–34)
MCHC RBC AUTO-ENTMCNC: 35 G/DL (ref 31–37)
MCV RBC AUTO: 90 FL (ref 81–99)
MONOCYTES # BLD AUTO: 0.6 THOUSAND/ΜL (ref 0.17–1.22)
MONOCYTES NFR BLD AUTO: 8 % (ref 2–12)
NEUTROPHILS # BLD AUTO: 4 THOUSANDS/ΜL (ref 1.4–6.5)
NEUTS SEG NFR BLD AUTO: 53 % (ref 42–75)
PLATELET # BLD AUTO: 227 THOUSANDS/UL (ref 149–390)
PMV BLD AUTO: 9.4 FL (ref 8.6–11.7)
POTASSIUM SERPL-SCNC: 3.9 MMOL/L (ref 3.5–5.5)
RBC # BLD AUTO: 4.07 MILLION/UL (ref 3.9–5.2)
SODIUM SERPL-SCNC: 139 MMOL/L (ref 134–143)
WBC # BLD AUTO: 7.6 THOUSAND/UL (ref 4.8–10.8)

## 2020-11-05 PROCEDURE — 97530 THERAPEUTIC ACTIVITIES: CPT

## 2020-11-05 PROCEDURE — 97110 THERAPEUTIC EXERCISES: CPT

## 2020-11-05 PROCEDURE — 80048 BASIC METABOLIC PNL TOTAL CA: CPT | Performed by: FAMILY MEDICINE

## 2020-11-05 PROCEDURE — 85025 COMPLETE CBC W/AUTO DIFF WBC: CPT | Performed by: FAMILY MEDICINE

## 2020-11-05 PROCEDURE — 99231 SBSQ HOSP IP/OBS SF/LOW 25: CPT | Performed by: PHYSICAL MEDICINE & REHABILITATION

## 2020-11-05 PROCEDURE — 97130 THER IVNTJ EA ADDL 15 MIN: CPT

## 2020-11-05 PROCEDURE — 97535 SELF CARE MNGMENT TRAINING: CPT

## 2020-11-05 PROCEDURE — 97129 THER IVNTJ 1ST 15 MIN: CPT

## 2020-11-05 PROCEDURE — 97537 COMMUNITY/WORK REINTEGRATION: CPT

## 2020-11-05 PROCEDURE — 97112 NEUROMUSCULAR REEDUCATION: CPT

## 2020-11-05 PROCEDURE — 92526 ORAL FUNCTION THERAPY: CPT

## 2020-11-05 PROCEDURE — 97116 GAIT TRAINING THERAPY: CPT

## 2020-11-05 RX ADMIN — LEVETIRACETAM 750 MG: 500 TABLET, FILM COATED ORAL at 21:10

## 2020-11-05 RX ADMIN — HEPARIN SODIUM 5000 UNITS: 5000 INJECTION INTRAVENOUS; SUBCUTANEOUS at 21:11

## 2020-11-05 RX ADMIN — HEPARIN SODIUM 5000 UNITS: 5000 INJECTION INTRAVENOUS; SUBCUTANEOUS at 15:11

## 2020-11-05 RX ADMIN — DOCUSATE SODIUM 50 MG AND SENNOSIDES 8.6 MG 2 TABLET: 8.6; 5 TABLET, FILM COATED ORAL at 21:11

## 2020-11-05 RX ADMIN — LEVETIRACETAM 750 MG: 500 TABLET, FILM COATED ORAL at 09:42

## 2020-11-05 RX ADMIN — HEPARIN SODIUM 5000 UNITS: 5000 INJECTION INTRAVENOUS; SUBCUTANEOUS at 05:19

## 2020-11-06 PROCEDURE — 97116 GAIT TRAINING THERAPY: CPT

## 2020-11-06 PROCEDURE — 97110 THERAPEUTIC EXERCISES: CPT

## 2020-11-06 PROCEDURE — 92526 ORAL FUNCTION THERAPY: CPT

## 2020-11-06 PROCEDURE — 97535 SELF CARE MNGMENT TRAINING: CPT

## 2020-11-06 PROCEDURE — 97537 COMMUNITY/WORK REINTEGRATION: CPT

## 2020-11-06 PROCEDURE — 99231 SBSQ HOSP IP/OBS SF/LOW 25: CPT | Performed by: PHYSICAL MEDICINE & REHABILITATION

## 2020-11-06 PROCEDURE — 97530 THERAPEUTIC ACTIVITIES: CPT

## 2020-11-06 RX ADMIN — HEPARIN SODIUM 5000 UNITS: 5000 INJECTION INTRAVENOUS; SUBCUTANEOUS at 13:52

## 2020-11-06 RX ADMIN — LEVETIRACETAM 750 MG: 500 TABLET, FILM COATED ORAL at 22:58

## 2020-11-06 RX ADMIN — LEVETIRACETAM 750 MG: 500 TABLET, FILM COATED ORAL at 08:33

## 2020-11-06 RX ADMIN — HEPARIN SODIUM 5000 UNITS: 5000 INJECTION INTRAVENOUS; SUBCUTANEOUS at 22:58

## 2020-11-06 RX ADMIN — HEPARIN SODIUM 5000 UNITS: 5000 INJECTION INTRAVENOUS; SUBCUTANEOUS at 05:27

## 2020-11-07 PROCEDURE — 97530 THERAPEUTIC ACTIVITIES: CPT

## 2020-11-07 PROCEDURE — 97110 THERAPEUTIC EXERCISES: CPT

## 2020-11-07 PROCEDURE — 97116 GAIT TRAINING THERAPY: CPT

## 2020-11-07 PROCEDURE — 97535 SELF CARE MNGMENT TRAINING: CPT

## 2020-11-07 PROCEDURE — 97112 NEUROMUSCULAR REEDUCATION: CPT

## 2020-11-07 RX ADMIN — HEPARIN SODIUM 5000 UNITS: 5000 INJECTION INTRAVENOUS; SUBCUTANEOUS at 13:50

## 2020-11-07 RX ADMIN — LEVETIRACETAM 750 MG: 500 TABLET, FILM COATED ORAL at 21:32

## 2020-11-07 RX ADMIN — HEPARIN SODIUM 5000 UNITS: 5000 INJECTION INTRAVENOUS; SUBCUTANEOUS at 05:12

## 2020-11-07 RX ADMIN — DOCUSATE SODIUM 50 MG AND SENNOSIDES 8.6 MG 2 TABLET: 8.6; 5 TABLET, FILM COATED ORAL at 21:32

## 2020-11-07 RX ADMIN — LEVETIRACETAM 750 MG: 500 TABLET, FILM COATED ORAL at 08:49

## 2020-11-07 RX ADMIN — HEPARIN SODIUM 5000 UNITS: 5000 INJECTION INTRAVENOUS; SUBCUTANEOUS at 21:32

## 2020-11-08 PROCEDURE — 97110 THERAPEUTIC EXERCISES: CPT

## 2020-11-08 PROCEDURE — 97530 THERAPEUTIC ACTIVITIES: CPT

## 2020-11-08 PROCEDURE — 97116 GAIT TRAINING THERAPY: CPT

## 2020-11-08 RX ADMIN — HEPARIN SODIUM 5000 UNITS: 5000 INJECTION INTRAVENOUS; SUBCUTANEOUS at 05:48

## 2020-11-08 RX ADMIN — HEPARIN SODIUM 5000 UNITS: 5000 INJECTION INTRAVENOUS; SUBCUTANEOUS at 21:21

## 2020-11-08 RX ADMIN — HEPARIN SODIUM 5000 UNITS: 5000 INJECTION INTRAVENOUS; SUBCUTANEOUS at 14:23

## 2020-11-08 RX ADMIN — LEVETIRACETAM 750 MG: 500 TABLET, FILM COATED ORAL at 08:45

## 2020-11-08 RX ADMIN — LEVETIRACETAM 750 MG: 500 TABLET, FILM COATED ORAL at 21:18

## 2020-11-09 VITALS
HEART RATE: 70 BPM | BODY MASS INDEX: 28.03 KG/M2 | TEMPERATURE: 97.4 F | RESPIRATION RATE: 18 BRPM | WEIGHT: 168.21 LBS | SYSTOLIC BLOOD PRESSURE: 118 MMHG | HEIGHT: 65 IN | OXYGEN SATURATION: 99 % | DIASTOLIC BLOOD PRESSURE: 50 MMHG

## 2020-11-09 PROCEDURE — 99238 HOSP IP/OBS DSCHRG MGMT 30/<: CPT | Performed by: PHYSICAL MEDICINE & REHABILITATION

## 2020-11-09 PROCEDURE — 97110 THERAPEUTIC EXERCISES: CPT

## 2020-11-09 PROCEDURE — 97116 GAIT TRAINING THERAPY: CPT

## 2020-11-09 PROCEDURE — 97530 THERAPEUTIC ACTIVITIES: CPT

## 2020-11-09 PROCEDURE — 97537 COMMUNITY/WORK REINTEGRATION: CPT

## 2020-11-09 PROCEDURE — 97535 SELF CARE MNGMENT TRAINING: CPT

## 2020-11-09 RX ORDER — LEVETIRACETAM 750 MG/1
750 TABLET ORAL EVERY 12 HOURS SCHEDULED
Qty: 60 TABLET | Refills: 0 | Status: SHIPPED | OUTPATIENT
Start: 2020-11-09 | End: 2020-12-08 | Stop reason: SDUPTHER

## 2020-11-09 RX ADMIN — LEVETIRACETAM 750 MG: 500 TABLET, FILM COATED ORAL at 08:36

## 2020-11-09 RX ADMIN — HEPARIN SODIUM 5000 UNITS: 5000 INJECTION INTRAVENOUS; SUBCUTANEOUS at 06:03

## 2020-11-16 ENCOUNTER — OFFICE VISIT (OUTPATIENT)
Dept: FAMILY MEDICINE CLINIC | Facility: CLINIC | Age: 70
End: 2020-11-16
Payer: COMMERCIAL

## 2020-11-16 VITALS
HEART RATE: 95 BPM | SYSTOLIC BLOOD PRESSURE: 100 MMHG | TEMPERATURE: 98.8 F | HEIGHT: 65 IN | RESPIRATION RATE: 18 BRPM | OXYGEN SATURATION: 97 % | WEIGHT: 160 LBS | BODY MASS INDEX: 26.66 KG/M2 | DIASTOLIC BLOOD PRESSURE: 70 MMHG

## 2020-11-16 DIAGNOSIS — Z13.220 ENCOUNTER FOR SCREENING FOR LIPID DISORDER: ICD-10-CM

## 2020-11-16 DIAGNOSIS — G40.109 SIMPLE PARTIAL SEIZURES (HCC): ICD-10-CM

## 2020-11-16 DIAGNOSIS — D32.9 BENIGN MENINGIOMA (HCC): ICD-10-CM

## 2020-11-16 DIAGNOSIS — Z12.11 SCREENING FOR COLON CANCER: Primary | ICD-10-CM

## 2020-11-16 PROCEDURE — 1160F RVW MEDS BY RX/DR IN RCRD: CPT | Performed by: FAMILY MEDICINE

## 2020-11-16 PROCEDURE — 99214 OFFICE O/P EST MOD 30 MIN: CPT | Performed by: FAMILY MEDICINE

## 2020-11-16 PROCEDURE — 3008F BODY MASS INDEX DOCD: CPT | Performed by: FAMILY MEDICINE

## 2020-11-16 PROCEDURE — 1036F TOBACCO NON-USER: CPT | Performed by: FAMILY MEDICINE

## 2020-11-19 ENCOUNTER — TELEPHONE (OUTPATIENT)
Dept: FAMILY MEDICINE CLINIC | Facility: CLINIC | Age: 70
End: 2020-11-19

## 2020-11-24 ENCOUNTER — OFFICE VISIT (OUTPATIENT)
Dept: NEUROSURGERY | Facility: CLINIC | Age: 70
End: 2020-11-24

## 2020-11-24 VITALS
HEART RATE: 81 BPM | DIASTOLIC BLOOD PRESSURE: 72 MMHG | HEIGHT: 65 IN | RESPIRATION RATE: 16 BRPM | BODY MASS INDEX: 26.63 KG/M2 | SYSTOLIC BLOOD PRESSURE: 130 MMHG | TEMPERATURE: 98.7 F

## 2020-11-24 DIAGNOSIS — R29.898 RIGHT LEG WEAKNESS: ICD-10-CM

## 2020-11-24 DIAGNOSIS — D32.9 BENIGN MENINGIOMA (HCC): Primary | ICD-10-CM

## 2020-11-24 PROCEDURE — 99024 POSTOP FOLLOW-UP VISIT: CPT | Performed by: PHYSICIAN ASSISTANT

## 2020-12-08 DIAGNOSIS — Z86.018 HISTORY OF MENINGIOMA: ICD-10-CM

## 2020-12-08 DIAGNOSIS — D32.9 MENINGIOMA (HCC): ICD-10-CM

## 2020-12-08 DIAGNOSIS — G93.6 CEREBRAL EDEMA (HCC): ICD-10-CM

## 2020-12-08 DIAGNOSIS — Z98.890 S/P RESECTION OF MENINGIOMA: ICD-10-CM

## 2020-12-08 DIAGNOSIS — D32.9 BENIGN MENINGIOMA (HCC): Primary | ICD-10-CM

## 2020-12-08 DIAGNOSIS — G40.109 SIMPLE PARTIAL SEIZURES (HCC): ICD-10-CM

## 2020-12-08 DIAGNOSIS — Z86.018 S/P RESECTION OF MENINGIOMA: ICD-10-CM

## 2020-12-08 RX ORDER — LEVETIRACETAM 750 MG/1
750 TABLET ORAL EVERY 12 HOURS SCHEDULED
Qty: 60 TABLET | Refills: 0 | Status: SHIPPED | OUTPATIENT
Start: 2020-12-08 | End: 2021-01-05 | Stop reason: SDUPTHER

## 2021-01-05 DIAGNOSIS — G40.109 SIMPLE PARTIAL SEIZURES (HCC): ICD-10-CM

## 2021-01-05 RX ORDER — LEVETIRACETAM 750 MG/1
750 TABLET ORAL EVERY 12 HOURS SCHEDULED
Qty: 60 TABLET | Refills: 0 | Status: SHIPPED | OUTPATIENT
Start: 2021-01-05 | End: 2021-02-03

## 2021-01-07 ENCOUNTER — TELEPHONE (OUTPATIENT)
Dept: NEUROLOGY | Facility: CLINIC | Age: 71
End: 2021-01-07

## 2021-01-07 DIAGNOSIS — G40.109 PARTIAL SYMPTOMATIC EPILEPSY WITH SIMPLE PARTIAL SEIZURES, NOT INTRACTABLE, WITHOUT STATUS EPILEPTICUS (HCC): Primary | ICD-10-CM

## 2021-01-07 NOTE — TELEPHONE ENCOUNTER
Spoke with patient's  ( patient not available) and recommended continuing with the 750 mg dose  Will check a Keppra level and an EEG prior to her scheduled appointment to see me next month

## 2021-01-07 NOTE — TELEPHONE ENCOUNTER
Patient calling in  States she had brain surgery in October and was placed on Keppra 750 mg  Patient states that before surgery she believes she was on 500 mg and would like to know if it can be reduced back to that  Is not having any side effects while on the 750 mg  Still has numbness/tingling right leg and arm which she had prior to surgery  Please advise       cb#: [phone], okay to leave detailed message

## 2021-01-07 NOTE — TELEPHONE ENCOUNTER
Called and left message, for patient that Dr Geetha Larkin have an order for EEG, and Lab  I gave phone number to patient for central scheduling to schedule EEG

## 2021-01-11 ENCOUNTER — VBI (OUTPATIENT)
Dept: ADMINISTRATIVE | Facility: OTHER | Age: 71
End: 2021-01-11

## 2021-01-11 ENCOUNTER — TELEPHONE (OUTPATIENT)
Dept: INTERVENTIONAL RADIOLOGY/VASCULAR | Facility: HOSPITAL | Age: 71
End: 2021-01-11

## 2021-01-11 ENCOUNTER — HOSPITAL ENCOUNTER (OUTPATIENT)
Dept: MRI IMAGING | Facility: HOSPITAL | Age: 71
Discharge: HOME | End: 2021-01-11
Payer: COMMERCIAL

## 2021-01-11 ENCOUNTER — APPOINTMENT (OUTPATIENT)
Dept: LAB | Facility: HOSPITAL | Age: 71
End: 2021-01-11
Attending: PSYCHIATRY & NEUROLOGY
Payer: COMMERCIAL

## 2021-01-11 DIAGNOSIS — G40.109 PARTIAL SYMPTOMATIC EPILEPSY WITH SIMPLE PARTIAL SEIZURES, NOT INTRACTABLE, WITHOUT STATUS EPILEPTICUS (HCC): ICD-10-CM

## 2021-01-11 DIAGNOSIS — T50.905S ADVERSE EFFECT OF DRUG, SEQUELA: ICD-10-CM

## 2021-01-11 DIAGNOSIS — T50.905A ADVERSE DRUG REACTION: Primary | ICD-10-CM

## 2021-01-11 DIAGNOSIS — D32.9 BENIGN MENINGIOMA (HCC): ICD-10-CM

## 2021-01-11 DIAGNOSIS — T50.8X5S: ICD-10-CM

## 2021-01-11 PROCEDURE — 80177 DRUG SCRN QUAN LEVETIRACETAM: CPT

## 2021-01-11 PROCEDURE — 36415 COLL VENOUS BLD VENIPUNCTURE: CPT

## 2021-01-11 RX ORDER — PREDNISONE 50 MG/1
TABLET ORAL
Qty: 3 TABLET | Refills: 0 | Status: SHIPPED | OUTPATIENT
Start: 2021-01-11 | End: 2021-01-18

## 2021-01-11 NOTE — TELEPHONE ENCOUNTER
Received a call from  MRI stating the patient was there for her MRI this afternoon and advised them she has an allergy to MRI contrast      No documented allergy and no prep previously ordered  She has had MRI with contrast completed 2x in the past and no documented reaction  She reportedly told MRI tech that following her past MRI she got very sick and had a seizure  Did not see documentation to support this  Discussed situation with BENY MAURO  Since patient reports seizure activity following MRI, unknown correlation  Ordered contrast prep to be on the safe side  MRI able to squeeze her tomorrow 930 am for MRI she was directed to arrive 15 minutes early  Attempted to call patient to discuss plan  Left message to return call will attempt to call her again later today if no callback received

## 2021-01-11 NOTE — TELEPHONE ENCOUNTER
Call received from Stephanie she said she spoke with the patient and discussed her concerns for contrast allergy  Reviewed my findings in the chart and discussion with BENY MAURO resulting in agreeing to prep to ensure no adverse reaction though no definitive evidence of contrast allergy determined  Placed order for contrast prep  Colt Lakhani stated she intended to call the patient back to review our plan with her and will provide prep instructions  She was appreciative of the assistance

## 2021-01-12 ENCOUNTER — HOSPITAL ENCOUNTER (OUTPATIENT)
Dept: MRI IMAGING | Facility: HOSPITAL | Age: 71
Discharge: HOME | End: 2021-01-12
Payer: COMMERCIAL

## 2021-01-12 PROCEDURE — 70553 MRI BRAIN STEM W/O & W/DYE: CPT

## 2021-01-12 PROCEDURE — A9585 GADOBUTROL INJECTION: HCPCS | Performed by: PHYSICIAN ASSISTANT

## 2021-01-12 PROCEDURE — G1004 CDSM NDSC: HCPCS

## 2021-01-12 RX ADMIN — GADOBUTROL 7 ML: 604.72 INJECTION INTRAVENOUS at 10:04

## 2021-01-14 LAB — LEVETIRACETAM SERPL-MCNC: 35.4 UG/ML (ref 10–40)

## 2021-01-18 ENCOUNTER — OFFICE VISIT (OUTPATIENT)
Dept: NEUROSURGERY | Facility: CLINIC | Age: 71
End: 2021-01-18

## 2021-01-18 VITALS
WEIGHT: 160 LBS | HEIGHT: 65 IN | HEART RATE: 71 BPM | BODY MASS INDEX: 26.66 KG/M2 | SYSTOLIC BLOOD PRESSURE: 115 MMHG | DIASTOLIC BLOOD PRESSURE: 68 MMHG | TEMPERATURE: 98.4 F

## 2021-01-18 DIAGNOSIS — D32.9 BENIGN MENINGIOMA (HCC): Primary | ICD-10-CM

## 2021-01-18 PROCEDURE — 99024 POSTOP FOLLOW-UP VISIT: CPT | Performed by: NEUROLOGICAL SURGERY

## 2021-01-18 NOTE — ASSESSMENT & PLAN NOTE
· Approximately 3 months s/p left frontoparietal craniotomy for mass resection on 10/15/2020 with Dr Justice Smith  · Pathology consistent with Grade 1 Meningioma  · Residual RLE weakness from surgery  · [address] on 1/18/21    Imaging:   · MRI brain 1/11/2021:  Evolving postoperative changes tingling resection of left frontoparietal vertex meningioma  Perilesional encephalomalacia with reduction and vasogenic edema  Some dural thickening likely related to scar build up  Plan  · Ongoing use of ankle orthotic for support when ambulating  · Ambulatory PT order placed at request of patient from home therapy providers  · Will continue with ongoing surveillance in the post operative period  Follow up in 3 months with repeat MRI brain  · Discussed plan of care with patient who showed understanding  No additional questions or concerns at this time  · Patient made aware to contact neurosurgery with any questions or concerns

## 2021-01-18 NOTE — PATIENT INSTRUCTIONS
Meningioma   WHAT YOU NEED TO KNOW:   A meningioma is a tumor that starts in the meninges of the brain and spinal cord  The meninges are the tissues that cover the brain and spinal cord  They prevent germs and other substances from entering the brain and spinal cord  Most meningiomas are slow-growing and benign (not cancer)  DISCHARGE INSTRUCTIONS:   Medicines:   · Medicines  may be given to kill the tumor cells and decrease the size of the meningioma  · Take your medicine as directed  Contact your healthcare provider if you think your medicine is not helping or if you have side effects  Tell him or her if you are allergic to any medicine  Keep a list of the medicines, vitamins, and herbs you take  Include the amounts, and when and why you take them  Bring the list or the pill bottles to follow-up visits  Carry your medicine list with you in case of an emergency  Follow up with your healthcare provider or surgeon as directed:  Write down your questions so you remember to ask them during your visits  Self-care:   · Drink liquids as directed  Ask how much liquid to drink each day and which liquids are best for you  If you have nausea or diarrhea from treatment, extra liquids may help decrease your risk for dehydration  · Eat healthy foods  Healthy foods include fruits, vegetables, whole-grain breads, low-fat dairy products, beans, lean meats, and fish  This may help you feel better during treatment and decrease side effects  You may need to change what you eat during treatment  Do not eat foods or drink liquids that cause gas, such as cabbage, beans, onions, or soft drinks  A nutritionist may help to plan the best meals and snacks for you  · Exercise  Ask about the best exercise plan for you  Exercise may improve your energy levels and appetite  Contact your healthcare provider if:   · You have a fever  · You vomit repeatedly, and cannot keep any food or liquids down      · You have a severe headache, or you feel dizzy  · You have questions or concerns about your condition or care  Return to the emergency department if:   · You have any of the following signs of a stroke:      ? Numbness or drooping on one side of your face     ? Weakness in an arm or leg    ? Confusion or difficulty speaking    ? Dizziness, a severe headache, or vision loss      © Copyright [address] Information is for End User's use only and may not be sold, redistributed or otherwise used for commercial purposes  All illustrations and images included in CareNotes® are the copyrighted property of [employer]  or [facility]   The above information is an  only  It is not intended as medical advice for individual conditions or treatments  Talk to your doctor, nurse or pharmacist before following any medical regimen to see if it is safe and effective for you

## 2021-01-18 NOTE — PROGRESS NOTES
Neurosurgery Office Note  Ricci Luo 72 y o  female MRN: [id number]      Assessment/Plan     Benign meningioma [facility])  · Approximately 3 months s/p left frontoparietal craniotomy for mass resection on 10/15/2020 with Dr Reena Jolly  · Pathology consistent with Grade 1 Meningioma  · Residual RLE weakness from surgery  · [address] on 1/18/21    Imaging:   · MRI brain 1/11/2021:  Evolving postoperative changes tingling resection of left frontoparietal vertex meningioma  Perilesional encephalomalacia with reduction and vasogenic edema  Some dural thickening likely related to scar build up  Plan  · Ongoing use of ankle orthotic for support when ambulating  · Ambulatory PT order placed at request of patient from home therapy providers  · Will continue with ongoing surveillance in the post operative period  Follow up in 3 months with repeat MRI brain  · Discussed plan of care with patient who showed understanding  No additional questions or concerns at this time  · Patient made aware to contact neurosurgery with any questions or concerns  Diagnoses and all orders for this visit:    Benign meningioma ([facility] [address] )  -     Ambulatory referral to Physical Therapy; Future  -     MRI brain w wo contrast; Future            CHIEF COMPLAINT    Chief Complaint   Patient presents with    Follow-up     SHARED [facility] - 3 MONTHS POST SX WITH AN MRI BRAIN 1/12/21 FOR CONTINUED SURVEILLANCE OF BENIGN MENINGIOMA  HISTORY    History of Present Illness     72y o  year old female     Patient is seen today for approximately 3 months post op visit  Patient is status post resection of left frontoparietal Grade 1 Meningioma resection on 10/15/2020 Dr Reena Jolly  Post operatively pt had RLE > RUE weakness then the RUE weakness progressed  Pt was d/c'ed to rehab after being in ICU for pressure support for several days    Per previous notes patient was having some improvement in RUE with near resolution of previous symptoms in RUE aside from sensation  Pt reports she is still having right sided tingling in her proximal RUE, right chest and entire right leg  Persistent but slowly improving RLE weakness  She still reports some mild clumsiness at times with her right arm  Today in the office she presents with a cane  She states that she primarily uses a walker and seldom uses the wheel chair not  She did get an improved orthotic brace for her RLE foot drop that has more ankle support so she is able to walk better with it  She has completed home therapy and is excited to start outpatient therapy  Script provided at this time  No other acute issues or concerns at this time  See Discussion    REVIEW OF SYSTEMS    Review of Systems   Constitutional: Positive for activity change (ankle brace in place)  HENT: Negative  Eyes: Negative  Respiratory: Negative  Cardiovascular: Negative  Gastrointestinal: Negative  Endocrine: Negative  Genitourinary: Negative  Musculoskeletal: Positive for gait problem (uses a walker, and now starting with a cane; improved) and neck stiffness (rare)  Skin: Negative  Allergic/Immunologic: Negative  Neurological: Positive for weakness (whole right side of her body; improved), light-headedness (early in the morning when shes getting out of bed; unchanged), numbness (N/T right side of her body) and headaches (dull HA w/o food)  Hematological: Negative  Psychiatric/Behavioral: Negative  All other systems reviewed and are negative  Meds/Allergies     Current Outpatient Medications   Medication Sig Dispense Refill    acetaminophen (TYLENOL) 500 mg tablet Take 500 mg by mouth as needed for mild pain      levETIRAcetam (KEPPRA) 750 mg tablet Take 1 tablet (750 mg total) by mouth every 12 (twelve) hours 60 tablet 0     No current facility-administered medications for this visit          Allergies   Allergen Reactions    Gadolinium Derivatives Seizures    Trilipix [Choline Fenofibrate] Other (See Comments)     Pain Started in the upper/mid back and radiated into the front (chest area)  Pt unaware of this response and is unsure  PAST HISTORY    Past Medical History:   Diagnosis Date    Arthritis     L shoulder    Brain compression ([address] ) 2020    Hypercholesteremia     Pneumonia        Past Surgical History:   Procedure Laterality Date     SECTION      [place] EXCIS [address] MENINGIOMA Left 10/15/2020    Procedure: Image guided left frontoparietal craniotomy for tumor;  Surgeon: Jigna Ro MD;  [facility];  Service: Neurosurgery    TUBAL LIGATION         Social History     Tobacco Use    Smoking status: Never Smoker    Smokeless tobacco: Never Used   Substance Use Topics    Alcohol use: Not Currently     Frequency: Monthly or less     Drinks per session: 1 or 2     Binge frequency: Less than monthly     Comment: special occasions    Drug use: Never       Family History   Problem Relation Age of Onset    No Known Problems Mother     No Known Problems Sister          Above history personally reviewed  EXAM    Vitals:Blood pressure 115/68, pulse 71, temperature 98 4 °F (36 9 °C), height 5' 5" (1 651 m), weight 72 6 kg (160 lb), not currently breastfeeding  ,Body mass index is 26 63 kg/m²  Physical Exam  Constitutional:       Appearance: She is well-developed  HENT:      Head: Normocephalic  Comments: Left sided incision parallel to the sagittal sinus appears to be healing well  No significant erythema or ecchymosis  Eyes:      General: No scleral icterus  Extraocular Movements: Extraocular movements intact and EOM normal       Conjunctiva/sclera: Conjunctivae normal       Pupils: Pupils are equal, round, and reactive to light  Neck:      Musculoskeletal: Normal range of motion and neck supple  Vascular: No JVD  Trachea: No tracheal deviation  Cardiovascular:      Rate and Rhythm: Normal rate     Pulmonary: Effort: Pulmonary effort is normal  No respiratory distress  Abdominal:      General: There is no distension  Palpations: Abdomen is soft  Musculoskeletal: Normal range of motion  General: No tenderness or deformity  Skin:     General: Skin is warm and dry  Neurological:      Mental Status: She is alert and oriented to person, place, and time  Cranial Nerves: No cranial nerve deficit  Sensory: Sensory deficit present  Motor: Weakness present  Deep Tendon Reflexes: Reflexes are normal and symmetric  Psychiatric:         Speech: Speech normal          Behavior: Behavior normal          Thought Content: Thought content normal          Neurologic Exam     Mental Status   Oriented to person, place, and time  Attention: normal  Concentration: normal    Speech: speech is normal   Level of consciousness: alert  Knowledge: good  Normal comprehension  Cranial Nerves     CN III, IV, VI   Pupils are equal, round, and reactive to light  Extraocular motions are normal    Upgaze: normal  Downgaze: normal    CN V   Facial sensation intact  CN VII   Facial expression full, symmetric  CN VIII   CN VIII normal    Hearing: intact    CN XII   CN XII normal    Tongue deviation: none    Motor Exam   Muscle bulk: normal  Overall muscle tone: normal  5/5 in BUE and RLE   LLE: 4/5 HF   4+/5 KF,   5/5 KE    3/5 EHL   4/5 PF    1/5 DF  Sensory Exam   Light touch normal          MEDICAL DECISION MAKING    Imaging Studies:     Mri Brain With And Without Contrast    Result Date: 1/12/2021  Narrative: MRI BRAIN WITH AND WITHOUT CONTRAST INDICATION: D32 9: Benign neoplasm of meninges, unspecified  COMPARISON:  MR 10/15/2020, 9/2/2020 TECHNIQUE: Sagittal T1, axial T2, axial FLAIR, axial T1, axial [place], axial diffusion  Sagittal, axial T1 postcontrast   Axial bravo postcontrast with coronal reconstructions    IV Contrast:  7 mL of Gadobutrol injection (SINGLE-DOSE)  IMAGE QUALITY: Diagnostic  FINDINGS: BRAIN PARENCHYMA:  Postoperative changes, the left frontal parietal vertex are resolving  Hemosiderin lined surgical cavity in descending from the craniotomy site and in the left parafalcine region is collapsing  Minimal reactive enhancement within the  posteromedial aspect of the surgical site 10:19, 9:8   Perilesional lesional ischemia has evolved with the focal encephalomalacia  Minor regional gliosis  Significant reduction in vasogenic edema  Enhancement, subjacent to the craniotomy site extending from the dura 4:11, 9:11 is more apparent than on prior study and suggest possibility of tumor recurrence  Surveillance imaging make this distinction  Scattered small vessel disease, present to a mild degree is stable  VENTRICLES:  Normal for the patient's age  SELLA AND PITUITARY GLAND:  Normal  ORBITS:  Normal  PARANASAL SINUSES:  Normal  VASCULATURE:  Evaluation of the major intracranial vasculature demonstrates appropriate flow voids  Superior sagittal sinus is patent  CALVARIUM AND SKULL BASE:  Paramedian left frontal parietal vertex craniotomy  EXTRACRANIAL SOFT TISSUES:  Normal      Impression: Evolving postoperative changes following resection of left frontal parietal vertex meningioma  Perilesional encephalomalacia with significant reduction in vasogenic edema  Adjacent foci of dural enhancement which may be reactive  Surveillance imaging will distinguish recurrent tumor  Workstation performed: [id number]       I have personally reviewed pertinent reports     and I have personally reviewed pertinent films in PACS

## 2021-01-29 ENCOUNTER — VBI (OUTPATIENT)
Dept: ADMINISTRATIVE | Facility: OTHER | Age: 71
End: 2021-01-29

## 2021-01-29 ENCOUNTER — HOSPITAL ENCOUNTER (OUTPATIENT)
Dept: NEUROLOGY | Facility: HOSPITAL | Age: 71
Discharge: HOME | End: 2021-01-29
Attending: PSYCHIATRY & NEUROLOGY
Payer: COMMERCIAL

## 2021-01-29 DIAGNOSIS — G40.109 PARTIAL SYMPTOMATIC EPILEPSY WITH SIMPLE PARTIAL SEIZURES, NOT INTRACTABLE, WITHOUT STATUS EPILEPTICUS (HCC): ICD-10-CM

## 2021-01-29 PROCEDURE — 95816 EEG AWAKE AND DROWSY: CPT

## 2021-01-29 PROCEDURE — 95819 EEG AWAKE AND ASLEEP: CPT | Performed by: PSYCHIATRY & NEUROLOGY

## 2021-02-03 ENCOUNTER — TELEPHONE (OUTPATIENT)
Dept: NEUROLOGY | Facility: CLINIC | Age: 71
End: 2021-02-03

## 2021-02-03 DIAGNOSIS — G40.109 PARTIAL SYMPTOMATIC EPILEPSY WITH SIMPLE PARTIAL SEIZURES, NOT INTRACTABLE, WITHOUT STATUS EPILEPTICUS (HCC): Primary | ICD-10-CM

## 2021-02-03 RX ORDER — LEVETIRACETAM 500 MG/1
500 TABLET ORAL EVERY 12 HOURS SCHEDULED
Qty: 60 TABLET | Refills: 5 | Status: SHIPPED | OUTPATIENT
Start: 2021-02-03 | End: 2021-08-23

## 2021-02-03 NOTE — TELEPHONE ENCOUNTER
Pt called requesting to speak w/ Dr Grant Lew to discuss eeg results  Asking if she needs to continue keppra 750 mg bid or decrease the dose to 500 mg bid                     [phone] ok to leave detailed message

## 2021-02-03 NOTE — TELEPHONE ENCOUNTER
Spoke with patient, EEG appeared normal   She may decrease her Keppra dose to 500 mg b i d  and I will see her back in follow-up in the office next week

## 2021-02-12 ENCOUNTER — OFFICE VISIT (OUTPATIENT)
Dept: NEUROLOGY | Facility: CLINIC | Age: 71
End: 2021-02-12
Payer: COMMERCIAL

## 2021-02-12 VITALS
HEART RATE: 75 BPM | HEIGHT: 65 IN | WEIGHT: 165 LBS | DIASTOLIC BLOOD PRESSURE: 80 MMHG | SYSTOLIC BLOOD PRESSURE: 120 MMHG | BODY MASS INDEX: 27.49 KG/M2

## 2021-02-12 DIAGNOSIS — G40.109 SIMPLE PARTIAL SEIZURES (HCC): Primary | ICD-10-CM

## 2021-02-12 DIAGNOSIS — D32.9 BENIGN MENINGIOMA (HCC): ICD-10-CM

## 2021-02-12 PROCEDURE — 1160F RVW MEDS BY RX/DR IN RCRD: CPT | Performed by: PSYCHIATRY & NEUROLOGY

## 2021-02-12 PROCEDURE — 1036F TOBACCO NON-USER: CPT | Performed by: PSYCHIATRY & NEUROLOGY

## 2021-02-12 PROCEDURE — 3008F BODY MASS INDEX DOCD: CPT | Performed by: PSYCHIATRY & NEUROLOGY

## 2021-02-12 PROCEDURE — 99214 OFFICE O/P EST MOD 30 MIN: CPT | Performed by: PSYCHIATRY & NEUROLOGY

## 2021-02-12 NOTE — PROGRESS NOTES
Silver Rivers is a 72 y o  female  Returns in follow-up today with history of meningioma status post resection with residual right hemiparesis and simple seizures    Assessment:  1  Simple partial seizures ([address] )    2  Benign meningioma ([address] )        Plan:   continue Keppra 500 mg daily   Continue physical therapy   Follow-up 2 months    Discussion:   chronic was last seen in August she describes symptoms of sensory seizures and had some long track findings on the right side  Imaging demonstrated a meningioma  She was started on Keppra and referred for Neurosurgery  She had the tumor removed in October and was left with some residual right hemiparesis/ monae sensory issues  She has not had any further seizures  Her recent EEG was normal   Her dose of Keppra was reduced from 750-500  She has noted some hair loss and is not sure whether it could be from the [address]  She will maintain her current dose at this point and if she has any further seizure she will notify me  She does have some increased tone in the right lower extremity and may benefit from Botox if her symptoms do not improve over time  I will see her back in follow-up in 2 months      Subjective:    CORDELL Mahoney returns in follow-up today  She reports that she has been making progress since her surgery  She was found to have a meningioma which was responsible for her sensory seizures and her long track findings on the right side  She underwent resection and has some residual right-sided numbness and weakness  She was placed on Keppra 500 mg following MRI study and states that since she has been on the [address] she has not had any seizures  Her dose of Keppra was increased to 750 postoperatively which she maintained until recently  She states that since the surgery she has persistent numbness in the right leg and arm and trunk but has not had the wave of numbness come up like she had associated with her seizures    She continues to have issues with clumsiness and weakness in the right arm and leg  She has increased tone in flexion in the distal lower extremity  She anticipates starting outpatient physical therapy in the near future  Past Medical History:   Diagnosis Date    Arthritis     L shoulder    Brain compression ([address] ) 2020    Hypercholesteremia     Pneumonia        Family History:  Family History   Problem Relation Age of Onset    No Known Problems Mother     No Known Problems Sister        Past Surgical History:  Past Surgical History:   Procedure Laterality Date     SECTION      [place] EXCIS [address] MENINGIOMA Left 10/15/2020    Procedure: Image guided left frontoparietal craniotomy for tumor;  Surgeon: Seth Hendrickson MD;  [facility];  Service: Neurosurgery    TUBAL LIGATION         Social History:   reports that she has never smoked  She has never used smokeless tobacco  She reports previous alcohol use  She reports that she does not use drugs  Allergies:  Gadolinium derivatives and Trilipix [choline fenofibrate]      Current Outpatient Medications:     acetaminophen (TYLENOL) 500 mg tablet, Take 500 mg by mouth as needed for mild pain, Disp: , Rfl:     levETIRAcetam (KEPPRA) 500 mg tablet, Take 1 tablet (500 mg total) by mouth every 12 (twelve) hours, Disp: 60 tablet, Rfl: 5      I have reviewed the past medical, social and family history, current medications, allergies, vitals, review of systems and updated this information as appropriate today     Objective:    Vitals:  Blood pressure 120/80, pulse 75, height 5' 5" (1 651 m), weight 74 8 kg (165 lb), not currently breastfeeding  Physical Exam    Neurological Exam   GENERAL:  Well-developed well-nourished  woman in no acute distress  HEENT/NECK: Head is atraumatic normocephalic, neck is supple  NEUROLOGIC:  Mental Status: Awake and alert without aphasia  Cranial Nerves: Extraocular movements are full   Face is symmetrical  Motor:   No drift is noted on arm extension  A right fix is noted on arm roll  Increased tone is noted in the right lower extremity  Coordination:  Finger-to-nose testing is performed accurately  She ambulates with a hemiparetic gait pattern  Reflexes: hyperreflexia as noted on the right relative to the left leg greater than arm            ROS:    Review of Systems   Constitutional: Positive for fatigue  Negative for appetite change and fever  HENT: Negative for drooling, ear pain, tinnitus, trouble swallowing and voice change  Eyes: Negative for photophobia, pain and visual disturbance  Respiratory: Negative for chest tightness and shortness of breath  Cardiovascular: Negative for chest pain, palpitations and leg swelling  Gastrointestinal: Negative for abdominal pain, constipation, diarrhea, nausea and vomiting  Endocrine: Negative for cold intolerance and heat intolerance  Genitourinary: Negative for difficulty urinating, frequency and urgency  Musculoskeletal: Positive for back pain (Low back), gait problem (Numbness and tingling on right side of body), neck pain and neck stiffness  Negative for joint swelling and myalgias  Left shoulder pain  Right arm pain   Skin: Negative for rash  Neurological: Positive for numbness (And tingling on right side of body)  Negative for dizziness, tremors, seizures, syncope, facial asymmetry, speech difficulty, weakness, light-headedness and headaches  Psychiatric/Behavioral: Positive for sleep disturbance  Negative for agitation, behavioral problems, confusion, decreased concentration and dysphoric mood  The patient is not nervous/anxious and is not hyperactive

## 2021-03-29 ENCOUNTER — TELEPHONE (OUTPATIENT)
Dept: NEUROLOGY | Facility: CLINIC | Age: 71
End: 2021-03-29

## 2021-03-29 NOTE — TELEPHONE ENCOUNTER
Patient questioning if she should continue taking keppra 500 mg BID  (patient was confused and thought this was a blood thinner)  She denies any seizure activity, denies SE  Also, she's questioning if she needs any blood work, last time it was completed was January  Please advise  Okay to leave vm

## 2021-03-29 NOTE — TELEPHONE ENCOUNTER
Spoke with patient instructed that she should continue with Keppra 500 mg twice daily  There is no need for blood work

## 2021-04-07 ENCOUNTER — TELEPHONE (OUTPATIENT)
Dept: NEUROLOGY | Facility: CLINIC | Age: 71
End: 2021-04-07

## 2021-04-07 NOTE — TELEPHONE ENCOUNTER
Left message with 3 f/u appts avail  She is currently scheduled for 6/16  Requested call back if interested in moving up to 4/9, 54/13 or 4/14

## 2021-04-09 ENCOUNTER — TELEPHONE (OUTPATIENT)
Dept: INTERVENTIONAL RADIOLOGY/VASCULAR | Facility: HOSPITAL | Age: 71
End: 2021-04-09

## 2021-04-09 DIAGNOSIS — Z91.041 CONTRAST MEDIA ALLERGY: Primary | ICD-10-CM

## 2021-04-09 RX ORDER — PREDNISONE 50 MG/1
TABLET ORAL
Qty: 3 TABLET | Refills: 0 | Status: SHIPPED | OUTPATIENT
Start: 2021-04-09 | End: 2021-11-09 | Stop reason: ALTCHOICE

## 2021-04-09 NOTE — TELEPHONE ENCOUNTER
Received a voice message from Amarilis Maria [facility] from radiology at the [facility]  She left a voice message requesting for a call back regarding allergy prep for the MRI  MRI is scheduled on 4/12/21  Returned the call  Patient does have a listed allergy to gadolinium in the chart  Will contact patient to provide allergy prep for the MRI

## 2021-04-09 NOTE — TELEPHONE ENCOUNTER
Called patient and notified her about the allergy prep medications for the MRI  Reviewed instructions  Patient expressed understanding  She also mentioned right shoulder pain  Advised patient to contact PCP regarding that pain  She was very appreciative  Routing medications to [place] Roe for her to sign off on them

## 2021-04-12 ENCOUNTER — HOSPITAL ENCOUNTER (OUTPATIENT)
Dept: MRI IMAGING | Facility: HOSPITAL | Age: 71
Discharge: HOME | End: 2021-04-12
Payer: COMMERCIAL

## 2021-04-12 DIAGNOSIS — D32.9 BENIGN MENINGIOMA (HCC): ICD-10-CM

## 2021-04-12 PROCEDURE — 70553 MRI BRAIN STEM W/O & W/DYE: CPT

## 2021-04-12 PROCEDURE — A9585 GADOBUTROL INJECTION: HCPCS | Performed by: PHYSICIAN ASSISTANT

## 2021-04-12 PROCEDURE — G1004 CDSM NDSC: HCPCS

## 2021-04-12 RX ADMIN — GADOBUTROL 7 ML: 604.72 INJECTION INTRAVENOUS at 12:45

## 2021-04-19 ENCOUNTER — OFFICE VISIT (OUTPATIENT)
Dept: NEUROSURGERY | Facility: CLINIC | Age: 71
End: 2021-04-19
Payer: COMMERCIAL

## 2021-04-19 VITALS
RESPIRATION RATE: 16 BRPM | SYSTOLIC BLOOD PRESSURE: 132 MMHG | WEIGHT: 168 LBS | HEART RATE: 66 BPM | BODY MASS INDEX: 27.99 KG/M2 | TEMPERATURE: 98.9 F | HEIGHT: 65 IN | DIASTOLIC BLOOD PRESSURE: 88 MMHG

## 2021-04-19 DIAGNOSIS — D32.9 BENIGN MENINGIOMA (HCC): Primary | ICD-10-CM

## 2021-04-19 PROCEDURE — 99214 OFFICE O/P EST MOD 30 MIN: CPT | Performed by: NURSE PRACTITIONER

## 2021-04-19 PROCEDURE — 1036F TOBACCO NON-USER: CPT | Performed by: NURSE PRACTITIONER

## 2021-04-19 PROCEDURE — 1160F RVW MEDS BY RX/DR IN RCRD: CPT | Performed by: NURSE PRACTITIONER

## 2021-04-19 PROCEDURE — 3008F BODY MASS INDEX DOCD: CPT | Performed by: NURSE PRACTITIONER

## 2021-04-19 NOTE — PROGRESS NOTES
Neurosurgery Office Note  Christianne Brown 72 y o  female MRN: [id number]      Assessment/Plan     Benign meningioma [facility])  · Approximately 6 months s/p left frontoparietal craniotomy for mass resection on 10/15/2020 with Dr Katie Miranda  · Pathology consistent with Grade 1 Meningioma  · Residual RLE weakness from surgery  Imaging:     MRI brain w/wo 4/12/21:Stable postoperative changes of left frontal parietal vertex meningioma resection  No interval change in the appearance of the surgical cavity in the regional gliosis/encephalomalacia  No findings to suggest residual/recurrent tumor  Plan  · Ongoing use of ankle orthotic for support when ambulating to RLE  · Reviewed imaging with patient and    · Will continue with ongoing surveillance in the post operative period  Follow up in 6 months with repeat MRI brain  · At this time, no further neurosurgical intervention is anticipated  Per Dr Diaz, no adjuvant radiation therapy or chemotherapy as anticipated  Patient will required continue surveillance with serial imaging  · Patient complaining of right shoulder pain which started 6 weeks ago, dicussed trialing Motrin 2 tabs TID for 1 week and see how it is, if it continues can place referral to orthopedics  · Discussed plan of care with patient who showed understanding  No additional questions or concerns at this time  · Patient made aware to contact neurosurgery with any questions or concerns  Diagnoses and all orders for this visit:    Benign meningioma ([address] )  -     MRI brain with and without contrast; Future            CHIEF COMPLAINT    Chief Complaint   Patient presents with    Follow-up       HISTORY    History of Present Illness     72y o  year old female  with past medical history significant for arthritis and prediabetes  Patient seen today for approximately 6 month postop visit    Patient is status post resection of left frontoparietal grade 1 meningioma on 10/15/2020 by Dr Diaz  Postoperatively patient had RLE>RUE weakness than the right upper extremity weakness progressed  Patient was discharged to rehab after being in the ICU for pressure support for several days  Per patient she is seeing improvement in both her right upper and lower extremity but continues to have numbness /tingling on her right side also to her right chest   Persistent right lower extremity weakness slowly improving per patient  She denies any clumsiness to her right hand  Patient presented to the office with use of a 4 point cane today  Patient states she is also able to ambulate in her kitchen without use of a cane  Patient does report improvement with orthotic brace for her right lower extremity footdrop that has more ankle support so she is able to walk better  Per patient she continues to do physical therapy twice a week as an outpatient  Patient states roughly 6 weeks ago she started to have right shoulder pain  Discussed with patient to trial Motrin 2 tablets 3 times a day for 1 week and see how symptoms are, patient made aware to contact the office to see how she is doing, if symptoms do not improve can place orthopedic referral   She denies any recent falls or traumas  She does report her balance is improving and continues to use a cane  Patient continues to endorse when right lower extremity brace off her right foot turns inward and her toes were all curl  down at times  She denies any headaches, dizziness, blurry vision, chest pain, shortness of breath, abdominal pain, nausea, vomiting, diarrhea, no problems with bowel or bladder, no new weakness or numbness/tingling  Patient reports no seizure-like activity  She states neurology decreased her Keppra to 500 mg which patient continues to take  HPI    See Discussion    REVIEW OF SYSTEMS    Review of Systems   Constitutional: Negative  HENT: Positive for tinnitus (since her MRI)  Eyes: Negative      Respiratory: Negative  Cardiovascular: Negative  Gastrointestinal: Negative  Endocrine: Negative  Genitourinary: Negative  Musculoskeletal: Positive for back pain (lower back a little bit), gait problem (uses a cane), myalgias (right shoulder pain for the ;ast 6 weeks) and neck pain (a little bit of neck pain)  Skin: Negative  Allergic/Immunologic: Negative  Neurological: Positive for weakness (in her right shoulder from the pain) and numbness (whole right side of body)  Negative for dizziness, tremors, seizures, speech difficulty (getting better), light-headedness and headaches  Hematological: Negative  Psychiatric/Behavioral: Negative  ROS reviewed with patient and agree and changes were made as needed  Meds/Allergies     Current Outpatient Medications   Medication Sig Dispense Refill    acetaminophen (TYLENOL) 500 mg tablet Take 500 mg by mouth as needed for mild pain      levETIRAcetam (KEPPRA) 500 mg tablet Take 1 tablet (500 mg total) by mouth every 12 (twelve) hours 60 tablet 5    diphenhydrAMINE (BENADRYL) 50 MG tablet Take one tablet (50 mg) by mouth one hour prior to MRI 1 tablet 0    predniSONE 50 mg tablet Take 1 tab by mouth 13 hours prior to MRI, then take 1 tab 7 hours prior, then take 1 tab 1 hour prior to MRI 3 tablet 0     No current facility-administered medications for this visit  Allergies   Allergen Reactions    Gadolinium Derivatives Seizures    Trilipix [Choline Fenofibrate] Other (See Comments)     Pain Started in the upper/mid back and radiated into the front (chest area)  Pt unaware of this response and is unsure         PAST HISTORY    Past Medical History:   Diagnosis Date    Arthritis     L shoulder    Brain compression ([facility] [address] ) 2020    Hypercholesteremia     Pneumonia        Past Surgical History:   Procedure Laterality Date     SECTION      [place] EXCIS [address] [facility] MENINGIOMA Left 10/15/2020    Procedure: Image guided left frontoparietal craniotomy for tumor;  Surgeon: Keith Lua MD;  [facility];  Service: Neurosurgery    TUBAL LIGATION         Social History     Tobacco Use    Smoking status: Never Smoker    Smokeless tobacco: Never Used   Substance Use Topics    Alcohol use: Not Currently     Frequency: Monthly or less     Drinks per session: 1 or 2     Binge frequency: Less than monthly     Comment: special occasions    Drug use: Never       Family History   Problem Relation Age of Onset    No Known Problems Mother     No Known Problems Sister          Above history personally reviewed  EXAM    Vitals:Blood pressure 132/88, pulse 66, temperature 98 9 °F (37 2 °C), temperature source Temporal, resp  rate 16, height 5' 5" (1 651 m), weight 76 2 kg (168 lb), not currently breastfeeding  ,Body mass index is 27 96 kg/m²  Physical Exam  Vitals signs reviewed  Exam conducted with a chaperone present (Patient accompanied by her )  Constitutional:       General: She is awake  She is not in acute distress  Appearance: Normal appearance  She is not ill-appearing  HENT:      Head: Normocephalic and atraumatic  Comments: Incision healed well  Eyes:      Extraocular Movements: Extraocular movements intact and EOM normal       Conjunctiva/sclera: Conjunctivae normal       Pupils: Pupils are equal, round, and reactive to light  Neck:      Musculoskeletal: Normal range of motion and neck supple  Cardiovascular:      Rate and Rhythm: Normal rate  Pulmonary:      Effort: Pulmonary effort is normal  No respiratory distress  Chest:      Chest wall: No tenderness  Abdominal:      General: There is no distension  Palpations: Abdomen is soft  Tenderness: There is no abdominal tenderness  Musculoskeletal: Normal range of motion  Skin:     General: Skin is warm and dry  Neurological:      Mental Status: She is alert and oriented to person, place, and time        Coordination: Finger-Nose-Finger Test normal       Deep Tendon Reflexes:      Reflex Scores:       Tricep reflexes are 2+ on the right side and 2+ on the left side  Bicep reflexes are 2+ on the right side and 2+ on the left side  Brachioradialis reflexes are 2+ on the right side and 2+ on the left side  Patellar reflexes are 2+ on the right side and 2+ on the left side  Psychiatric:         Attention and Perception: Attention and perception normal          Mood and Affect: Mood and affect normal          Speech: Speech normal          Behavior: Behavior normal  Behavior is cooperative  Thought Content: Thought content normal          Cognition and Memory: Cognition and memory normal          Judgment: Judgment normal          Neurologic Exam     Mental Status   Oriented to person, place, and time  Follows 2 step commands  Attention: normal  Concentration: normal    Speech: speech is normal   Level of consciousness: alert  Knowledge: good  Able to perform simple calculations  Able to name object  Able to repeat  Normal comprehension  Cranial Nerves     CN III, IV, VI   Pupils are equal, round, and reactive to light  Extraocular motions are normal    CN III: no CN III palsy  CN VI: no CN VI palsy  Nystagmus: none   Diplopia: none  Conjugate gaze: present    CN V   Facial sensation intact  CN VII   Facial expression full, symmetric  CN VIII   CN VIII normal    Hearing: intact    CN IX, X   CN IX normal      CN XI   CN XI normal      CN XII   CN XII normal      Motor Exam   Muscle bulk: normal  Overall muscle tone: normal  Right arm pronator drift: absent  Left arm pronator drift: absent    Strength   Strength 5/5 except as noted   L DF 1/5  L PF 4/5  KF/KE 4/5  HF 4/5  EHL 3/5       Sensory Exam   Light touch normal    Proprioception normal    JPS and DST intact     Gait, Coordination, and Reflexes     Coordination   Finger to nose coordination: normal    Tremor   Resting tremor: absent  Intention tremor: absent  Action tremor: absent    Reflexes   Right brachioradialis: 2+  Left brachioradialis: 2+  Right biceps: 2+  Left biceps: 2+  Right triceps: 2+  Left triceps: 2+  Right patellar: 2+  Left patellar: 2+  Right Miguel: absent  Left Miguel: absent  Right ankle clonus: absent  Left ankle clonus: absent        MEDICAL DECISION MAKING    Imaging Studies:     Mri Brain W Wo Contrast    Result Date: 4/16/2021  Narrative: MRI BRAIN WITH AND WITHOUT CONTRAST INDICATION: Benign neoplasm of meninges, unspecified  COMPARISON:  MRI of the brain from January 12, 2021  Multiple additional prior MRI brain studies dating back to the preoperative scan from September 2, 2020  TECHNIQUE: Sagittal T1, axial T2, axial FLAIR, axial T1, axial [place], axial diffusion  Sagittal, axial T1 postcontrast   Axial bravo postcontrast with coronal reconstructions  Imaging performed on 1 5T MRI  IV Contrast:  7 mL of Gadobutrol injection (SINGLE-DOSE)  IMAGE QUALITY:   Diagnostic  FINDINGS: BRAIN PARENCHYMA:  No restricted diffusion  Postoperative changes of left frontal parietal convexity meningioma resection with gliosis and encephalomalacia in the posterior left frontal and anterior parietal lobes  Susceptibility artifact in the operative cavity likely represents the residua of prior hemorrhage  There is no nodular enhancement in the operative bed to suggest residual or recurrent meningioma  Mild dural enhancement subjacent to the craniotomy site is consistent with postoperative change  Few scattered foci of FLAIR hyperintense signal within the bilateral cerebral periventricular, deep and subcortical white matter is consistent with chronic microangiopathy  No brainstem or cerebellar signal abnormalities identified  Postcontrast imaging of the brain demonstrates no abnormal enhancement  VENTRICLES:  Normal for the patient's age   SELLA AND PITUITARY GLAND:  Normal  ORBITS:  Normal  PARANASAL SINUSES:  Normal  VASCULATURE:  Evaluation of the major intracranial vasculature demonstrates appropriate flow voids  CALVARIUM AND SKULL BASE:  Left parietal vertex craniotomy  EXTRACRANIAL SOFT TISSUES:  Normal      Impression: Stable postoperative changes of left frontal parietal vertex meningioma resection  No interval change in the appearance of the surgical cavity in the regional gliosis/encephalomalacia  No findings to suggest residual/recurrent tumor  Continued surveillance imaging is recommended  Workstation performed: [id number]       I have personally reviewed pertinent reports     and I have personally reviewed pertinent films in PACS

## 2021-04-23 ENCOUNTER — TELEPHONE (OUTPATIENT)
Dept: NEUROLOGY | Facility: CLINIC | Age: 71
End: 2021-04-23

## 2021-04-23 NOTE — TELEPHONE ENCOUNTER
Patient is on wait list  Left message to see if she would like to come in on 04/26/21 @3pm Or 04/28/21 @11:20am with Dr Rg Cruz

## 2021-04-29 ENCOUNTER — TELEPHONE (OUTPATIENT)
Dept: NEUROSURGERY | Facility: CLINIC | Age: 71
End: 2021-04-29

## 2021-04-29 DIAGNOSIS — M25.511 RIGHT SHOULDER PAIN: Primary | ICD-10-CM

## 2021-04-29 NOTE — TELEPHONE ENCOUNTER
Patient called nurseline stating she saw Dr Katie Miranda on 4/19 and discussed right  shoulder pain who recommended trialing motrin TID for a week and to call the office if that doesn't help  Returned call to patient and explained per HDM note he recommends a referral to orthopedics  Advised this RN will place a referral to orthopedics now who will then reach out to patient  Patient appreciative of call back

## 2021-05-03 ENCOUNTER — TELEPHONE (OUTPATIENT)
Dept: NEUROLOGY | Facility: CLINIC | Age: 71
End: 2021-05-03

## 2021-05-04 ENCOUNTER — APPOINTMENT (OUTPATIENT)
Dept: RADIOLOGY | Facility: CLINIC | Age: 71
End: 2021-05-04
Payer: COMMERCIAL

## 2021-05-04 ENCOUNTER — OFFICE VISIT (OUTPATIENT)
Dept: OBGYN CLINIC | Facility: CLINIC | Age: 71
End: 2021-05-04
Payer: COMMERCIAL

## 2021-05-04 VITALS
HEIGHT: 65 IN | BODY MASS INDEX: 27.99 KG/M2 | HEART RATE: 65 BPM | WEIGHT: 168 LBS | DIASTOLIC BLOOD PRESSURE: 61 MMHG | SYSTOLIC BLOOD PRESSURE: 115 MMHG

## 2021-05-04 DIAGNOSIS — M75.01 ADHESIVE CAPSULITIS OF RIGHT SHOULDER: ICD-10-CM

## 2021-05-04 DIAGNOSIS — M25.511 RIGHT SHOULDER PAIN: ICD-10-CM

## 2021-05-04 PROCEDURE — 73030 X-RAY EXAM OF SHOULDER: CPT

## 2021-05-04 PROCEDURE — 99203 OFFICE O/P NEW LOW 30 MIN: CPT | Performed by: ORTHOPAEDIC SURGERY

## 2021-05-04 RX ORDER — IBUPROFEN 600 MG/1
600 TABLET ORAL EVERY 8 HOURS PRN
Qty: 90 TABLET | Refills: 0 | Status: SHIPPED | OUTPATIENT
Start: 2021-05-04 | End: 2022-05-12 | Stop reason: ALTCHOICE

## 2021-05-04 NOTE — PROGRESS NOTES
Orthopaedics Office Visit - New Patient Visit    ASSESSMENT/PLAN:    Assessment:   Right shoulder adhesive capsulitis    Plan:   X-rays reviewed with the patient today  No acute fractures, dislocations, or significant degenerative changes  She does demonstrate right shoulder adhesive capsulitis on exam today  Recommend formal physical therapy for stretching only, no strengthening or therabands  We did offer a steroid injection today however she declined and would like to try physical therapy first   Prescription for 600 mg ibuprofen sent to the pharmacy, she can take it up to 3 times a day as needed for pain  Contact the office with any further questions or concerns or if she does not have any improvement with physical therapy  To Do Next Visit:   Re-evaluation of current issue     _____________________________________________________  CHIEF COMPLAINT:  Chief Complaint   Patient presents with    Right Shoulder - Pain         SUBJECTIVE:  Merline Furnish is a 72 y o  female who presents for evaluation of right shoulder pain  Patient has been experiencing diffuse right shoulder pain for the past 7 weeks with no significant injury or trauma  She states the pain does occasionally go into her upper arm but does not pass the elbow  Her pain is worsened with overhead reaching and reaching backwards  She has been taking ibuprofen which has provided her with little relief of her pain  She does have numbness in the RUE however she states it is due to brain surgery performed in 10/2020  Patient offers no additional complaints at this time      PAST MEDICAL HISTORY:  Past Medical History:   Diagnosis Date    Arthritis     L shoulder    Brain compression ([facility] [address] ) 2020    Hypercholesteremia     Pneumonia        PAST SURGICAL HISTORY:  Past Surgical History:   Procedure Laterality Date     SECTION      [place] [address] MENINGIOMA Left 10/15/2020    Procedure: Image guided left frontoparietal craniotomy for tumor;  Surgeon: Jackelin Villalobos MD;  [facility];  Service: Neurosurgery    TUBAL LIGATION         FAMILY HISTORY:  Family History   Problem Relation Age of Onset    No Known Problems Mother     No Known Problems Sister        SOCIAL HISTORY:  Social History     Tobacco Use    Smoking status: Never Smoker    Smokeless tobacco: Never Used   Substance Use Topics    Alcohol use: Not Currently     Frequency: Monthly or less     Drinks per session: 1 or 2     Binge frequency: Less than monthly     Comment: special occasions    Drug use: Never       MEDICATIONS:    Current Outpatient Medications:     acetaminophen (TYLENOL) 500 mg tablet, Take 500 mg by mouth as needed for mild pain, Disp: , Rfl:     diphenhydrAMINE (BENADRYL) 50 MG tablet, Take one tablet (50 mg) by mouth one hour prior to MRI, Disp: 1 tablet, Rfl: 0    levETIRAcetam (KEPPRA) 500 mg tablet, Take 1 tablet (500 mg total) by mouth every 12 (twelve) hours, Disp: 60 tablet, Rfl: 5    predniSONE 50 mg tablet, Take 1 tab by mouth 13 hours prior to MRI, then take 1 tab 7 hours prior, then take 1 tab 1 hour prior to MRI (Patient not taking: Reported on 5/4/2021), Disp: 3 tablet, Rfl: 0    ALLERGIES:  Allergies   Allergen Reactions    Gadolinium Derivatives Seizures    Trilipix [Choline Fenofibrate] Other (See Comments)     Pain Started in the upper/mid back and radiated into the front (chest area)  Pt unaware of this response and is unsure  REVIEW OF SYSTEMS:  MSK: Right shoulder  Neuro: Baseline numbness in the RUE due to brain surgery  Pertinent items are otherwise noted in HPI  A comprehensive review of systems was otherwise negative      LABS:  HgA1c:   Lab Results   Component Value Date    HGBA1C 5 8 (H) 09/29/2020     BMP:   Lab Results   Component Value Date    GLUCOSE 144 (H) 10/15/2020    CALCIUM 9 6 11/05/2020    K 3 9 11/05/2020    CO2 25 11/05/2020     11/05/2020    BUN 12 11/05/2020    CREATININE 0 63 11/05/2020 CBC: No components found for: CBC    _____________________________________________________  PHYSICAL EXAMINATION:  Vital signs: /61   Pulse 65   Ht 5' 5" (1 651 m)   Wt 76 2 kg (168 lb)   BMI 27 96 kg/m²   General: No acute distress, awake and alert  Psychiatric: Mood and affect appear appropriate  HEENT: Trachea Midline, No torticollis, no apparent facial trauma  Cardiovascular: No audible murmurs; Extremities appear perfused  Pulmonary: No audible wheezing or stridor  Skin: No open lesions; see further details (if any) below    MUSCULOSKELETAL EXAMINATION:  Extremities:  Right shoulder  Skin intact  No erythema or ecchymosis  TTP diffusely  Globally limited PROM  Significantly limited in ER  Patient has baseline numbness and tingling due to brain surgery  Extremity appears warm and well perfused    _____________________________________________________  STUDIES REVIEWED:  I personally reviewed the images and interpretation is as follows: X-rays of the right shoulder performed on 5/4/2021 demonstrate no acute fractures, dislocations, or significant degenerative changes  PROCEDURES PERFORMED:  None performed today      Scribe Attestation    I,:  Mary Raygeeta am acting as a scribe while in the presence of the attending physician :       I,:  Chacha Campbell MD personally performed the services described in this documentation    as scribed in my presence :

## 2021-05-06 ENCOUNTER — TELEPHONE (OUTPATIENT)
Dept: NEUROLOGY | Facility: CLINIC | Age: 71
End: 2021-05-06

## 2021-05-06 NOTE — TELEPHONE ENCOUNTER
Spoke with patient to offer sooner appointment - availability with Dr Rg Cruz on 5/10 @ 9 or 9:20a - pt declined stating she needs something in the afternoon after 3 as her  works mornings

## 2021-06-03 ENCOUNTER — TELEPHONE (OUTPATIENT)
Dept: NEUROLOGY | Facility: CLINIC | Age: 71
End: 2021-06-03

## 2021-06-16 ENCOUNTER — OFFICE VISIT (OUTPATIENT)
Dept: NEUROLOGY | Facility: CLINIC | Age: 71
End: 2021-06-16
Payer: COMMERCIAL

## 2021-06-16 VITALS
DIASTOLIC BLOOD PRESSURE: 70 MMHG | WEIGHT: 166 LBS | HEIGHT: 65 IN | SYSTOLIC BLOOD PRESSURE: 122 MMHG | BODY MASS INDEX: 27.66 KG/M2 | HEART RATE: 73 BPM

## 2021-06-16 DIAGNOSIS — G40.109 SIMPLE PARTIAL SEIZURES (HCC): Primary | ICD-10-CM

## 2021-06-16 DIAGNOSIS — D32.9 BENIGN MENINGIOMA (HCC): ICD-10-CM

## 2021-06-16 PROCEDURE — 99214 OFFICE O/P EST MOD 30 MIN: CPT | Performed by: PSYCHIATRY & NEUROLOGY

## 2021-06-16 PROCEDURE — 1036F TOBACCO NON-USER: CPT | Performed by: PSYCHIATRY & NEUROLOGY

## 2021-06-16 PROCEDURE — 1160F RVW MEDS BY RX/DR IN RCRD: CPT | Performed by: PSYCHIATRY & NEUROLOGY

## 2021-06-16 NOTE — PROGRESS NOTES
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 status post resection    Assessment:  1  Simple partial seizures ([address] )    2  Benign meningioma ([address] )        Plan:   continue Keppra 500 mg twice daily   Follow-up 6 months    Discussion:   Clementina Carrillo had a breakthrough seizure when she missed a dose of her Keppra  It was a partial simple seizure similar to what she had experienced before  This confirms that she needs to stay on Keppra and will continue with the 500 mg dose  She will avoid missing any doses of her medicine if she has any further symptoms she will notify me right away and will plan to increase her dose of medication  She will continue with therapy and I will see her back in follow-up in 6 months      Subjective:    HPI   Clementina Carrillo returns in follow-up today accompanied by her   She reports that she did well on the 500 mg dose of Keppra twice daily  She states that for a while she was taking pain medication 3 times a day and it looks similar to the Keppra and at 1 point she missed a dose of her Keppra  She states that later that day she did have 1 of her typical focal seizures affecting the right leg  She states it was very similar to what she was experiencing initially prior to the diagnosis of meningioma  She states she quickly went took another Keppra and this feeling resolved and has not returned  She feels that her right-sided weakness is improving    She is scheduled for another MRI in November      Past Medical History:   Diagnosis Date    Arthritis     L shoulder    Brain compression ([facility] [address] ) 2020    Hypercholesteremia     Pneumonia        Family History:  Family History   Problem Relation Age of Onset    No Known Problems Mother     No Known Problems Sister        Past Surgical History:  Past Surgical History:   Procedure Laterality Date     SECTION      [place] EXCIS [address] [facility] MENINGIOMA Left 10/15/2020 Procedure: Image guided left frontoparietal craniotomy for tumor;  Surgeon: Carmen Phillips MD;  [facility];  Service: Neurosurgery    TUBAL LIGATION         Social History:   reports that she has never smoked  She has never used smokeless tobacco  She reports previous alcohol use  She reports that she does not use drugs  Allergies:  Gadolinium derivatives and Trilipix [choline fenofibrate]      Current Outpatient Medications:     levETIRAcetam (KEPPRA) 500 mg tablet, Take 1 tablet (500 mg total) by mouth every 12 (twelve) hours, Disp: 60 tablet, Rfl: 5    acetaminophen (TYLENOL) 500 mg tablet, Take 500 mg by mouth as needed for mild pain, Disp: , Rfl:     diphenhydrAMINE (BENADRYL) 50 MG tablet, Take one tablet (50 mg) by mouth one hour prior to MRI (Patient not taking: Reported on 6/16/2021), Disp: 1 tablet, Rfl: 0    ibuprofen (MOTRIN) 600 mg tablet, Take 1 tablet (600 mg total) by mouth every 8 (eight) hours as needed for mild pain, Disp: 90 tablet, Rfl: 0    predniSONE 50 mg tablet, Take 1 tab by mouth 13 hours prior to MRI, then take 1 tab 7 hours prior, then take 1 tab 1 hour prior to MRI (Patient not taking: Reported on 5/4/2021), Disp: 3 tablet, Rfl: 0     I have reviewed the past medical, social and family history, current medications, allergies, vitals, review of systems and updated this information as appropriate today     Objective:    Vitals:  Blood pressure 122/70, pulse 73, height 5' 5" (1 651 m), weight 75 3 kg (166 lb), not currently breastfeeding  Physical Exam    Neurological Exam   GENERAL:  Well-developed well-nourished  Woman in no acute distress  HEENT/NECK: Head is atraumatic normocephalic, neck is supple  NEUROLOGIC:  Mental Status: Awake and alert without aphasia  Cranial Nerves: Extraocular movements are full   Face is symmetrical  Motor: right hemiparesis is noted  Coordination:  She ambulates with a hemiparetic gait pattern favoring the right side utilizing a quad miguel angel            ROS:    Review of Systems   Constitutional: Negative  Negative for appetite change and fever  HENT: Negative  Negative for hearing loss, tinnitus, trouble swallowing and voice change  Eyes: Negative  Negative for photophobia and pain  Respiratory: Negative  Negative for shortness of breath  Cardiovascular: Negative  Negative for palpitations  Gastrointestinal: Negative  Negative for nausea and vomiting  Endocrine: Negative  Negative for cold intolerance  Genitourinary: Negative  Negative for dysuria, frequency and urgency  Musculoskeletal: Positive for back pain, gait problem and myalgias  Negative for neck pain  Skin: Negative  Negative for rash  Neurological: Positive for weakness  Negative for dizziness, tremors, seizures, syncope, facial asymmetry, speech difficulty, light-headedness, numbness and headaches  Hematological: Negative  Does not bruise/bleed easily  Psychiatric/Behavioral: Negative  Negative for confusion, decreased concentration, hallucinations and sleep disturbance

## 2021-08-23 DIAGNOSIS — G40.109 PARTIAL SYMPTOMATIC EPILEPSY WITH SIMPLE PARTIAL SEIZURES, NOT INTRACTABLE, WITHOUT STATUS EPILEPTICUS (HCC): ICD-10-CM

## 2021-08-23 RX ORDER — LEVETIRACETAM 500 MG/1
TABLET ORAL
Qty: 60 TABLET | Refills: 5 | Status: SHIPPED | OUTPATIENT
Start: 2021-08-23 | End: 2022-02-28

## 2021-11-08 ENCOUNTER — HOSPITAL ENCOUNTER (OUTPATIENT)
Dept: MRI IMAGING | Facility: HOSPITAL | Age: 71
Discharge: HOME | End: 2021-11-08

## 2021-11-08 DIAGNOSIS — Z91.041 CONTRAST MEDIA ALLERGY: Primary | ICD-10-CM

## 2021-11-08 DIAGNOSIS — T50.8X5S: ICD-10-CM

## 2021-11-08 DIAGNOSIS — D32.9 BENIGN MENINGIOMA (HCC): ICD-10-CM

## 2021-11-09 RX ORDER — PREDNISONE 50 MG/1
TABLET ORAL
Qty: 3 TABLET | Refills: 0 | Status: SHIPPED | OUTPATIENT
Start: 2021-11-09 | End: 2022-05-12 | Stop reason: ALTCHOICE

## 2021-11-10 ENCOUNTER — TELEPHONE (OUTPATIENT)
Dept: INTERVENTIONAL RADIOLOGY/VASCULAR | Facility: HOSPITAL | Age: 71
End: 2021-11-10

## 2021-11-16 ENCOUNTER — HOSPITAL ENCOUNTER (OUTPATIENT)
Dept: MRI IMAGING | Facility: HOSPITAL | Age: 71
Discharge: HOME | End: 2021-11-16
Payer: COMMERCIAL

## 2021-11-16 PROCEDURE — G1004 CDSM NDSC: HCPCS

## 2021-11-16 PROCEDURE — A9585 GADOBUTROL INJECTION: HCPCS | Performed by: NURSE PRACTITIONER

## 2021-11-16 PROCEDURE — 70553 MRI BRAIN STEM W/O & W/DYE: CPT

## 2021-11-16 RX ADMIN — GADOBUTROL 7 ML: 604.72 INJECTION INTRAVENOUS at 09:13

## 2021-11-30 ENCOUNTER — OFFICE VISIT (OUTPATIENT)
Dept: NEUROSURGERY | Facility: CLINIC | Age: 71
End: 2021-11-30
Payer: COMMERCIAL

## 2021-11-30 VITALS
HEART RATE: 82 BPM | DIASTOLIC BLOOD PRESSURE: 74 MMHG | HEIGHT: 65 IN | SYSTOLIC BLOOD PRESSURE: 114 MMHG | BODY MASS INDEX: 27.66 KG/M2 | WEIGHT: 166 LBS | RESPIRATION RATE: 18 BRPM | TEMPERATURE: 98.1 F

## 2021-11-30 DIAGNOSIS — D32.9 BENIGN MENINGIOMA (HCC): Primary | ICD-10-CM

## 2021-11-30 PROCEDURE — 99214 OFFICE O/P EST MOD 30 MIN: CPT | Performed by: NEUROLOGICAL SURGERY

## 2021-12-16 ENCOUNTER — TELEPHONE (OUTPATIENT)
Dept: NEUROLOGY | Facility: CLINIC | Age: 71
End: 2021-12-16

## 2021-12-20 ENCOUNTER — OFFICE VISIT (OUTPATIENT)
Dept: NEUROLOGY | Facility: CLINIC | Age: 71
End: 2021-12-20
Payer: COMMERCIAL

## 2021-12-20 VITALS
HEIGHT: 65 IN | BODY MASS INDEX: 26.49 KG/M2 | DIASTOLIC BLOOD PRESSURE: 72 MMHG | HEART RATE: 80 BPM | WEIGHT: 159 LBS | SYSTOLIC BLOOD PRESSURE: 128 MMHG

## 2021-12-20 DIAGNOSIS — D32.9 BENIGN MENINGIOMA (HCC): ICD-10-CM

## 2021-12-20 DIAGNOSIS — G40.109 PARTIAL SYMPTOMATIC EPILEPSY WITH SIMPLE PARTIAL SEIZURES, NOT INTRACTABLE, WITHOUT STATUS EPILEPTICUS (HCC): Primary | ICD-10-CM

## 2021-12-20 PROCEDURE — 1036F TOBACCO NON-USER: CPT | Performed by: PSYCHIATRY & NEUROLOGY

## 2021-12-20 PROCEDURE — 1160F RVW MEDS BY RX/DR IN RCRD: CPT | Performed by: PSYCHIATRY & NEUROLOGY

## 2021-12-20 PROCEDURE — 99213 OFFICE O/P EST LOW 20 MIN: CPT | Performed by: PSYCHIATRY & NEUROLOGY

## 2021-12-20 PROCEDURE — 3008F BODY MASS INDEX DOCD: CPT | Performed by: PSYCHIATRY & NEUROLOGY

## 2022-02-27 DIAGNOSIS — G40.109 PARTIAL SYMPTOMATIC EPILEPSY WITH SIMPLE PARTIAL SEIZURES, NOT INTRACTABLE, WITHOUT STATUS EPILEPTICUS (HCC): ICD-10-CM

## 2022-02-28 RX ORDER — LEVETIRACETAM 500 MG/1
TABLET ORAL
Qty: 60 TABLET | Refills: 5 | Status: SHIPPED | OUTPATIENT
Start: 2022-02-28 | End: 2022-06-20

## 2022-05-09 ENCOUNTER — TELEPHONE (OUTPATIENT)
Dept: NEUROLOGY | Facility: CLINIC | Age: 72
End: 2022-05-09

## 2022-05-09 NOTE — TELEPHONE ENCOUNTER
Patient of Celestino Elias, last visit 12/20    She called to report nosebleeds 3x/day taking 10-15 min to stop x past few weeks; past 4 days having daily bleeding; bled all over her hair and shirt at dinner  She asked if it could be a side effect of keppra  Discussed has been on keppra since 2020 without event; patient unsure if she is having sinus problems or is allergy related; shared needed cauterization years ago and was seeing specialist for sinus problem in the past also thought blowing her nose may have caused the bleeding  keprra 500 mg q 12 hours   has been taking since October 2020    We discussed to call PCP to assess and go to ED if recurs and has difficulty stopping bleeding  She said she has ENT physician so will also try to schedule with them if not a long wait time  Please provide recommendation,  Thank you       best cb [phone] (okay to leave detailed message)

## 2022-05-09 NOTE — TELEPHONE ENCOUNTER
Agree with above recommendation  Did not feel that this would be related to [address]    Thank you

## 2022-05-11 ENCOUNTER — TELEPHONE (OUTPATIENT)
Dept: FAMILY MEDICINE CLINIC | Facility: CLINIC | Age: 72
End: 2022-05-11

## 2022-05-11 DIAGNOSIS — R04.0 BLEEDING FROM THE NOSE: Primary | ICD-10-CM

## 2022-05-12 ENCOUNTER — OFFICE VISIT (OUTPATIENT)
Dept: FAMILY MEDICINE CLINIC | Facility: CLINIC | Age: 72
End: 2022-05-12
Payer: COMMERCIAL

## 2022-05-12 VITALS
SYSTOLIC BLOOD PRESSURE: 120 MMHG | BODY MASS INDEX: 28.66 KG/M2 | WEIGHT: 172 LBS | DIASTOLIC BLOOD PRESSURE: 74 MMHG | HEIGHT: 65 IN | TEMPERATURE: 97.6 F | HEART RATE: 74 BPM | OXYGEN SATURATION: 97 %

## 2022-05-12 DIAGNOSIS — Z12.31 ENCOUNTER FOR SCREENING MAMMOGRAM FOR MALIGNANT NEOPLASM OF BREAST: ICD-10-CM

## 2022-05-12 DIAGNOSIS — Z12.11 SCREENING FOR COLON CANCER: ICD-10-CM

## 2022-05-12 DIAGNOSIS — Z13.220 ENCOUNTER FOR SCREENING FOR LIPID DISORDER: ICD-10-CM

## 2022-05-12 DIAGNOSIS — G40.909 NONINTRACTABLE EPILEPSY WITHOUT STATUS EPILEPTICUS, UNSPECIFIED EPILEPSY TYPE (HCC): Primary | ICD-10-CM

## 2022-05-12 DIAGNOSIS — R04.0 EPISTAXIS: ICD-10-CM

## 2022-05-12 DIAGNOSIS — I69.851 HEMIPLEGIA AND HEMIPARESIS FOLLOWING OTHER CEREBROVASCULAR DISEASE AFFECTING RIGHT DOMINANT SIDE (HCC): ICD-10-CM

## 2022-05-12 PROBLEM — D49.6 BRAIN TUMOR (HCC): Status: RESOLVED | Noted: 2020-09-11 | Resolved: 2022-05-12

## 2022-05-12 PROBLEM — G93.6 CEREBRAL EDEMA (HCC): Status: RESOLVED | Noted: 2020-09-11 | Resolved: 2022-05-12

## 2022-05-12 PROCEDURE — 99214 OFFICE O/P EST MOD 30 MIN: CPT | Performed by: FAMILY MEDICINE

## 2022-05-12 PROCEDURE — 3725F SCREEN DEPRESSION PERFORMED: CPT | Performed by: FAMILY MEDICINE

## 2022-05-12 PROCEDURE — 1036F TOBACCO NON-USER: CPT | Performed by: FAMILY MEDICINE

## 2022-05-12 PROCEDURE — 3008F BODY MASS INDEX DOCD: CPT | Performed by: FAMILY MEDICINE

## 2022-05-12 PROCEDURE — 1160F RVW MEDS BY RX/DR IN RCRD: CPT | Performed by: FAMILY MEDICINE

## 2022-05-12 NOTE — PROGRESS NOTES
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 ([facility] )  Comments:  Stable, unchanged  Orders:  -     CBC and differential; Future  -     Comprehensive metabolic panel; Future  -     TSH, 3rd generation; Future    Epistaxis  Comments:  Discussed potential causative factors with patient, symptomatic care, referral placed for ENT  Orders:  -     Ambulatory Referral to Otolaryngology; Future  -     CBC and differential; Future    Encounter for screening mammogram for malignant neoplasm of breast  -     Mammo screening bilateral w 3d & cad; Future    Encounter for screening for lipid disorder  -     Lipid panel; Future    Screening for colon cancer  -     Cologuard      Discussed reviewed age-appropriate anticipatory guidance, encouraged routine pressure screening, mammogram ordered, also recommended colon cancer screening recommendations made Cologuard    Subjective:    Patient ID: James Dill is a 80 y o  female      Like referral to ENT   Complaining of frequent nose bleeds , which began in and around [place]  controlled with local compression , and paper towels   At least 5-6 episodes   Each episode lasting approximately 5 minutes each episode control with local compression  Does have  with sinus and allergy issue , presently on no medications, negative antihistamines nasal sprays, self treating with humidified air  Denies any sinus pressure pain  Denies any active bleeding at present last episode yesterday    Seizure disorder, has had no active issues since brain surgery, continues on Keppra, as per Neurology    Status post left frontal parietal craniotomy mass resection on 10/15 2020, md katz , 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 , mri 4/21 neg residual findings   Continue use of right ankle orthotic for support when ambulating        The following portions of the patient's history were reviewed and updated as appropriate: allergies, current medications, past family history, past medical history, past social history, past surgical history and problem list     Review of Systems   Constitutional: Negative for appetite change, chills, fever and unexpected weight change  HENT: Negative for congestion, dental problem, ear pain, hearing loss, postnasal drip, rhinorrhea, sinus pressure, sinus pain, sneezing, sore throat, tinnitus and voice change  Eyes: Negative for visual disturbance  Respiratory: Negative for apnea, cough, chest tightness and shortness of breath  Cardiovascular: Negative for chest pain, palpitations and leg swelling  Gastrointestinal: Negative for abdominal pain, blood in stool, constipation, diarrhea, nausea and vomiting  Endocrine: Negative for cold intolerance, heat intolerance, polydipsia, polyphagia and polyuria  Genitourinary: Negative for decreased urine volume, difficulty urinating, dysuria, frequency and hematuria  Musculoskeletal: Negative for arthralgias, back pain, gait problem, joint swelling and myalgias  Skin: Negative for color change, rash and wound  Allergic/Immunologic: Negative for environmental allergies and food allergies  Neurological: Negative for dizziness, syncope, weakness, light-headedness, numbness and headaches  Hematological: Negative for adenopathy  Does not bruise/bleed easily  Psychiatric/Behavioral: Negative for sleep disturbance and suicidal ideas  The patient is not nervous/anxious            /74   Pulse 74   Temp 97 6 °F (36 4 °C)   Ht 5' 5" (1 651 m)   Wt 78 kg (172 lb)   SpO2 97%   BMI 28 62 kg/m²   Social History     Socioeconomic History    Marital status: /Civil Union     Spouse name: Not on file    Number of children: Not on file    Years of education: Not on file    Highest education level: Not on file   Occupational History    Not on file   Tobacco Use    Smoking status: Never Smoker    Smokeless tobacco: Never Used   Vaping Use    Vaping Use: Never used   Substance and Sexual Activity    Alcohol use: Yes     Comment: special occasions    Drug use: Never    Sexual activity: Yes   Other Topics Concern    Not on file   Social History Narrative    Not on file     Social Determinants of Health     Financial Resource Strain: Not on file   Food Insecurity: Not on file   Transportation Needs: Not on file   Physical Activity: Not on file   Stress: Not on file   Social Connections: Not on file   Intimate Partner Violence: Not on file   Housing Stability: Not on file     Past Medical History:   Diagnosis Date    Arthritis     L shoulder    Brain compression ([address] ) 2020    Hypercholesteremia     Pneumonia      Family History   Problem Relation Age of Onset    No Known Problems Mother     No Known Problems Sister      Past Surgical History:   Procedure Laterality Date     SECTION      CT EXCIS [address]  [place] Left 10/15/2020    Procedure: Image guided left frontoparietal craniotomy for tumor;  Surgeon: Quirino Figueroa MD;  [facility];  Service: Neurosurgery    TUBAL LIGATION         Current Outpatient Medications:     levETIRAcetam (KEPPRA) 500 mg tablet, TAKE 1 TABLET BY MOUTH EVERY 12 HOURS, Disp: 60 tablet, Rfl: 5    Allergies   Allergen Reactions    Gadolinium Derivatives Seizures    Trilipix [Choline Fenofibrate] Other (See Comments)     Pain Started in the upper/mid back and radiated into the front (chest area)  Pt unaware of this response and is unsure  Lab Review   No visits with results within 6 Month(s) from this visit     Latest known visit with results is:   Appointment on 2021 Component Date Value    Levetiracetam Lvl 01/11/2021 35 4         Imaging: No results found  Objective:     Physical Exam  Constitutional:       General: She is not in acute distress  Appearance: She is not ill-appearing or toxic-appearing  HENT:      Head: Normocephalic and atraumatic  Nose: Nose normal       Right Nostril: No epistaxis  Left Nostril: No epistaxis  Right Turbinates: Enlarged  Left Turbinates: Enlarged  Right Sinus: No maxillary sinus tenderness or frontal sinus tenderness  Left Sinus: No maxillary sinus tenderness or frontal sinus tenderness  Comments: Right/  left nares examined no active bleeding noted  Eyes:      Conjunctiva/sclera: Conjunctivae normal    Neck:      Vascular: No carotid bruit  Cardiovascular:      Rate and Rhythm: Normal rate and regular rhythm  Pulses: Normal pulses  Pulmonary:      Effort: Pulmonary effort is normal    Musculoskeletal:      Comments: Right ankle orthotic   Lymphadenopathy:      Cervical: No cervical adenopathy  Skin:     General: Skin is warm and dry  There are no Patient Instructions on file for this visit  Howard So, CRNP    Portions of the record may have been created with voice recognition software  Occasional wrong word or "sound a like" substitutions may have occurred due to the inherent limitations of voice recognition software  Read the chart carefully and recognize, using context, where substitutions have occurred

## 2022-05-13 ENCOUNTER — TELEPHONE (OUTPATIENT)
Dept: FAMILY MEDICINE CLINIC | Facility: CLINIC | Age: 72
End: 2022-05-13

## 2022-05-13 NOTE — TELEPHONE ENCOUNTER
Pt called for a different referral for her nose bleed needs to be seen sooner then two weeks if you can put in another referral

## 2022-05-16 DIAGNOSIS — R04.0 EPISTAXIS: Primary | ICD-10-CM

## 2022-05-16 NOTE — TELEPHONE ENCOUNTER
Pt aware and given the number [phone]  Pt had complaints of about the original ENT office she called  Pt informed we have no control over that office advised to call the  for that office

## 2022-06-20 DIAGNOSIS — G40.109 PARTIAL SYMPTOMATIC EPILEPSY WITH SIMPLE PARTIAL SEIZURES, NOT INTRACTABLE, WITHOUT STATUS EPILEPTICUS (HCC): ICD-10-CM

## 2022-06-20 RX ORDER — LEVETIRACETAM 500 MG/1
TABLET ORAL
Qty: 180 TABLET | Refills: 2 | Status: SHIPPED | OUTPATIENT
Start: 2022-06-20

## 2022-07-13 ENCOUNTER — OFFICE VISIT (OUTPATIENT)
Dept: NEUROLOGY | Facility: CLINIC | Age: 72
End: 2022-07-13
Payer: COMMERCIAL

## 2022-07-13 VITALS
SYSTOLIC BLOOD PRESSURE: 120 MMHG | DIASTOLIC BLOOD PRESSURE: 70 MMHG | HEIGHT: 65 IN | WEIGHT: 172 LBS | HEART RATE: 79 BPM | BODY MASS INDEX: 28.66 KG/M2

## 2022-07-13 DIAGNOSIS — G40.109 PARTIAL SYMPTOMATIC EPILEPSY WITH SIMPLE PARTIAL SEIZURES, NOT INTRACTABLE, WITHOUT STATUS EPILEPTICUS (HCC): Primary | ICD-10-CM

## 2022-07-13 DIAGNOSIS — D32.9 BENIGN MENINGIOMA (HCC): ICD-10-CM

## 2022-07-13 PROCEDURE — 1160F RVW MEDS BY RX/DR IN RCRD: CPT | Performed by: PSYCHIATRY & NEUROLOGY

## 2022-07-13 PROCEDURE — 99214 OFFICE O/P EST MOD 30 MIN: CPT | Performed by: PSYCHIATRY & NEUROLOGY

## 2022-07-13 NOTE — PROGRESS NOTES
Geovanny Sanchez is a 71 y o  female returns follow-up today with history of epilepsy and meningioma status post resection    Assessment:  1  Partial symptomatic epilepsy with simple partial seizures, not intractable, without status epilepticus ([facility] )    2  Benign meningioma ([facility] )        Plan:  Continue Keppra   Follow-up 6 months    Discussion:  Andres Cole has not had any further seizures  She continues report a numb tingly heavy feeling on the right side leg greater than arm  She is exploring options of initiating physical therapy again and discussed a few different places to check in her area  Reports that her right foot turns in which is why she uses the brace and asked to physical therapy will resolve this  Explained that it would not  She is not interested in pursuing Botox  She also had questions about swelling in her right leg and referred her to her primary doctor to discuss this  She also reports concerns regarding heart attacks and stroke  She does not have risk factors  No contraindication with Keppra having dental work done  I will see her back in follow-up in 6 months  She anticipates repeat brain MRI in November      Subjective:    HPI  Andres Cole returns in follow-up today  She is accompanied by her   She continues report a feeling of heaviness on the right side associated with tingling more prominent in the leg than arm  These changes been present since her meningioma resection  She states she had been going to physical therapy until last March when it was discontinued  She is exploring options of restarting physical therapy and had questions regarding place to go  She is interested in working on machines to improve her strength    She continues report that her right foot tends to turn in without wearing her brace      Past Medical History:   Diagnosis Date    Arthritis     L shoulder    Brain compression ([facility] ) 9/11/2020    Hypercholesteremia     Pneumonia        Family History:  Family History   Problem Relation Age of Onset    No Known Problems Mother     No Known Problems Sister        Past Surgical History:  Past Surgical History:   Procedure Laterality Date     SECTION      [place] EXCIS [address] MENINGIOMA Left 10/15/2020    Procedure: Image guided left frontoparietal craniotomy for tumor;  Surgeon: Wale Kerr MD;  [facility];  Service: Neurosurgery    TUBAL LIGATION         Social History:   reports that she has never smoked  She has never used smokeless tobacco  She reports current alcohol use  She reports that she does not use drugs  Allergies:  Gadolinium derivatives and Trilipix [choline fenofibrate]      Current Outpatient Medications:     levETIRAcetam (KEPPRA) 500 mg tablet, TAKE 1 TABLET BY MOUTH EVERY 12 HOURS, Disp: 180 tablet, Rfl: 2    I have reviewed the past medical, social and family history, current medications, allergies, vitals, review of systems and updated this information as appropriate today     Objective:    Vitals:  Blood pressure 120/70, pulse 79, height 5' 5" (1 651 m), weight 78 kg (172 lb), not currently breastfeeding  Physical Exam    Neurological Exam    GENERAL:  Well-developed well-nourished woman in no acute distress  HEENT/NECK: Head is atraumatic normocephalic, neck is supple  NEUROLOGIC:  Mental Status: Awake and alert without aphasia  Cranial Nerves: Extraocular movements are full  Face is symmetrical  Motor:  Right hemiparesis is noted  Coordination:  Ambulates with a right hemiparetic gait pattern                  ROS:    Review of Systems   Constitutional: Negative  Negative for appetite change and fever  HENT: Negative  Negative for hearing loss, tinnitus, trouble swallowing and voice change  Eyes: Negative  Negative for photophobia and pain  Respiratory: Negative  Negative for shortness of breath  Cardiovascular: Negative  Negative for palpitations  Gastrointestinal: Negative    Negative for nausea and vomiting  Endocrine: Negative  Negative for cold intolerance  Genitourinary: Negative  Negative for dysuria, frequency and urgency  Musculoskeletal: Negative  Negative for myalgias and neck pain  Skin: Negative  Negative for rash  Neurological: Positive for weakness and numbness  Negative for dizziness, tremors, seizures, syncope, facial asymmetry, speech difficulty, light-headedness and headaches  Patient states numbness/tingling/weakness on right side of her body   Hematological: Negative  Does not bruise/bleed easily  Psychiatric/Behavioral: Negative  Negative for confusion, hallucinations and sleep disturbance

## 2022-07-20 ENCOUNTER — TELEPHONE (OUTPATIENT)
Dept: NEUROLOGY | Facility: CLINIC | Age: 72
End: 2022-07-20

## 2022-07-20 DIAGNOSIS — G81.91 RIGHT HEMIPARESIS (HCC): Primary | ICD-10-CM

## 2022-07-20 NOTE — TELEPHONE ENCOUNTER
pt called and states that she would like to start PT     per office note-  She states she had been going to physical therapy until last March when it was discontinued  She is exploring options of restarting physical therapy and had questions regarding place to go       please enter PT order  No need to call pt back, she will call therapy location either today or tomorrow to schedule  [phone] to leave detailed message

## 2022-07-29 ENCOUNTER — EVALUATION (OUTPATIENT)
Dept: PHYSICAL THERAPY | Facility: CLINIC | Age: 72
End: 2022-07-29
Payer: COMMERCIAL

## 2022-07-29 DIAGNOSIS — Z87.898 HISTORY OF BRAIN TUMOR: ICD-10-CM

## 2022-07-29 DIAGNOSIS — Z74.09 IMPAIRED MOBILITY AND ENDURANCE: ICD-10-CM

## 2022-07-29 DIAGNOSIS — G81.91 RIGHT HEMIPARESIS (HCC): Primary | ICD-10-CM

## 2022-07-29 PROCEDURE — 97162 PT EVAL MOD COMPLEX 30 MIN: CPT

## 2022-07-29 PROCEDURE — 97112 NEUROMUSCULAR REEDUCATION: CPT

## 2022-07-29 NOTE — PROGRESS NOTES
PT Evaluation     Today's date: 2022  Patient name: Tl Webster  : 1950  MRN: [id number]  Referring provider: Valeriano Alves MD  Dx:   Encounter Diagnosis     ICD-10-CM    1  Right hemiparesis ([facility] )  G81 91 Ambulatory Referral to Physical Therapy     PT plan of care cert/re-cert   2  Impaired mobility and endurance  Z74 09 PT plan of care cert/re-cert   3  History of brain tumor  Z87 898        Start Time: 0815  Stop Time: 0900  Total time in clinic (min): 45 minutes    Assessment  Assessment details: Tl Webster is a 71 y o  female presenting with impaired mobility with RLE hemiparesis 2/2 brain tumor removal approximately 2 years ago  Pt presents with associated impairments, including RLE synergy patterning, abnormal gait pattern, reduced gait speed, reduced LE strength/power, impaired functional mobility, and fall risk  Pt has goal of ambulation without R AFO, and realism of this goal will be determined in upcoming visits with further assessment of pt's spasticity, synergy patterns, and ambulation without AFO  Next visit will also include formal assessment of endurance and dynamic balance as these were not assessed today due to time limitations  Pt will benefit from skilled PT interventions for gait training, balance training, neuromuscular reeducation, strengthening, endurance training, and functional task practice to assist pt in improving current level of mobility and increasing QOL  Impairments: abnormal coordination, abnormal gait, abnormal muscle firing, abnormal muscle tone, abnormal movement, activity intolerance, impaired balance, impaired physical strength, lacks appropriate home exercise program, safety issue and poor body mechanics    Symptom irritability: moderateUnderstanding of Dx/Px/POC: good   Prognosis: good    Goals    Short Term Goals: In 6 weeks, the patient will:  1   Improve self-selected gait speed by 0 1 m/s to indicate improving gait and decreased risk of falls  2  Decrease time for 5xSTS to <15s to indicate improved LE strength/power and improving functional mobility capacity   3  Perform home exercise program with min(A) or supervision   4  Additional goals added after further assessment next visit     Long Term Goals: In 12 weeks, the patient will:  1  Improve fast gait speed by 0 2 m/s to indicate decreased risk of falls and improved functional mobility   2  Increase FOTO to greater than predicted value  3  Demonstrate ability to perform home exercise program independently to maintain/continue progress towards goals         Plan  Planned therapy interventions: activity modification, ADL retraining, balance, balance/weight bearing training, body mechanics training, breathing training, coordination, functional ROM exercises, gait training, graded exercise, home exercise program, transfer training, therapeutic exercise, therapeutic activities, strengthening, self care, patient education, postural training, neuromuscular re-education, motor coordination training and IADL retraining  Frequency: 2x week  Duration in weeks: 12  Treatment plan discussed with: patient        Subjective  HPI: Pt has R hemiparesis due to a brain tumor that has been removed (2 years ago)  Pt has numbness in her R arm and leg  She also notes heaviness in her R leg and some in her R upper arm  Pt was being seen at [facility] for PT, but it has been over a year since she had her last bout of PT  She feels like the most difficult part of her mobility is due to her R foot turning in  Time since diagnosis/onset: 2 years  Prior/current AD use: Pt uses a quad cane  Falls: In the last year, yes  None in the past several months  Pt does note near falls when her foot turns in, but she is able to catch herself on furniture  Pain: No pain reported     Home Setup: Pt lives in a [facility] with railings at her stairs  Social Support: Pt lives with her  and her son   Her daughter lives nearby  ADL Assistance: Pt requires help with her brace and sneaker due to ankle inversion  She is able to dress and bathe herself  Occupation/Hobbies: Pt is retired, she was in retail sales  She used to enjoy biking, horseback riding, dancing  Goals: Pt would like to be able to walk without her brace  She would also like to be able to walk up and down the hill by her house more easily  Pertinent PMHx: Pt notes sinus issues  Otherwise healthy  Objective    Vitals:   BP: 123/69 mmHg   Spo2: 97%  HR:66 bpm    FGA NV   6 Minute Walk Test (ft): NV   Gait Speed (m/s): Self selected: 6m/12 9s = 0 47 m/s  Fast: 6m/11 5s = 0 52 m/s    5x Sit To Stand (s): 17 05s    TUG: With SPC: 19 9   Without SPC: 29s        Coordination Left Right   Heel To Bauer WNL Able to compelte with time    Finger To Nose WNL Mild dysmetria    Rapid Alternating Movement     UE WNL WNL   LE WNL Cannot complete        Muscle Tone Left Right   Modified Marlo Scale NV    Hamstring     Gastroc     Quad         Gait Assessment: Pt ambulates with quad cane and R AFO with decreased bryon, reduced R stance time, decreased L step length, forefoot initial contact, and reduced pushoff            Precautions: Fall risk       7/29            Manuals                                                                 Neuro Re-Ed             Pt education on POC  SM                                                                                          Ther Ex                                                                                                                     Ther Activity                                       Gait Training                                       Modalities

## 2022-08-04 ENCOUNTER — OFFICE VISIT (OUTPATIENT)
Dept: PHYSICAL THERAPY | Facility: CLINIC | Age: 72
End: 2022-08-04
Payer: COMMERCIAL

## 2022-08-04 DIAGNOSIS — Z87.898 HISTORY OF BRAIN TUMOR: ICD-10-CM

## 2022-08-04 DIAGNOSIS — Z74.09 IMPAIRED MOBILITY AND ENDURANCE: ICD-10-CM

## 2022-08-04 DIAGNOSIS — G81.91 RIGHT HEMIPARESIS (HCC): Primary | ICD-10-CM

## 2022-08-04 PROCEDURE — 97530 THERAPEUTIC ACTIVITIES: CPT

## 2022-08-04 PROCEDURE — 97112 NEUROMUSCULAR REEDUCATION: CPT

## 2022-08-04 NOTE — PROGRESS NOTES
Daily Note     Today's date: 2022  Patient name: Devika Eisenberg  : 1950  MRN: [id number]  Referring provider: Felix Packer MD  Dx:   Encounter Diagnosis     ICD-10-CM    1  Right hemiparesis ([address] )  G81 91    2  Impaired mobility and endurance  Z74 09    3  History of brain tumor  Z87 898        Start Time: 1702  Stop Time: 1755  Total time in clinic (min): 53 minutes    Subjective: Pt reported she has been feeling okay since her evaluation  Objective: See treatment diary below     FGA:   6 MWT: 633 ft (SPO2 98%, HR 97 bpm)        Muscle Tone Left Right   Modified Marlo Scale      Hamstring  0  3+   Gastroc  0  3+   Quad  0  3+        Gait assessment without AFO: Pt ambulates with significant R foot plantarflexion and inversion  Assessment: Additional assessments performed at this visit indicate pt has significant dynamic balance deficits  Intact safety insight, unwilling to try tasks she does not think she can complete safely to avoid LOB  Trialed STS with RLE posterior bias to focus on RLE strengthening, however pt felt she could not complete the transfer this way  However, she was able to demonstrate decent R knee terminal knee control, able to avoid hyperextension during ambulation and step ups  Patient would benefit from continued PT to continue improving gait, balance, and safe functional mobility capacity  Plan: Continue per plan of care        Precautions: Fall risk                  Manuals                                                                 Neuro Re-Ed             Pt education on POC  SM SM           Step ups   2x10 6"           STS   5x normal, 5x trialed RLE bias                                                               Ther Ex                                                                                                                     Ther Activity             Mobility assessments   20'                        Gait Training Modalities

## 2022-08-09 ENCOUNTER — OFFICE VISIT (OUTPATIENT)
Dept: PHYSICAL THERAPY | Facility: CLINIC | Age: 72
End: 2022-08-09
Payer: COMMERCIAL

## 2022-08-09 DIAGNOSIS — Z87.898 HISTORY OF BRAIN TUMOR: ICD-10-CM

## 2022-08-09 DIAGNOSIS — Z74.09 IMPAIRED MOBILITY AND ENDURANCE: ICD-10-CM

## 2022-08-09 DIAGNOSIS — G81.91 RIGHT HEMIPARESIS (HCC): Primary | ICD-10-CM

## 2022-08-09 PROCEDURE — 97112 NEUROMUSCULAR REEDUCATION: CPT

## 2022-08-09 NOTE — PROGRESS NOTES
Daily Note     Today's date: 2022  Patient name: Sunil Rivera  : 1950  MRN: [id number]  Referring provider: Jeanne Rodríguez MD  Dx:   Encounter Diagnosis     ICD-10-CM    1  Right hemiparesis ([address] )  G81 91    2  Impaired mobility and endurance  Z74 09    3  History of brain tumor  Z87 [id number]        Start Time:   Stop Time: 945  Total time in clinic (min): 42 minutes    Subjective: Pt did report that her R knee has been bothering her a bit  Objective: See treatment diary below      Assessment: Tolerated treatment well  She was instructed to perform STS with feet even rather than RLE placed anteriorly, as trialing RLE placed more posteriorly was too difficult at last session  She was able to continue step ups, demonstrating difficulty with RLE hip/knee flexion when RLE was trailing leg  Weighted step taps were introduced to facilitate resisted RLE hip/knee flexion  R ankle eversion/dorsiflexion AAROM was introduced with NMES, and various placements were trialed to achieve optimal motion at ankle  Pt was able to tolerate this well  She would benefit from continued PT to continue strengthening, gait training, and balance training to improve current level of function and safety with functional mobility  Plan: Continue per plan of care        Precautions: Fall risk                 Manuals                                                                 Neuro Re-Ed             Pt education on POC  SM SM SM          Step ups   2x10 6" 2x10 6"          STS   5x normal, 5x trialed RLE bias 10x with feet even          Weighted step tap   2x10 8" with 4#          AAROM RLE eversion/DF with NMES   15'                                     Ther Ex                                                                                                                     Ther Activity             Mobility assessments   20'                        Gait Training Modalities

## 2022-08-11 ENCOUNTER — OFFICE VISIT (OUTPATIENT)
Dept: PHYSICAL THERAPY | Facility: CLINIC | Age: 72
End: 2022-08-11
Payer: COMMERCIAL

## 2022-08-11 DIAGNOSIS — Z74.09 IMPAIRED MOBILITY AND ENDURANCE: ICD-10-CM

## 2022-08-11 DIAGNOSIS — G81.91 RIGHT HEMIPARESIS (HCC): Primary | ICD-10-CM

## 2022-08-11 DIAGNOSIS — Z87.898 HISTORY OF BRAIN TUMOR: ICD-10-CM

## 2022-08-11 PROCEDURE — 97112 NEUROMUSCULAR REEDUCATION: CPT

## 2022-08-11 NOTE — PROGRESS NOTES
Daily Note     Today's date: 2022  Patient name: Ericka Woody  : 1950  MRN: [id number]  Referring provider: Desirae De Souza MD  Dx:   Encounter Diagnosis     ICD-10-CM    1  Right hemiparesis ([address] )  G81 91    2  Impaired mobility and endurance  Z74 09    3  History of brain tumor  Z87 898        Start Time: 1630  Stop Time: 1715  Total time in clinic (min): 45 minutes    Subjective: Pt reported that she spent most of the day yesterday without her brace because it was irritating her  She sat mostly but when she moved around she was just very careful about her foot turning in without the brace  Objective: See treatment diary below      Assessment: Tolerated treatment well  Continued troubleshooting was required for electrode placement with RLE stimulation due to multiple placements causing exacerbated R ankle inversion/plantarflexion  NMES was performed at start of session, and following this she demonstrated decreased R ankle inversion with exercises for most of the remainder of the session  As she fatigued towards the end of the session, R ankle inversion increased  She was significantly challenged by standing hamstring curls  Patient would benefit from continued PT to continue RLE NMES, strengthening, balance training, and functional task practice to assist pt in improving safety with functional mobility  Plan: Continue per plan of care        Precautions: Fall risk                Manuals                                                                 Neuro Re-Ed             Pt education on POC  SM SM SM          Step ups   2x10 6" 2x10 6" 2x10 6"         STS   5x normal, 5x trialed RLE bias 10x with feet even 10x with feet even          Weighted step tap   2x10 8" with 4# 2x10 6" with 4#         AAROM RLE eversion/DF with NMES   15'  15'          Standing hamstring curls     2x10 alt                       Ther Ex                                       Ther Activity Mobility assessments   20'                        Gait Training                                       Modalities

## 2022-08-15 ENCOUNTER — OFFICE VISIT (OUTPATIENT)
Dept: PHYSICAL THERAPY | Facility: CLINIC | Age: 72
End: 2022-08-15
Payer: COMMERCIAL

## 2022-08-15 DIAGNOSIS — Z87.898 HISTORY OF BRAIN TUMOR: ICD-10-CM

## 2022-08-15 DIAGNOSIS — G81.91 RIGHT HEMIPARESIS (HCC): Primary | ICD-10-CM

## 2022-08-15 DIAGNOSIS — Z74.09 IMPAIRED MOBILITY AND ENDURANCE: ICD-10-CM

## 2022-08-15 PROCEDURE — 97110 THERAPEUTIC EXERCISES: CPT

## 2022-08-15 PROCEDURE — 97112 NEUROMUSCULAR REEDUCATION: CPT

## 2022-08-15 NOTE — PROGRESS NOTES
Daily Note     Today's date: 8/15/2022  Patient name: Ravindra Graham  : 1950  MRN: [id number]  Referring provider: Lauren Quiros MD  Dx: No diagnosis found  Subjective: Pt w/ no new complaints  She expressed goals of walking w/o MAFO and being able to mount/dismount horse to return to horseback riding  Objective: See treatment diary below      Assessment: Tolerated treatment well  Began w/ NMES for ankle DF/EV followed by exercises targeting LE strength with focus on quad muscles  Improved form to toe taps to 6" box w/ occasional tactile cues  Over next several sessions began to reduce UE support w/ standing TE to incorporate balance tasks  Patient demonstrated fatigue post treatment and would benefit from continued PT      Plan: Continue per plan of care  Precautions: Fall risk       7/29 8/4 8/9 8/11 8/15        Manuals                                                                 Neuro Re-Ed             Pt education on POC  SM SM SM          Step ups   2x10 6" 2x10 6" 2x10 6" 2x10 6" fwd/lat        Step-downs      2x10 6"        STS   5x normal, 5x trialed RLE bias 10x with feet even 10x with feet even          Weighted step tap   2x10 8" with 4# 2x10 6" with 4# 2x10 6" w/ 4#        AAROM RLE eversion/DF with NMES   15'  15'  10'        Standing hamstring curls     2x10 alt  2x10 alt 2#                     Ther Ex             Leg press      SL & DL 65# 2x10 ea        Standing hip ext       2x10 2#        Standing hip abd     NV                                  Ther Activity             Mobility assessments   20'           Hurdles (fwd/lat)                          Gait Training                                       Modalities

## 2022-09-07 ENCOUNTER — APPOINTMENT (OUTPATIENT)
Dept: PHYSICAL THERAPY | Facility: CLINIC | Age: 72
End: 2022-09-07
Payer: COMMERCIAL

## 2022-09-08 ENCOUNTER — OFFICE VISIT (OUTPATIENT)
Dept: PHYSICAL THERAPY | Facility: CLINIC | Age: 72
End: 2022-09-08
Payer: COMMERCIAL

## 2022-09-08 DIAGNOSIS — Z87.898 HISTORY OF BRAIN TUMOR: ICD-10-CM

## 2022-09-08 DIAGNOSIS — G81.91 RIGHT HEMIPARESIS (HCC): Primary | ICD-10-CM

## 2022-09-08 DIAGNOSIS — Z74.09 IMPAIRED MOBILITY AND ENDURANCE: ICD-10-CM

## 2022-09-08 PROCEDURE — 97110 THERAPEUTIC EXERCISES: CPT

## 2022-09-08 PROCEDURE — 97530 THERAPEUTIC ACTIVITIES: CPT

## 2022-09-08 PROCEDURE — 97112 NEUROMUSCULAR REEDUCATION: CPT

## 2022-09-08 NOTE — PROGRESS NOTES
Daily Note     Today's date: 2022  Patient name: Devika Eisenberg  : 1950  MRN: [id number]  Referring provider: Felix Packer MD  Dx:   Encounter Diagnosis     ICD-10-CM    1  Right hemiparesis ([address] )  G81 91    2  Impaired mobility and endurance  Z74 09    3  History of brain tumor  Z87 898                   Subjective: Pt w/ no new complaints  Reports that she did a lot of walking and stairs while on vacation  Objective: See treatment diary below      Assessment: Tolerated treatment well  Began w/ NMES to R DF muscles followed by active exercise w/ AFO donned  Progress STS w/ foam under feet NV as pt able to complete STS repeats at end of session w/ ease  Patient demonstrated fatigue post treatment, exhibited good technique with therapeutic exercises and would benefit from continued PT      Plan: Continue per plan of care  Precautions: Fall risk       7/29 8/4 8/9 8/11 8/15 9/8       Manuals                                                                 Neuro Re-Ed             Pt education on POC  SM SM SM          Step ups   2x10 6" 2x10 6" 2x10 6" 2x10 6" fwd/lat Up & over 2# fwd only x30        Step-downs      2x10 6"        STS   5x normal, 5x trialed RLE bias 10x with feet even 10x with feet even   x20 w/ feet even       Weighted step tap   2x10 8" with 4# 2x10 6" with 4# 2x10 6" w/ 4# 2x10 6" w/ 4#       AAROM RLE eversion/DF with NMES   15'  15'  10' 10'       Standing hamstring curls     2x10 alt  2x10 alt 2# 2x10 2# B                    Ther Ex             Leg press      SL & DL 65# 2x10 ea SL & DL 65# 3x10   ea       Standing hip ext       2x10 2# 2x10 2# B       Standing hip abd     NV 2x10 2# B                                 Ther Activity             Mobility assessments   20'           Hurdles (fwd/lat)      NV       Side stepping      x5 laps 0UE                    Gait Training                                       Modalities

## 2022-09-12 ENCOUNTER — OFFICE VISIT (OUTPATIENT)
Dept: PHYSICAL THERAPY | Facility: CLINIC | Age: 72
End: 2022-09-12
Payer: COMMERCIAL

## 2022-09-12 DIAGNOSIS — G81.91 RIGHT HEMIPARESIS (HCC): Primary | ICD-10-CM

## 2022-09-12 DIAGNOSIS — Z74.09 IMPAIRED MOBILITY AND ENDURANCE: ICD-10-CM

## 2022-09-12 DIAGNOSIS — Z87.898 HISTORY OF BRAIN TUMOR: ICD-10-CM

## 2022-09-12 PROCEDURE — 97112 NEUROMUSCULAR REEDUCATION: CPT

## 2022-09-12 PROCEDURE — 97530 THERAPEUTIC ACTIVITIES: CPT

## 2022-09-12 PROCEDURE — 97110 THERAPEUTIC EXERCISES: CPT

## 2022-09-12 NOTE — PROGRESS NOTES
Daily Note     Today's date: 2022  Patient name: Morenita Frankel  : 1950  MRN: [id number]  Referring provider: Gideon Haro MD  Dx:   Encounter Diagnosis     ICD-10-CM    1  Right hemiparesis ([address] )  G81 91    2  Impaired mobility and endurance  Z74 09    3  History of brain tumor  Z87 898                   Subjective: Pt w/ no new complaints  Objective: See treatment diary below      Assessment: Tolerated treatment well  Active interventions focused on engaging R hip flexors and hamstrings during swing phase of stance and w/ elevations  Patient demonstrated fatigue post treatment and would benefit from continued PT  Discussion had w/ pt regarding continued use of NMES in clinic vs use of personal device at home to maximize clinic time  Pt agreeable to use home device and not complete intervention in clinic as frequently  Pt provided w/ updated parameters for home use  Also updated HEP to include self-stretch of R plantar flexors and toes  Plan: Continue per plan of care  Precautions: Fall risk       7/29 8/4 8/9 8/11 8/15 9/8 9/12      Manuals                                                                 Neuro Re-Ed             Pt education on POC  SM SM SM    NMES for home and stretches for R calf & toes      Step ups   2x10 6" 2x10 6" 2x10 6" 2x10 6" fwd/lat Up & over 2# fwd only x30  Up & over fwd only x30       Step-downs      2x10 6"        STS   5x normal, 5x trialed RLE bias 10x with feet even 10x with feet even   x20 w/ feet even       Weighted step tap   2x10 8" with 4# 2x10 6" with 4# 2x10 6" w/ 4# 2x10 6" w/ 4# Black TB x20 R only      AAROM RLE eversion/DF with NMES   15'  15'  10' 10' 10'      Standing hamstring curls     2x10 alt  2x10 alt 2# 2x10 2# B                                 Ther Ex             Leg press      SL & DL 65# 2x10 ea SL & DL 65# 3x10   ea       Standing hip ext       2x10 2# 2x10 2# B       Standing hip abd     NV 2x10 2# B RTB 2x10 B      TKE NV      Passive stretch for R toes       x5min      R calf stretch w/ towel in long sit       3x30"                   Ther Activity             Mobility assessments   20'           Hurdles (fwd/lat)      NV x3 laps ea      Side stepping      x5 laps 0UE x5 laps RTB 0UE                   Gait Training             Emphasis on R knee drive        [id number] w/ empasis on knee drive [id number] "quick  Steps"      TM w/ metronome                           Modalities

## 2022-09-14 ENCOUNTER — OFFICE VISIT (OUTPATIENT)
Dept: PHYSICAL THERAPY | Facility: CLINIC | Age: 72
End: 2022-09-14
Payer: COMMERCIAL

## 2022-09-14 DIAGNOSIS — Z87.898 HISTORY OF BRAIN TUMOR: ICD-10-CM

## 2022-09-14 DIAGNOSIS — Z74.09 IMPAIRED MOBILITY AND ENDURANCE: ICD-10-CM

## 2022-09-14 DIAGNOSIS — G81.91 RIGHT HEMIPARESIS (HCC): Primary | ICD-10-CM

## 2022-09-14 PROCEDURE — 97530 THERAPEUTIC ACTIVITIES: CPT

## 2022-09-14 PROCEDURE — 97110 THERAPEUTIC EXERCISES: CPT

## 2022-09-14 PROCEDURE — 97116 GAIT TRAINING THERAPY: CPT

## 2022-09-14 PROCEDURE — 97112 NEUROMUSCULAR REEDUCATION: CPT

## 2022-09-14 NOTE — PROGRESS NOTES
Daily Note     Today's date: 2022  Patient name: Kwasi Alvarez  : 1950  MRN: [id number]  Referring provider: Vivian Garcia MD  Dx:   Encounter Diagnosis     ICD-10-CM    1  Right hemiparesis ([address] )  G81 91    2  Impaired mobility and endurance  Z74 09    3  History of brain tumor  Z87 898                   Subjective: Pt w/ some family health issues, has not been able to try NMES at home  this  Objective: See treatment diary below      Assessment: Tolerated treatment well  Session began w/ gait training w/ metronome on TM  Emphasis on even stride length and R knee flexion during swing phase  Improved performance particularly w/ L stride length over land following TM gait training  Patient demonstrated fatigue post treatment, exhibited good technique with therapeutic exercises and would benefit from continued PT  Pt to bring gripper socks vs sandals NV to begin to work on ankle stability w/o AFO  Plan: Continue per plan of care        Precautions: Fall risk       7/29 8/4 8/9 8/11 8/15 9/8 9/12 9/14     Manuals                                                                 Neuro Re-Ed             Pt education on POC  SM SM SM    NMES for home and stretches for R calf & toes      Step ups   2x10 6" 2x10 6" 2x10 6" 2x10 6" fwd/lat Up & over 2# fwd only x30  Up & over fwd only x30       Step-downs      2x10 6"        STS   5x normal, 5x trialed RLE bias 10x with feet even 10x with feet even   x20 w/ feet even       Weighted step tap   2x10 8" with 4# 2x10 6" with 4# 2x10 6" w/ 4# 2x10 6" w/ 4# Black TB x20 R only      AAROM RLE eversion/DF with NMES   15'  15'  10' 10' 10'      Standing hamstring curls     2x10 alt  2x10 alt 2# 2x10 2# B  2x10 2# L 0# R     Biodex Maze w/o AFO        NV     Biodex LOS w/o AFO        NV     Standing on foam w/o brace        NV                               Ther Ex             Leg press      SL & DL 65# 2x10 ea SL & DL 65# 3x10   ea  DL 85# 3x10 ea  SL 65#  2x10     Standing hip ext  2x10 2# 2x10 2# B  2# x30 B     Standing hip abd     NV 2x10 2# B RTB 2x10 B 2# x30 B     TKE       NV Black TB x30     Passive stretch for R toes       x5min      R calf stretch w/ towel in long sit       3x30"                   Ther Activity             Mobility assessments   20'           Hurdles (fwd/lat)      NV x3 laps ea x3 laps ea     Side stepping      x5 laps 0UE x5 laps RTB 0UE x5 laps RTB 0UE                  Gait Training             Emphasis on R knee drive        [id number] w/ empasis on knee drive [id number] "quick  Steps" 4x30'  As overground reinforcement following TM gait train     TM w/ metronome         0  6mph 10' then 0 7mph 5min  51bpm occasional assitance for R knee flexion                  Modalities

## 2022-09-19 ENCOUNTER — OFFICE VISIT (OUTPATIENT)
Dept: PHYSICAL THERAPY | Facility: CLINIC | Age: 72
End: 2022-09-19
Payer: COMMERCIAL

## 2022-09-19 DIAGNOSIS — Z74.09 IMPAIRED MOBILITY AND ENDURANCE: ICD-10-CM

## 2022-09-19 DIAGNOSIS — Z87.898 HISTORY OF BRAIN TUMOR: ICD-10-CM

## 2022-09-19 DIAGNOSIS — G81.91 RIGHT HEMIPARESIS (HCC): Primary | ICD-10-CM

## 2022-09-19 PROCEDURE — 97530 THERAPEUTIC ACTIVITIES: CPT

## 2022-09-19 PROCEDURE — 97112 NEUROMUSCULAR REEDUCATION: CPT

## 2022-09-19 PROCEDURE — 97116 GAIT TRAINING THERAPY: CPT

## 2022-09-19 NOTE — PROGRESS NOTES
Daily Note     Today's date: 2022  Patient name: Kwasi Alvarez  : 1950  MRN: [id number]  Referring provider: Vivian Garcia MD  Dx:   Encounter Diagnosis     ICD-10-CM    1  Right hemiparesis ([address] )  G81 91    2  Impaired mobility and endurance  Z74 09    3  History of brain tumor  Z87 898                   Subjective: Pt w/ no new complaints  Has not been able to try e-stim and stretches at home 2/2 continued family emergencies  Objective: See treatment diary below      Assessment: Tolerated treatment well  Initiated static standing balance w/o AFO today  Noted +use of R ankle strategies w/ exercises on Biodex  Continue w/ training R ankle proprioception as able  Re-initiate exercises to increase knee flexion w/ ambulation NV  Patient demonstrated fatigue post treatment and would benefit from continued PT      Plan: Continue per plan of care        Precautions: Fall risk        8/4 8/9 8/11 8/15 9/8 9/12 9/14 9/19    Manuals                                                                 Neuro Re-Ed             Pt education on POC  SM SM SM    NMES for home and stretches for R calf & toes      Step ups   2x10 6" 2x10 6" 2x10 6" 2x10 6" fwd/lat Up & over 2# fwd only x30  Up & over fwd only x30       Step-downs      2x10 6"        STS   5x normal, 5x trialed RLE bias 10x with feet even 10x with feet even   x20 w/ feet even       Weighted step tap   2x10 8" with 4# 2x10 6" with 4# 2x10 6" w/ 4# 2x10 6" w/ 4# Black TB x20 R only      AAROM RLE eversion/DF with NMES   15'  15'  10' 10' 10'      Standing hamstring curls     2x10 alt  2x10 alt 2# 2x10 2# B  2x10 2# L 0# R     Biodex Maze w/o AFO        NV x3 1UE    Biodex LOS w/o AFO        NV x3 1UE    Biodex WS        NV x2min R/L  x2min fwd/back 1uE    Biodex Postural Stability         x2min floor 12  x2min floor 10  0UE    Standing on foam w/o brace        NV NV                 Ther Ex             Leg press      SL & DL 65# 2x10 ea SL & DL 65# 3x10   ea  DL 85# 3x10 ea  SL   65#  2x10     Standing hip ext  2x10 2# 2x10 2# B  2# x30 B     Standing hip abd     NV 2x10 2# B RTB 2x10 B 2# x30 B     TKE       NV Black TB x30     Passive stretch for R toes       x5min      R calf stretch w/ towel in long sit       3x30"                   Ther Activity             Mobility assessments   20'           Hurdles (fwd/lat)      NV x3 laps ea x3 laps ea     Side stepping      x5 laps 0UE x5 laps RTB 0UE x5 laps RTB 0UE                  Gait Training             Emphasis on R knee drive        [id number] w/ empasis on knee drive [id number] "quick  Steps" 4x30'  As overground reinforcement following TM gait train  2x50ft over ground after TM     TM         0  6mph 10' then 0 7mph 5min  Metronome: 51bpm occasional assitance for R knee flexion TM x10min 0 7mph 0% include   x5min0  7mph 4% incline  occasional manual facilitation of R knee flexion                 Modalities

## 2022-09-26 ENCOUNTER — OFFICE VISIT (OUTPATIENT)
Dept: PHYSICAL THERAPY | Facility: CLINIC | Age: 72
End: 2022-09-26
Payer: COMMERCIAL

## 2022-09-26 DIAGNOSIS — G81.91 RIGHT HEMIPARESIS (HCC): Primary | ICD-10-CM

## 2022-09-26 DIAGNOSIS — Z74.09 IMPAIRED MOBILITY AND ENDURANCE: ICD-10-CM

## 2022-09-26 DIAGNOSIS — Z87.898 HISTORY OF BRAIN TUMOR: ICD-10-CM

## 2022-09-26 PROCEDURE — 97112 NEUROMUSCULAR REEDUCATION: CPT

## 2022-09-26 PROCEDURE — 97530 THERAPEUTIC ACTIVITIES: CPT

## 2022-09-26 PROCEDURE — 97116 GAIT TRAINING THERAPY: CPT

## 2022-09-26 NOTE — PROGRESS NOTES
Daily Note     Today's date: 2022  Patient name: Govind Bernal  : 1950  MRN: [id number]  Referring provider: Liset Cifuentes MD  Dx:   Encounter Diagnosis     ICD-10-CM    1  Right hemiparesis ([address] )  G81 91    2  Impaired mobility and endurance  Z74 09    3  History of brain tumor  Z87 898                   Subjective: Patient noted no new complaints  Objective: See treatment diary below      Assessment: Tolerated treatment fair  Patient was able to add standing on foam into treatment today with HHA  Patient was able to perform ambulation on treadmill with and without incline  Patient would benefit from continued PT      Plan: Continue per plan of care  Precautions: Fall risk       7/29 8/4 8/9 8/11 8/15 9/8 9/12 9/14 9/19 9/26   Manuals                                                                 Neuro Re-Ed             Pt education on POC  SM SM SM    NMES for home and stretches for R calf & toes      Step ups   2x10 6" 2x10 6" 2x10 6" 2x10 6" fwd/lat Up & over 2# fwd only x30  Up & over fwd only x30       Step-downs      2x10 6"        STS   5x normal, 5x trialed RLE bias 10x with feet even 10x with feet even   x20 w/ feet even       Weighted step tap   2x10 8" with 4# 2x10 6" with 4# 2x10 6" w/ 4# 2x10 6" w/ 4# Black TB x20 R only      AAROM RLE eversion/DF with NMES   15'  15'  10' 10' 10'      Standing hamstring curls     2x10 alt  2x10 alt 2# 2x10 2# B  2x10 2# L 0# R     Biodex Maze w/o AFO        NV x3 1UE x3 1UE   Biodex LOS w/o AFO        NV x3 1UE x3 1UE   Biodex WS        NV x2min R/L  x2min fwd/back 1uE x2min R/L  x2min fwd/back 1uE   Biodex Postural Stability         x2min floor 12  x2min floor 10  0UE biodex 2 min lvl 12  biodex 2 min lvl 10   Standing on foam w/o brace        NV NV 1 min biodex foam ea @ biodex                Ther Ex             Leg press      SL & DL 65# 2x10 ea SL & DL 65# 3x10   ea  DL 85# 3x10 ea  SL   65#  2x10     Standing hip ext       2x10 2# 2x10 2# B  2# x30 B     Standing hip abd     NV 2x10 2# B RTB 2x10 B 2# x30 B     TKE       NV Black TB x30     Passive stretch for R toes       x5min      R calf stretch w/ towel in long sit       3x30"                   Ther Activity             Mobility assessments   20'           Hurdles (fwd/lat)      NV x3 laps ea x3 laps ea     Side stepping      x5 laps 0UE x5 laps RTB 0UE x5 laps RTB 0UE                  Gait Training             Emphasis on R knee drive        [id number] w/ empasis on knee drive [id number] "quick  Steps" 4x30'  As overground reinforcement following TM gait train  2x50ft over ground after TM     TM         0  6mph 10' then 0 7mph 5min  Metronome: 51bpm occasional assitance for R knee flexion TM x10min 0 7mph 0% include   x5min0  7mph 4% incline  occasional manual facilitation of R knee flexion TM x10min 0 7mph 0% include   x5min0  7mph 4% incline  occasional manual facilitation of R knee flexion                Modalities

## 2022-09-29 ENCOUNTER — OFFICE VISIT (OUTPATIENT)
Dept: PHYSICAL THERAPY | Facility: CLINIC | Age: 72
End: 2022-09-29
Payer: COMMERCIAL

## 2022-09-29 DIAGNOSIS — Z87.898 HISTORY OF BRAIN TUMOR: ICD-10-CM

## 2022-09-29 DIAGNOSIS — G81.91 RIGHT HEMIPARESIS (HCC): Primary | ICD-10-CM

## 2022-09-29 DIAGNOSIS — Z74.09 IMPAIRED MOBILITY AND ENDURANCE: ICD-10-CM

## 2022-09-29 PROCEDURE — 97112 NEUROMUSCULAR REEDUCATION: CPT

## 2022-09-29 PROCEDURE — 97530 THERAPEUTIC ACTIVITIES: CPT

## 2022-09-29 PROCEDURE — 97110 THERAPEUTIC EXERCISES: CPT

## 2022-09-29 NOTE — PROGRESS NOTES
Daily Note     Today's date: 2022  Patient name: Kin Dakins  : 1950  MRN: [id number]  Referring provider: Rosi Hawthorne MD  Dx:   Encounter Diagnosis     ICD-10-CM    1  Right hemiparesis ([address] )  G81 91    2  Impaired mobility and endurance  Z74 09    3  History of brain tumor  Z87 898                   Subjective: Reports that she still hasn't tried NMES at home  Objective: See treatment diary below      Assessment: Tolerated treatment well  Patient demonstrated fatigue post treatment, exhibited good technique with therapeutic exercises and would benefit from continued PT  Pt forgot to bring sandals this visit, held balance exercises w/o brace  this  Re-initiated R quad, hip flexor, hip abductor and extensor strength to continue to address pt goals of mounting horse  Plan: Continue per plan of care        Precautions: Fall risk       9/29 8/4 8/9 8/11 8/15 9/8 9/12 9/14 9/19 9/26   Manuals                                                                 Neuro Re-Ed             Pt education on POC   SM SM    NMES for home and stretches for R calf & toes      Step ups   2x10 6" 2x10 6" 2x10 6" 2x10 6" fwd/lat Up & over 2# fwd only x30  Up & over fwd only x30       Step-downs      2x10 6"        STS   5x normal, 5x trialed RLE bias 10x with feet even 10x with feet even   x20 w/ feet even       Weighted step tap   2x10 8" with 4# 2x10 6" with 4# 2x10 6" w/ 4# 2x10 6" w/ 4# Black TB x20 R only      AAROM RLE eversion/DF with NMES   15'  15'  10' 10' 10'      Standing hamstring curls  2x10 B   2x10 alt  2x10 alt 2# 2x10 2# B  2x10 2# L 0# R     Biodex Maze w/o AFO        NV x3 1UE x3 1UE   Biodex LOS w/o AFO        NV x3 1UE x3 1UE   Biodex WS        NV x2min R/L  x2min fwd/back 1uE x2min R/L  x2min fwd/back 1uE   Biodex Postural Stability         x2min floor 12  x2min floor 10  0UE biodex 2 min lvl 12  biodex 2 min lvl 10   Standing on foam w/o brace        NV NV 1 min biodex foam ea @ biodex                Ther Ex             Leg press  # 3x10 ea  SL   85#  3x10    SL & DL 65# 2x10 ea SL & DL 65# 3x10   ea  DL 85# 3x10 ea  SL   65#  2x10     Standing hip ext  x30 B    2x10 2# 2x10 2# B  2# x30 B     Standing hip abd x30 B    NV 2x10 2# B RTB 2x10 B 2# x30 B     TKE Black TB x30      NV Black TB x30     Passive stretch for R toes       x5min      R calf stretch w/ towel in long sit       3x30"                   Ther Activity             Mobility assessments   20'           Hurdles (fwd/lat)      NV x3 laps ea x3 laps ea     Side stepping      x5 laps 0UE x5 laps RTB 0UE x5 laps RTB 0UE                  Gait Training             Emphasis on R knee drive  [id number] over ground after TM       2x30ft w/ empasis on knee drive [id number] "quick  Steps" 4x30'  As overground reinforcement following TM gait train  2x50ft over ground after TM     TM  TM x10min 0 7mph 0% include   x5min0  7mph 4% incline  occasional manual facilitation of R knee flexion       0  6mph 10' then 0 7mph 5min  Metronome: 51bpm occasional assitance for R knee flexion TM x10min 0 7mph 0% include   x5min0  7mph 4% incline  occasional manual facilitation of R knee flexion TM x10min 0 7mph 0% include   x5min0  7mph 4% incline  occasional manual facilitation of R knee flexion                Modalities

## 2022-10-03 ENCOUNTER — APPOINTMENT (OUTPATIENT)
Dept: PHYSICAL THERAPY | Facility: CLINIC | Age: 72
End: 2022-10-03

## 2022-10-05 ENCOUNTER — OFFICE VISIT (OUTPATIENT)
Dept: PHYSICAL THERAPY | Facility: CLINIC | Age: 72
End: 2022-10-05
Payer: COMMERCIAL

## 2022-10-05 DIAGNOSIS — Z74.09 IMPAIRED MOBILITY AND ENDURANCE: ICD-10-CM

## 2022-10-05 DIAGNOSIS — G81.91 RIGHT HEMIPARESIS (HCC): Primary | ICD-10-CM

## 2022-10-05 DIAGNOSIS — Z87.898 HISTORY OF BRAIN TUMOR: ICD-10-CM

## 2022-10-05 PROCEDURE — 97116 GAIT TRAINING THERAPY: CPT

## 2022-10-05 PROCEDURE — 97112 NEUROMUSCULAR REEDUCATION: CPT

## 2022-10-05 PROCEDURE — 97530 THERAPEUTIC ACTIVITIES: CPT

## 2022-10-05 NOTE — PROGRESS NOTES
Daily Note     Today's date: 10/5/2022  Patient name: Ericka Woody  : 1950  MRN: [id number]  Referring provider: Desirae De Souza MD  Dx:   Encounter Diagnosis     ICD-10-CM    1  Right hemiparesis ([address] )  G81 91    2  Impaired mobility and endurance  Z74 09    3  History of brain tumor  Z87 898                   Subjective: Pt w/ no new complaints  Reports compliance w/ active exercises, has not tried NMES at home yet  Objective: See treatment diary below      Assessment: Tolerated treatment well  First half of session focused on normalizing gait pattern on TM followed by balance interventions on Biodex w/o AFO  Pt continues w/ hesitancy to have balance tested in posterior and R lateral directions  Attempts to weight-shift posteriorly by bending knees and sticking out buttock; improved performance w/ tactile cueing  Patient demonstrated fatigue post treatment and would benefit from continued PT      Plan: Continue per plan of care        Precautions: Fall risk       9/29 10/5 8/9 8/11 8/15 9/8 9/12 9/14 9/19 9/26   Manuals                                                                 Neuro Re-Ed             Pt education on POC    SM    NMES for home and stretches for R calf & toes      Step ups    2x10 6" 2x10 6" 2x10 6" fwd/lat Up & over 2# fwd only x30  Up & over fwd only x30       Step-downs      2x10 6"        STS    10x with feet even 10x with feet even   x20 w/ feet even       Weighted step tap   2x10 8" with 4# 2x10 6" with 4# 2x10 6" w/ 4# 2x10 6" w/ 4# Black TB x20 R only      AAROM RLE eversion/DF with NMES   15'  15'  10' 10' 10'      Standing hamstring curls  2x10 B   2x10 alt  2x10 alt 2# 2x10 2# B  2x10 2# L 0# R     Biodex Maze w/o AFO  x1 1UE x2 0UE      NV x3 1UE x3 1UE   Biodex LOS w/o AFO  x1 1UE x2 0UE      NV x3 1UE x3 1UE   Biodex WS  Weight-bearing x2min 0UE      NV x2min R/L  x2min fwd/back 1uE x2min R/L  x2min fwd/back 1uE   Biodex Postural Stability   2 min lvl 12  biodex 2 min lvl 10  0UE       x2min floor 12  x2min floor 10  0UE biodex 2 min lvl 12  biodex 2 min lvl 10   Standing on foam w/o brace        NV NV 1 min biodex foam ea @ biodex                Ther Ex             Leg press  # 3x10 ea  SL   85#  3x10 # 3x10 ea  SL   85#  3x10   SL & DL 65# 2x10 ea SL & DL 65# 3x10   ea  DL 85# 3x10 ea  SL   65#  2x10     Standing hip ext  x30 B    2x10 2# 2x10 2# B  2# x30 B     Standing hip abd x30 B    NV 2x10 2# B RTB 2x10 B 2# x30 B     TKE Black TB x30      NV Black TB x30     Passive stretch for R toes       x5min      R calf stretch w/ towel in long sit       3x30"      Standing calf stretch (runner's stretch"  2x30" B w/o brace                        Ther Activity             Mobility assessments              Hurdles (fwd/lat)      NV x3 laps ea x3 laps ea     Side stepping      x5 laps 0UE x5 laps RTB 0UE x5 laps RTB 0UE                  Gait Training             Emphasis on R knee drive  [id number] over ground after TM       2x30ft w/ empasis on knee drive [id number] "quick  Steps" 4x30'  As overground reinforcement following TM gait train  2x50ft over ground after TM     TM  TM x10min 0 7mph 0% include   x5min0  7mph 4% incline  occasional manual facilitation of R knee flexion TM x10min 1 0mph 0% include   x5min1  0mph 4% incline  occasional manual facilitation of R knee flexion      0  6mph 10' then 0 7mph 5min  Metronome: 51bpm occasional assitance for R knee flexion TM x10min 0 7mph 0% include   x5min0  7mph 4% incline  occasional manual facilitation of R knee flexion TM x10min 0 7mph 0% include   x5min0  7mph 4% incline  occasional manual facilitation of R knee flexion                Modalities

## 2022-10-10 ENCOUNTER — APPOINTMENT (OUTPATIENT)
Dept: PHYSICAL THERAPY | Facility: CLINIC | Age: 72
End: 2022-10-10

## 2022-10-13 ENCOUNTER — APPOINTMENT (OUTPATIENT)
Dept: PHYSICAL THERAPY | Facility: CLINIC | Age: 72
End: 2022-10-13

## 2022-10-17 ENCOUNTER — OFFICE VISIT (OUTPATIENT)
Dept: PHYSICAL THERAPY | Facility: CLINIC | Age: 72
End: 2022-10-17
Payer: COMMERCIAL

## 2022-10-17 DIAGNOSIS — Z74.09 IMPAIRED MOBILITY AND ENDURANCE: ICD-10-CM

## 2022-10-17 DIAGNOSIS — G81.91 RIGHT HEMIPARESIS (HCC): Primary | ICD-10-CM

## 2022-10-17 DIAGNOSIS — Z87.898 HISTORY OF BRAIN TUMOR: ICD-10-CM

## 2022-10-17 PROCEDURE — 97112 NEUROMUSCULAR REEDUCATION: CPT

## 2022-10-17 PROCEDURE — 97116 GAIT TRAINING THERAPY: CPT

## 2022-10-17 PROCEDURE — 97110 THERAPEUTIC EXERCISES: CPT

## 2022-10-17 NOTE — PROGRESS NOTES
Daily Note     Today's date: 10/17/2022  Patient name: Lucretia Yañez  : 1950  MRN: [id number]  Referring provider: Jarred Willoughby MD  Dx:   Encounter Diagnosis     ICD-10-CM    1  Right hemiparesis ([address] )  G81 91    2  Impaired mobility and endurance  Z74 09    3  History of brain tumor  Z87 898               Subjective: No new complaints at this time  Objective: See treatment diary below      Assessment: Pt tolerated treatment well as demonstrated by decreased facilitation needed on TM, and able to improve gait pattern with VC  Pt continues to be hesitant balance training with posterior and R lateral weight shifting, however, able to correct with verbal and tactile cueing with appropriate technique  Patient demonstrated fatigue post treatment and would benefit from continued PT  Plan: Continue per plan of care        Precautions: Fall risk       9/29 10/5 10/17  8/15 9/8 9/12 9/14 9/19 9/26   Manuals                                                                 Neuro Re-Ed             Pt education on POC    EB    NMES for home and stretches for R calf & toes      Step ups      2x10 6" fwd/lat Up & over 2# fwd only x30  Up & over fwd only x30       Step-downs      2x10 6"        STS       x20 w/ feet even       Weighted step tap     2x10 6" w/ 4# 2x10 6" w/ 4# Black TB x20 R only      AAROM RLE eversion/DF with NMES     10' 10' 10'      Standing hamstring curls  2x10 B    2x10 alt 2# 2x10 2# B  2x10 2# L 0# R     Biodex Maze w/o AFO  x1 1UE x2 0UE x3 1UE     NV x3 1UE x3 1UE   Biodex LOS w/o AFO  x1 1UE x2 0UE x3 1UE     NV x3 1UE x3 1UE   Biodex WS  Weight-bearing x2min 0UE X3 2 min 0UE     NV x2min R/L  x2min fwd/back 1uE x2min R/L  x2min fwd/back 1uE   Biodex Postural Stability   2 min lvl 12  biodex 2 min lvl 10  0UE x3 Lvl 11 1UE      x2min floor 12  x2min floor 10  0UE biodex 2 min lvl 12  biodex 2 min lvl 10   Standing on foam w/o brace        NV NV 1 min biodex foam ea @ biodex Ther Ex             Leg press  # 3x10 ea  SL   85#  3x10 # 3x10 ea  SL   85#  3x10 #  3x10 ea  SL 85# 3x10  SL & DL 65# 2x10 ea SL & DL 65# 3x10   ea  DL 85# 3x10 ea  SL   65#  2x10     Standing hip ext  x30 B    2x10 2# 2x10 2# B  2# x30 B     Standing hip abd x30 B    NV 2x10 2# B RTB 2x10 B 2# x30 B     TKE Black TB x30      NV Black TB x30     Passive stretch for R toes       x5min      R calf stretch w/ towel in long sit       3x30"      Standing calf stretch (runner's stretch"  2x30" B w/o brace 4x 20" w/o brace                       Ther Activity             Mobility assessments              Hurdles (fwd/lat)      NV x3 laps ea x3 laps ea     Side stepping      x5 laps 0UE x5 laps RTB 0UE x5 laps RTB 0UE                  Gait Training             Emphasis on R knee drive  [id number] over ground after TM       2x30ft w/ empasis on knee drive [id number] "quick  Steps" 4x30'  As overground reinforcement following TM gait train  2x50ft over ground after TM     TM  TM x10min 0 7mph 0% include   x5min0  7mph 4% incline  occasional manual facilitation of R knee flexion TM x10min 1 0mph 0% include   x5min1  0mph 4% incline  occasional manual facilitation of R knee flexion TM x10 1 0 mph including 2% incline VC for R knee flexion     0  6mph 10' then 0 7mph 5min  Metronome: 51bpm occasional assitance for R knee flexion TM x10min 0 7mph 0% include   x5min0  7mph 4% incline  occasional manual facilitation of R knee flexion TM x10min 0 7mph 0% include   x5min0  7mph 4% incline  occasional manual facilitation of R knee flexion                Modalities

## 2022-10-18 DIAGNOSIS — Z91.041 CONTRAST MEDIA ALLERGY: Primary | ICD-10-CM

## 2022-10-18 RX ORDER — PREDNISONE 50 MG/1
TABLET ORAL
Qty: 3 TABLET | Refills: 0 | Status: SHIPPED | OUTPATIENT
Start: 2022-10-18

## 2022-10-18 NOTE — TELEPHONE ENCOUNTER
Received a call from Miguelina Choi regarding contrast prep and wondering if she still needs it  Advised she will still need the prep  Placed these orders to be eRx to confirmed pharmacy  She was appreciative

## 2022-10-20 ENCOUNTER — OFFICE VISIT (OUTPATIENT)
Dept: PHYSICAL THERAPY | Facility: CLINIC | Age: 72
End: 2022-10-20
Payer: COMMERCIAL

## 2022-10-20 DIAGNOSIS — Z74.09 IMPAIRED MOBILITY AND ENDURANCE: ICD-10-CM

## 2022-10-20 DIAGNOSIS — G81.91 RIGHT HEMIPARESIS (HCC): Primary | ICD-10-CM

## 2022-10-20 DIAGNOSIS — Z87.898 HISTORY OF BRAIN TUMOR: ICD-10-CM

## 2022-10-20 PROCEDURE — 97110 THERAPEUTIC EXERCISES: CPT

## 2022-10-20 PROCEDURE — 97112 NEUROMUSCULAR REEDUCATION: CPT

## 2022-10-20 PROCEDURE — 97530 THERAPEUTIC ACTIVITIES: CPT

## 2022-10-20 NOTE — PROGRESS NOTES
Daily Note     Today's date: 10/20/2022  Patient name: Bruce Ruiz  : 1950  MRN: [id number]  Referring provider: Tung Mendoza MD  Dx:   Encounter Diagnosis     ICD-10-CM    1  Right hemiparesis ([address] )  G81 91    2  Impaired mobility and endurance  Z74 09    3  History of brain tumor  Z87 898                   Subjective: Pt reports no complaints today  Objective: See treatment diary below      Assessment: Tolerated treatment well  Good effort noted with all balance exercises during session  She tolerates re-integration of step ups with R LE lead, fwd and lat  Trialled no UE support on biodex for weight shifting  Patient demonstrated fatigue post treatment and would benefit from continued PT      Plan: Continue per plan of care        Precautions: Fall risk       9/29 10/5 10/17 10/20  9/8 9/12 9/14 9/19 9/26   Manuals                                                                 Neuro Re-Ed             Pt education on POC    EB    NMES for home and stretches for R calf & toes      Step ups     6" 2x10 R LE up L LE down, fwd and lat  Up & over 2# fwd only x30  Up & over fwd only x30       Step-downs              STS       x20 w/ feet even       Weighted step tap      2x10 6" w/ 4# Black TB x20 R only      AAROM RLE eversion/DF with NMES      10' 10'      Standing hamstring curls  2x10 B     2x10 2# B  2x10 2# L 0# R     Biodex Maze w/o AFO  x1 1UE x2 0UE x3 1UE x3 1UE    NV x3 1UE x3 1UE   Biodex LOS w/o AFO  x1 1UE x2 0UE x3 1UE x3 1UE    NV x3 1UE x3 1UE   Biodex WS  Weight-bearing x2min 0UE X3 2 min 0UE X3 2 min 0UE    NV x2min R/L  x2min fwd/back 1uE x2min R/L  x2min fwd/back 1uE   Biodex Postural Stability   2 min lvl 12  biodex 2 min lvl 10  0UE x3 Lvl 11 1UE x2 min Lvl 11 0UE     x2min floor 12  x2min floor 10  0UE biodex 2 min lvl 12  biodex 2 min lvl 10   Standing on foam w/o brace        NV NV 1 min biodex foam ea @ biodex                Ther Ex             Leg press  # 3x10 ea  SL   85#  3x10 # 3x10 ea  SL   85#  3x10 #  3x10 ea  SL 85# 3x10 #  3x10 ea  SL  85# 3x10  SL & DL 65# 3x10   ea  DL 85# 3x10 ea  SL   65#  2x10     Standing hip ext  x30 B     2x10 2# B  2# x30 B     Standing hip abd x30 B     2x10 2# B RTB 2x10 B 2# x30 B     TKE Black TB x30      NV Black TB x30     Passive stretch for R toes       x5min      R calf stretch w/ towel in long sit       3x30"      Standing calf stretch (runner's stretch"  2x30" B w/o brace 4x 20" w/o brace 4x 20" w/o brace                      Ther Activity             Mobility assessments              Hurdles (fwd/lat)      NV x3 laps ea x3 laps ea     Side stepping      x5 laps 0UE x5 laps RTB 0UE x5 laps RTB 0UE                  Gait Training             Emphasis on R knee drive  [id number] over ground after TM       2x30ft w/ empasis on knee drive [id number] "quick  Steps" 4x30'  As overground reinforcement following TM gait train  2x50ft over ground after TM     TM  TM x10min 0 7mph 0% include   x5min0  7mph 4% incline  occasional manual facilitation of R knee flexion TM x10min 1 0mph 0% include   x5min1  0mph 4% incline  occasional manual facilitation of R knee flexion TM x10 1 0 mph including 2% incline VC for R knee flexion TM x10 1 0 mph including 2% incline VC for R knee flexion    0  6mph 10' then 0 7mph 5min  Metronome: 51bpm occasional assitance for R knee flexion TM x10min 0 7mph 0% include   x5min0  7mph 4% incline  occasional manual facilitation of R knee flexion TM x10min 0 7mph 0% include   x5min0  7mph 4% incline  occasional manual facilitation of R knee flexion                Modalities

## 2022-10-27 ENCOUNTER — OFFICE VISIT (OUTPATIENT)
Dept: PHYSICAL THERAPY | Facility: CLINIC | Age: 72
End: 2022-10-27
Payer: COMMERCIAL

## 2022-10-27 DIAGNOSIS — G81.91 RIGHT HEMIPARESIS (HCC): Primary | ICD-10-CM

## 2022-10-27 DIAGNOSIS — Z74.09 IMPAIRED MOBILITY AND ENDURANCE: ICD-10-CM

## 2022-10-27 DIAGNOSIS — Z87.898 HISTORY OF BRAIN TUMOR: ICD-10-CM

## 2022-10-27 PROCEDURE — 97110 THERAPEUTIC EXERCISES: CPT

## 2022-10-27 PROCEDURE — 97530 THERAPEUTIC ACTIVITIES: CPT

## 2022-10-27 PROCEDURE — 97112 NEUROMUSCULAR REEDUCATION: CPT

## 2022-10-27 NOTE — PROGRESS NOTES
Daily Note     Today's date: 10/27/2022  Patient name: Becki Latif  : 1950  MRN: [id number]  Referring provider: Luis Enrique Triana MD  Dx:   Encounter Diagnosis     ICD-10-CM    1  Right hemiparesis ([address] )  G81 91    2  Impaired mobility and endurance  Z74 09    3  History of brain tumor  Z87 898                   Subjective: Pt w/ no new complaints  Objective: See treatment diary below      Assessment: Tolerated treatment well  Session focused primarily on functional strengthening and gait quality  AFO donned for entire session, NBQC only utilized on indicated exercises  Pt w/ reports of increased R "toe curling" and "ankle trying to turn in" w/o use of NBQC, likely due to overactive recruitment for stability  No overt LOB this session  Patient demonstrated fatigue post treatment, exhibited good technique with therapeutic exercises and would benefit from continued PT      Plan: Continue per plan of care        Precautions: Fall risk       9/29 10/5 10/17 10/20 10/27  9/12 9/14 9/19 9/26   Manuals                                                                 Neuro Re-Ed             Pt education on POC    EB    NMES for home and stretches for R calf & toes      Step ups     6" 2x10 R LE up L LE down, fwd and lat   Up & over fwd only x30       Step-downs              STS              Weighted step tap       Black TB x20 R only      AAROM RLE eversion/DF with NMES       10'      Standing hamstring curls  2x10 B    NV   2x10 2# L 0# R     Biodex Maze w/o AFO  x1 1UE x2 0UE x3 1UE x3 1UE    NV x3 1UE x3 1UE   Biodex LOS w/o AFO  x1 1UE x2 0UE x3 1UE x3 1UE    NV x3 1UE x3 1UE   Biodex WS  Weight-bearing x2min 0UE X3 2 min 0UE X3 2 min 0UE    NV x2min R/L  x2min fwd/back 1uE x2min R/L  x2min fwd/back 1uE   Biodex Postural Stability   2 min lvl 12  biodex 2 min lvl 10  0UE x3 Lvl 11 1UE x2 min Lvl 11 0UE     x2min floor 12  x2min floor 10  0UE biodex 2 min lvl 12  biodex 2 min lvl 10   Standing on foam w/o brace        NV NV 1 min biodex foam ea @ biodex   HKM     x5 laps on line w/ cane                     Ther Ex             Leg press  # 3x10 ea  SL   85#  3x10 # 3x10 ea  SL   85#  3x10 #  3x10 ea  SL 85# 3x10 #  3x10 ea  SL  85# 3x10 #  3x10 ea  SL  85# 3x10   DL 85# 3x10 ea  SL   65#  2x10     Standing hip ext  x30 B    HEP   2# x30 B     Standing hip abd x30 B    HEP  RTB 2x10 B 2# x30 B     TKE Black TB x30    Black TB x30  NV Black TB x30     Passive stretch for R toes       x5min      R calf stretch w/ towel in long sit       3x30"      Standing calf stretch (runner's stretch"  2x30" B w/o brace 4x 20" w/o brace 4x 20" w/o brace                      Ther Activity             Mobility assessments              Hurdles (fwd/lat)       x3 laps ea x3 laps ea     Side stepping     YTB x3 laps on line  x5 laps RTB 0UE x5 laps RTB 0UE     Resisted ambulation @ [place]     10# fwd & back x5 laps ea                     Gait Training             Emphasis on R knee drive  [id number] over ground after TM       2x30ft w/ empasis on knee drive [id number] "quick  Steps" 4x30'  As overground reinforcement following TM gait train  2x50ft over ground after TM     TM  TM x10min 0 7mph 0% include   x5min0  7mph 4% incline  occasional manual facilitation of R knee flexion TM x10min 1 0mph 0% include   x5min1  0mph 4% incline  occasional manual facilitation of R knee flexion TM x10 1 0 mph including 2% incline VC for R knee flexion TM x10 1 0 mph including 2% incline VC for R knee flexion TM x10 1 0 mph including 2% incline VC for R knee flexion   0  6mph 10' then 0 7mph 5min  Metronome: 51bpm occasional assitance for R knee flexion TM x10min 0 7mph 0% include   x5min0  7mph 4% incline  occasional manual facilitation of R knee flexion TM x10min 0 7mph 0% include   x5min0  7mph 4% incline  occasional manual facilitation of R knee flexion   Stairs      x2 flights R HR ascending                     Modalities

## 2022-11-01 ENCOUNTER — NURSE TRIAGE (OUTPATIENT)
Dept: OTHER | Facility: OTHER | Age: 72
End: 2022-11-01

## 2022-11-01 ENCOUNTER — APPOINTMENT (OUTPATIENT)
Dept: PHYSICAL THERAPY | Facility: CLINIC | Age: 72
End: 2022-11-01

## 2022-11-01 DIAGNOSIS — Z91.041 CONTRAST MEDIA ALLERGY: ICD-10-CM

## 2022-11-01 DIAGNOSIS — Z91.041 CONTRAST MEDIA ALLERGY: Primary | ICD-10-CM

## 2022-11-01 RX ORDER — PREDNISONE 50 MG/1
50 TABLET ORAL ONCE
Qty: 1 TABLET | Refills: 0 | Status: SHIPPED | OUTPATIENT
Start: 2022-11-01 | End: 2022-11-01

## 2022-11-01 RX ORDER — PREDNISONE 50 MG/1
TABLET ORAL
Qty: 3 TABLET | Refills: 0 | Status: SHIPPED | OUTPATIENT
Start: 2022-11-01 | End: 2022-11-11 | Stop reason: SDUPTHER

## 2022-11-01 NOTE — TELEPHONE ENCOUNTER
Contacted that patient had mistakenly taken 1 tablet of 50 mg Prednisone that they were supposed to take tomorrow in preparation for the MRI with contrast due to contrast allergy  Ordered additional prednisone tablet to take as directed prior to [facility] tomorrow

## 2022-11-01 NOTE — TELEPHONE ENCOUNTER
Reason for Disposition  • [1] Caller requesting NON-URGENT health information AND [2] PCP's office is the best resource    Answer Assessment - Initial Assessment Questions  1  REASON FOR CALL or QUESTION: "What is your reason for calling today?" or "How can I best help you?" or "What question do you have that I can help answer?"      Accidentally took Prednisone 50 mg today instead of 3:30 AM as prescribed for contast media allergy for MRI scheduled to 11/2 a 4:30 PM  Do I need another tablet?     Protocols used: INFORMATION ONLY CALL - NO TRIAGE[facility]

## 2022-11-01 NOTE — TELEPHONE ENCOUNTER
Pt called in stating she mistakenly took a pill today so she needs it replaced in order to take it properly for her MRI on 11/2

## 2022-11-01 NOTE — TELEPHONE ENCOUNTER
Patient called in to report she accidentally took Prednisone 50 mg today at 3:30 PM instead of 3:30 AM on 11/2 in preparation for MRI scheduled 11/2 at 4:30 PM and allergy to contrast media  Reached out to on call provider who will put in prescription for additional table of Prednisone 50 mg so patient can follow administration schedule for medication  Advised patient to call pharmacy in the AM to confirm prescription  If not there, call neurology office  Please follow up with patient to confirm prescription sent  Regarding: Prednisone advice  ----- Message from Edin Paredes sent at 11/1/2022  5:01 PM EDT -----  "I was prescribed 3 Prednisone pills for my MRI tomorrow, but I took one of them today by mistake   Is this ok?"

## 2022-11-03 ENCOUNTER — OFFICE VISIT (OUTPATIENT)
Dept: PHYSICAL THERAPY | Facility: CLINIC | Age: 72
End: 2022-11-03

## 2022-11-03 DIAGNOSIS — Z87.898 HISTORY OF BRAIN TUMOR: ICD-10-CM

## 2022-11-03 DIAGNOSIS — Z74.09 IMPAIRED MOBILITY AND ENDURANCE: ICD-10-CM

## 2022-11-03 DIAGNOSIS — G81.91 RIGHT HEMIPARESIS (HCC): Primary | ICD-10-CM

## 2022-11-03 NOTE — PROGRESS NOTES
Daily Note     Today's date: 11/3/2022  Patient name: Leni Rdoríguez  : 1950  MRN: [id number]  Referring provider: Marco Sellers MD  Dx:   Encounter Diagnosis     ICD-10-CM    1  Right hemiparesis ([address] )  G81 91    2  Impaired mobility and endurance  Z74 09    3  History of brain tumor  Z87 898      Start Time: 1630  Stop Time: 1715  Total time in clinic (min): 45 minutes    Subjective: She was scheduled for an MRI this week however it had to be rescheduled to next week  Pt reports nothing else new  Objective: See treatment diary below      Assessment: Pt tolerated treatment well  Able to continue funcitonal mobility training, noted inc cueing needed for knee flexion with gait training to prevent circumduction  Patient exhibited good technique with therapeutic exercises and would benefit from continued PT to improve functional mobility independence and improve QOL  Plan: Continue per plan of care        Precautions: Fall risk       9/29 10/5 10/17 10/20 10/27 11/3  9/14 9/19 9/26   Manuals                                                                 Neuro Re-Ed             Pt education on POC    EB   EB, orthotic, footwear       Step ups     6" 2x10 R LE up L LE down, fwd and lat         Step-downs              STS              Weighted step tap             AAROM RLE eversion/DF with NMES             Standing hamstring curls  2x10 B    NV 2x10 B/L  2x10 2# L 0# R     Biodex Maze w/o AFO  x1 1UE x2 0UE x3 1UE x3 1UE    NV x3 1UE x3 1UE   Biodex LOS w/o AFO  x1 1UE x2 0UE x3 1UE x3 1UE    NV x3 1UE x3 1UE   Biodex WS  Weight-bearing x2min 0UE X3 2 min 0UE X3 2 min 0UE    NV x2min R/L  x2min fwd/back 1uE x2min R/L  x2min fwd/back 1uE   Biodex Postural Stability   2 min lvl 12  biodex 2 min lvl 10  0UE x3 Lvl 11 1UE x2 min Lvl 11 0UE     x2min floor 12  x2min floor 10  0UE biodex 2 min lvl 12  biodex 2 min lvl 10   Standing on foam w/o brace        NV NV 1 min biodex foam ea @ biodex   HKM x5 laps on line w/ cane x5 laps 20' w/ cane and visual cueing to prevent IR of RLE       Resisted ambulation @ [place]      10# fwd & back x5 laps ea       Ther Ex             Leg press  # 3x10 ea  SL   85#  3x10 # 3x10 ea  SL   85#  3x10 #  3x10 ea  SL 85# 3x10 #  3x10 ea  SL  85# 3x10 #  3x10 ea  SL  85# 3x10 #  3x10 ea  SL  3x10  DL 85# 3x10 ea  SL   65#  2x10     Standing hip ext  x30 B    HEP   2# x30 B     Standing hip abd x30 B    HEP   2# x30 B     TKE Black TB x30    Black TB x30   Black TB x30     Passive stretch for R toes             R calf stretch w/ towel in long sit             Standing calf stretch (runner's stretch"  2x30" B w/o brace 4x 20" w/o brace 4x 20" w/o brace                      Ther Activity             Mobility assessments              Hurdles (fwd/lat)        x3 laps ea     Side stepping     YTB x3 laps on line   x5 laps RTB 0UE     Resisted ambulation @ [place]     10# fwd & back x5 laps ea                      Gait Training             Emphasis on R knee drive  [id number] over ground after TM        4x30'  As overground reinforcement following TM gait train  2x50ft over ground after TM     TM  TM x10min 0 7mph 0% include   x5min0  7mph 4% incline  occasional manual facilitation of R knee flexion TM x10min 1 0mph 0% include   x5min1  0mph 4% incline  occasional manual facilitation of R knee flexion TM x10 1 0 mph including 2% incline VC for R knee flexion TM x10 1 0 mph including 2% incline VC for R knee flexion TM x10 1 0 mph including 2% incline VC for R knee flexion TM x15' 1 0mph including 2% incline verbal and tactilecues for knee flexion  0  6mph 10' then 0 7mph 5min  Metronome: 51bpm occasional assitance for R knee flexion TM x10min 0 7mph 0% include   x5min0  7mph 4% incline  occasional manual facilitation of R knee flexion TM x10min 0 7mph 0% include   x5min0  7mph 4% incline  occasional manual facilitation of R knee flexion   Stairs      x2 flights R HR ascending x3 flights R HR ascending                    Modalities

## 2022-11-07 ENCOUNTER — OFFICE VISIT (OUTPATIENT)
Dept: PHYSICAL THERAPY | Facility: CLINIC | Age: 72
End: 2022-11-07

## 2022-11-07 DIAGNOSIS — Z87.898 HISTORY OF BRAIN TUMOR: ICD-10-CM

## 2022-11-07 DIAGNOSIS — Z74.09 IMPAIRED MOBILITY AND ENDURANCE: ICD-10-CM

## 2022-11-07 DIAGNOSIS — G81.91 RIGHT HEMIPARESIS (HCC): Primary | ICD-10-CM

## 2022-11-07 NOTE — PROGRESS NOTES
Daily Note     Today's date: 2022  Patient name: Kandi Kendall  : 1950  MRN: [id number]  Referring provider: Jackson Essex, MD  Dx:   Encounter Diagnosis     ICD-10-CM    1  Right hemiparesis ([facility] [address] )  G81 91    2  Impaired mobility and endurance  Z74 09    3  History of brain tumor  Z87 898      Start Time: 1630  Stop Time: 1715  Total time in clinic (min): 45 minutes    Subjective: Feeling a little tired today, did a lot of cleaning yesterday  She reports she has a busy week coming up  Objective: See treatment diary below    Assessment: Pt tolerated treatment well  Initiated PNF on the low mat table per patient request  Pt demonstrated good initiation and contraction of RLE  Continued difficulty with DF resulting in inversion of R foot with fatigue, even with orthotic on  Remainder of session focused on LE strengthening and education on prognosis/POC  Patient would benefit from continued PT    Plan: Continue per plan of care        Precautions: Fall risk     9/29 10/5 10/17 10/20 10/27 11/3 11/7  9/19 9/26   Manuals                                                                 Neuro Re-Ed             Pt education on POC    EB   EB, orthotic, footwear EB      Step ups     6" 2x10 R LE up L LE down, fwd and lat         Step-downs        NV      STS              Weighted step tap       NV      AAROM RLE eversion/DF with NMES             Standing hamstring curls  2x10 B    NV 2x10 B/L 2x10 B/L      Biodex Maze w/o AFO  x1 1UE x2 0UE x3 1UE x3 1UE     x3 1UE x3 1UE   Biodex LOS w/o AFO  x1 1UE x2 0UE x3 1UE x3 1UE     x3 1UE x3 1UE   Biodex WS  Weight-bearing x2min 0UE X3 2 min 0UE X3 2 min 0UE     x2min R/L  x2min fwd/back 1uE x2min R/L  x2min fwd/back 1uE   Biodex Postural Stability   2 min lvl 12  biodex 2 min lvl 10  0UE x3 Lvl 11 1UE x2 min Lvl 11 0UE     x2min floor 12  x2min floor 10  0UE biodex 2 min lvl 12  biodex 2 min lvl 10   Standing on foam w/o brace         NV 1 min biodex foam ea @ biodex   HKM     x5 laps on line w/ cane x5 laps 20' w/ cane and visual cueing to prevent IR of RLE 5 laps      Resisted ambulation @ [place]      10# fwd & back x5 laps ea       PNF       3x10 D2  3x10 D1 3x10 D2  3x10 D1      Ther Ex             Leg press  # 3x10 ea  SL   85#  3x10 # 3x10 ea  SL   85#  3x10 #  3x10 ea  SL 85# 3x10 #  3x10 ea  SL  85# 3x10 #  3x10 ea  SL  85# 3x10 #  3x10 ea  SL  3x10 # 3x10, # 3x10      Standing hip ext  x30 B    HEP        Standing hip abd x30 B    HEP        TKE Black TB x30    Black TB x30        Passive stretch for R toes             R calf stretch w/ towel in long sit             Standing calf stretch (runner's stretch"  2x30" B w/o brace 4x 20" w/o brace 4x 20" w/o brace   4x30"                   Ther Activity             Mobility assessments              Hurdles (fwd/lat)             Side stepping     YTB x3 laps on line        Resisted ambulation @ [place]     10# fwd & back x5 laps ea                      Gait Training             Emphasis on R knee drive  [id number] over ground after TM          2x50ft over ground after TM     TM  TM x10min 0 7mph 0% include   x5min0  7mph 4% incline  occasional manual facilitation of R knee flexion TM x10min 1 0mph 0% include   x5min1  0mph 4% incline  occasional manual facilitation of R knee flexion TM x10 1 0 mph including 2% incline VC for R knee flexion TM x10 1 0 mph including 2% incline VC for R knee flexion TM x10 1 0 mph including 2% incline VC for R knee flexion TM x15' 1 0mph including 2% incline verbal and tactilecues for knee flexion TM x10' 1 0mph including 2% incline verbal and tactilecues for knee flexion  TM x10min 0 7mph 0% include   x5min0  7mph 4% incline  occasional manual facilitation of R knee flexion TM x10min 0 7mph 0% include   x5min0  7mph 4% incline  occasional manual facilitation of R knee flexion   Stairs      x2 flights R HR ascending x3 flights R HR ascending x4 flights R HR ascending                   Modalities

## 2022-11-08 DIAGNOSIS — Z91.041 CONTRAST MEDIA ALLERGY: Primary | ICD-10-CM

## 2022-11-08 DIAGNOSIS — T50.8X5S: ICD-10-CM

## 2022-11-08 RX ORDER — PREDNISONE 50 MG/1
TABLET ORAL
Qty: 1 TABLET | Refills: 0 | Status: SHIPPED | OUTPATIENT
Start: 2022-11-08 | End: 2022-11-11 | Stop reason: SDUPTHER

## 2022-11-08 NOTE — TELEPHONE ENCOUNTER
Received a call from Meghna De Los Santos reporting she took her prednisone for MRI prep this morning instead of waiting until 9pm  Requesting 1 tab be eRx to her pharmacy  Placed this order with the approval of BENY PORRAS  Patient aware of order being sent to pharmacy  She was appreciative

## 2022-11-09 ENCOUNTER — HOSPITAL ENCOUNTER (OUTPATIENT)
Dept: MRI IMAGING | Facility: HOSPITAL | Age: 72
Discharge: HOME | End: 2022-11-09

## 2022-11-09 DIAGNOSIS — D32.9 BENIGN MENINGIOMA (HCC): ICD-10-CM

## 2022-11-09 RX ORDER — GADOTERATE MEGLUMINE 376.9 MG/ML
14 INJECTION INTRAVENOUS
Status: COMPLETED | OUTPATIENT
Start: 2022-11-09 | End: 2022-11-09

## 2022-11-09 RX ADMIN — GADOTERATE MEGLUMINE 14 ML: 376.9 INJECTION INTRAVENOUS at 11:15

## 2022-11-10 ENCOUNTER — OFFICE VISIT (OUTPATIENT)
Dept: PHYSICAL THERAPY | Facility: CLINIC | Age: 72
End: 2022-11-10

## 2022-11-10 DIAGNOSIS — Z87.898 HISTORY OF BRAIN TUMOR: ICD-10-CM

## 2022-11-10 DIAGNOSIS — Z74.09 IMPAIRED MOBILITY AND ENDURANCE: Primary | ICD-10-CM

## 2022-11-10 DIAGNOSIS — G81.91 RIGHT HEMIPARESIS (HCC): ICD-10-CM

## 2022-11-10 NOTE — PROGRESS NOTES
Daily Note     Today's date: 11/10/2022  Patient name: Joshua Chew  : 1950  MRN: [id number]  Referring provider: Sam Jasso MD  Dx:   Encounter Diagnosis     ICD-10-CM    1  Impaired mobility and endurance  Z74 09    2  Right hemiparesis ([facility] [address] )  G81 91    3  History of brain tumor  Z87 898      Start Time: 1630  Stop Time: 1715  Total time in clinic (min): 45 minutes    Subjective: At beginning of session pt reports she had her MRI yesterday and goes tomorrow to receive her results  Nothing new to report at this time  After controlled fall, pt reports her leg feeling heavier today, possibly due to medication from MRI which she has had a reaction to in the past  She reports she took medication prior to MRI and suspect her R side weakness is due to the medication  Objective: See treatment diary below    Vitals:   PRE BP: 121/72, HR:63 bpm  POST /73, HR 60bpm       11/10   FGA     6 Minute Walk Test (ft):  633 ft (SPO2 98%, HR 97 bpm)  329, LOB at 4 min SPC   Gait Speed (m/s): Self selected: / 9s = 0 47 m/s  Fast:  5s = 0 52 m/s     5x Sit To Stand (s): 17 05s     TUG: With SPC: 19 9   Without SPC: 29s        Assessment: At the beginning of session, pt educated of re-evaluation needed  Pt agreeable to re-eval today  Pt vitals WFL before tx initiation  Pt began endurance testing 6MWT with SPC  At 4 min asim, pt had sig LOB and pt was assisted in controlled fall to the ground  Pt reported her foot was turning inward pt was struggling to clear ground  Pt reported no injuries, and denied dizziness  Pt assisted to standing and then to sitting in chair  Pt vitals WFL  Pt lower AFO strap noted to be undone, pt reports putting own AFO own beforehand and unsure if it was strapped  Pt able to continue tx session primarily focusing on balance strategies  Pt reported no pain at end of session  Pt escorted outside to  and educated on fall   Pt suspects R sided weakness due to MRI contrast reaction  Patient and  educated on if continued R sided weakness/progression to call/go to PCP/ED  Pt agreeable  Pt to see neurosurgeon tomorrow for MRI results  Pt tolerated treatment fair  Patient would benefit from continued PT  Plan: Continue per plan of care  Next session trial floor transfer and continued fall safety  Precautions: Fall risk   9/29 10/5 10/17 10/20 10/27 11/3 11/7 11/10  9/26   Manuals                                                                 Neuro Re-Ed             Pt education on POC    EB   EB, orthotic, footwear EB EB, orthotic     Step ups     6" 2x10 R LE up L LE down, fwd and lat         Step-downs        NV      STS              Weighted step tap       NV      AAROM RLE eversion/DF with NMES             Standing hamstring curls  2x10 B    NV 2x10 B/L 2x10 B/L seated with YTB 2x10 B/L      Biodex Maze w/o AFO  x1 1UE x2 0UE x3 1UE x3 1UE    x3 1UE  x3 1UE   Biodex LOS w/o AFO  x1 1UE x2 0UE x3 1UE x3 1UE    x3 1UE  x3 1UE   Biodex WS  Weight-bearing x2min 0UE X3 2 min 0UE X3 2 min 0UE    X3 2 min 1UE  x2min R/L  x2min fwd/back 1uE   Biodex Postural Stability   2 min lvl 12  biodex 2 min lvl 10  0UE x3 Lvl 11 1UE x2 min Lvl 11 0UE    x2 min Lvl 11 1UE  biodex 2 min lvl 12  biodex 2 min lvl 10   Standing on foam w/o brace          1 min biodex foam ea @ biodex   HKM     x5 laps on line w/ cane x5 laps 20' w/ cane and visual cueing to prevent IR of RLE 5 laps      Resisted ambulation @ [place]      10# fwd & back x5 laps ea       PNF       3x10 D2  3x10 D1 3x10 D2  3x10 D1      Ther Ex             Leg press  # 3x10 ea  SL   85#  3x10 # 3x10 ea  SL   85#  3x10 #  3x10 ea  SL 85# 3x10 #  3x10 ea  SL  85# 3x10 #  3x10 ea  SL  85# 3x10 #  3x10 ea  SL  3x10 # 3x10, # 3x10      Standing hip ext   x30 B    HEP        Standing hip abd x30 B    HEP        TKE Black TB x30    Black TB x30        Passive stretch for R toes R calf stretch w/ towel in long sit             Standing calf stretch (runner's stretch"  2x30" B w/o brace 4x 20" w/o brace 4x 20" w/o brace   4x30"                   Ther Activity             Mobility assessments              Hurdles (fwd/lat)             Side stepping     YTB x3 laps on line        Resisted ambulation @ [place]     10# fwd & back x5 laps ea         Floor transfer        NV     Vitals        EB     Gait Training             Emphasis on R knee drive  [id number] over ground after TM             TM  TM x10min 0 7mph 0% include   x5min0  7mph 4% incline  occasional manual facilitation of R knee flexion TM x10min 1 0mph 0% include   x5min1  0mph 4% incline  occasional manual facilitation of R knee flexion TM x10 1 0 mph including 2% incline VC for R knee flexion TM x10 1 0 mph including 2% incline VC for R knee flexion TM x10 1 0 mph including 2% incline VC for R knee flexion TM x15' 1 0mph including 2% incline verbal and tactilecues for knee flexion TM x10' 1 0mph including 2% incline verbal and tactilecues for knee flexion   TM x10min 0 7mph 0% include   x5min0  7mph 4% incline  occasional manual facilitation of R knee flexion   Stairs      x2 flights R HR ascending x3 flights R HR ascending x4 flights R HR ascending                   Modalities

## 2022-11-11 ENCOUNTER — OFFICE VISIT (OUTPATIENT)
Dept: NEUROSURGERY | Facility: CLINIC | Age: 72
End: 2022-11-11

## 2022-11-11 VITALS
DIASTOLIC BLOOD PRESSURE: 78 MMHG | HEIGHT: 65 IN | SYSTOLIC BLOOD PRESSURE: 122 MMHG | TEMPERATURE: 98 F | RESPIRATION RATE: 14 BRPM | HEART RATE: 64 BPM | WEIGHT: 168 LBS | BODY MASS INDEX: 27.99 KG/M2

## 2022-11-11 DIAGNOSIS — Z91.041 CONTRAST MEDIA ALLERGY: ICD-10-CM

## 2022-11-11 DIAGNOSIS — D32.9 BENIGN MENINGIOMA (HCC): Primary | ICD-10-CM

## 2022-11-11 RX ORDER — PREDNISONE 50 MG/1
TABLET ORAL
Qty: 3 TABLET | Refills: 0 | Status: SHIPPED | OUTPATIENT
Start: 2023-10-25 | End: 2022-11-14

## 2022-11-11 NOTE — PROGRESS NOTES
Neurosurgery Office Note  John Young 71 y o  female MRN: [id number]      Assessment/Plan     Benign meningioma [facility])  Patient presents for annual follow up  · S/p left frontoparietal craniotomy for mass resection on 10/15/2020 with Dr Jose Payne  · Pathology consistent with Grade 1 Meningioma  · Residual RLE weakness and right sided numbness/tingling since surgery  · Imaging reviewed personally and by attending  Final results below discussed with the pt  · MRI brain w wo contrast 11/9/22:  No interval change  Stable postoperative changes status post left frontoparietal vertex meningioma resection  No nodular enhancement to suggest presence of residual or recurrent disease  Plan  · Ongoing use of ankle orthotic for support when ambulating to RLE  · At this time, no further neurosurgical intervention is anticipated  Given the pathology was Grade 1 Meningioma, no adjuvant radiation therapy or chemotherapy was required  Patient will required continue surveillance with serial imaging  · Will continue with ongoing surveillance  Follow up in 1 year with MRI brain w wo contrast with [facility] and Dr Jose Payne or sooner should she develop new or worsening neurological symptoms  · Pt needs pre treatment for contrasted imaging given contrast allergy:orders for Benadryl and Prednisone provided  · Pt made aware to contact nsx with any questions or concerns in the interim  Diagnoses and all orders for this visit:    Benign meningioma ([facility] [address] )  -     MRI brain w wo contrast; Future    Contrast media allergy  -     Discontinue: predniSONE 50 mg tablet; TAKE 1 TAB 13 HOURS, 7HOURS, AND 1HR PRIOR TO CONTRAST STUDY Do not start before October 25, 2023   -     Discontinue: diphenhydrAMINE (BENADRYL) 50 MG tablet; Take 1 tab 1 hour prior to MRI with contrast Do not start before October 25, 2023            I spent 45 minutes with the patient today in which >50% of the time was spent counseling/coordination of care regarding diagnosis, imaging review, symptoms and treatment plan  CHIEF COMPLAINT    Annual follow up for meningioma s/p resection      HISTORY    History of Present Illness     71y o  year old female     With PMHx significant for hypercholesterolemia, and meningioma status post resection of left frontoparietal Grade 1 Meningioma resection on 10/15/2020 by Dr Loly Ochoa  Given the staging as Grade 1 meningioma pt did not require adjuvant radiation therapy and continues to be monitored with serial imaging  Pt presents for annual follow up  Post operatively pt had RLE > RUE weakness which improved  Pt reports chronic gait instability and reports she uses a cane for mobility  Pt also had right sided numbness/tingling  Today pt reports she continues to have numbness/tingling on the right side  She reports been getting a few leg cramps on the right  Today patient denies any headache, dizziness, lightheadedness or visual disturbance   Pt denies nausea or vomiting  Pt has custom right AFO brace due to right foot drop which she continues to wear  Pt reports that the right AFO brace is becoming uncomfortable  She reports that she will follow up with a place that she was told has similar braces  She reports she also has the contact for PT where she got her initial custom AFO brace  Today pt denies    Pt was accompanied by her  to this visit  REVIEW OF SYSTEMS    Review of Systems   Constitutional: Negative  HENT: Positive for nosebleeds (x days ago-back in april patient had a few ) and tinnitus (not often )  Eyes: Negative  Respiratory: Negative  Cardiovascular: Negative  Gastrointestinal: Negative  Endocrine: Negative  Genitourinary: Negative      Musculoskeletal: Positive for back pain (lower back a little bit), gait problem (uses a cane-right foot turns in-pt fell @ PT x 1 day ago (Right leg felt heavier than usual)-right foot brace is bothersome ), myalgias (right shoulder pain for the ;ast 6 weeks) and neck pain (a little bit of neck pain from shoulder to neck into arm )  Skin: Negative  Allergic/Immunologic: Negative  Neurological: Positive for weakness and numbness (tinglingling and heaviness whole right side of body)  Negative for dizziness, tremors, seizures, speech difficulty and headaches  Hematological: Negative  Neg AC   Psychiatric/Behavioral: Negative  All other systems reviewed and are negative  Meds/Allergies     Current Outpatient Medications   Medication Sig Dispense Refill   • levETIRAcetam (KEPPRA) 500 mg tablet TAKE 1 TABLET BY MOUTH EVERY 12 HOURS 180 tablet 2   • diphenhydrAMINE (BENADRYL) 50 mg capsule TAKE 1 TAB 1 HOUR PRIOR TO MRI WITH CONTRAST DO NOT START BEFORE 2023  1 capsule 0   • predniSONE 50 mg tablet TAKE 1 TAB 13 HOURS, 7HOURS, AND 1HR PRIOR TO CONTRAST STUDY 3 tablet 0     No current facility-administered medications for this visit  Allergies   Allergen Reactions   • Gadolinium Derivatives Seizures   • Trilipix [Choline Fenofibrate] Other (See Comments)     Pain Started in the upper/mid back and radiated into the front (chest area)  Pt unaware of this response and is unsure         PAST HISTORY    Past Medical History:   Diagnosis Date   • Arthritis     L shoulder   • Brain compression ([address] ) 2020   • Hypercholesteremia    • Pneumonia        Past Surgical History:   Procedure Laterality Date   •  SECTION     • CA EXCIS SUPRATENT MENINGIOMA Left 10/15/2020    Procedure: Image guided left frontoparietal craniotomy for tumor;  Surgeon: Emmie Kelley MD;  Location: [facility];  Service: Neurosurgery   • TUBAL LIGATION         Social History     Tobacco Use   • Smoking status: Never Smoker   • Smokeless tobacco: Never Used   Vaping Use   • Vaping Use: Never used   Substance Use Topics   • Alcohol use: Yes     Comment: special occasions   • Drug use: Never       Family History   Problem Relation Age of Onset   • No Known Problems Mother    • No Known Problems Sister          Above history personally reviewed  EXAM    Vitals:Blood pressure 122/78, pulse 64, temperature 98 °F (36 7 °C), temperature source Temporal, resp  rate 14, height 5' 5" (1 651 m), weight 76 2 kg (168 lb), not currently breastfeeding  ,Body mass index is 27 96 kg/m²  Physical Exam  Constitutional:       General: She is not in acute distress  Appearance: She is well-developed  HENT:      Head: Normocephalic and atraumatic  Eyes:      Pupils: Pupils are equal, round, and reactive to light  Neck:      Trachea: No tracheal deviation  Cardiovascular:      Rate and Rhythm: Normal rate  Pulmonary:      Effort: Pulmonary effort is normal    Abdominal:      Palpations: Abdomen is soft  Tenderness: There is no abdominal tenderness  There is no guarding  Musculoskeletal:      Cervical back: Neck supple  Skin:     General: Skin is warm and dry  Coloration: Skin is not pale  Findings: No rash  Neurological:      Mental Status: She is alert and oriented to person, place, and time  Comments: GCS 15, Awake, Alert, Oriented x 3    Motor: MART, strength 5/5 BUE, RLE HF/KF 4+/5, DF 1-2/5, PF 4/5, LLE: 4+-5/5  AFO brace in place  Sensation:  intact to LT/PP X 4     Reflexes: 2+ and symmetric BUE and LLE, 3+ right patellar, no calderón's     Coordination: no drift bilateral upper extremities, finger to nose normal bilaterally  Psychiatric:         Behavior: Behavior normal          Neurologic Exam     Mental Status   Oriented to person, place, and time  Cranial Nerves     CN III, IV, VI   Pupils are equal, round, and reactive to light  MEDICAL DECISION MAKING    Imaging Studies:     MRI brain w wo contrast    Result Date: 11/11/2022  Narrative: MRI BRAIN WITH AND WITHOUT CONTRAST INDICATION: D32 9: Benign neoplasm of meninges, unspecified  COMPARISON:  None   TECHNIQUE: Multiplanar, multisequence imaging of the brain was performed before and after gadolinium administration  IV Contrast:  14 mL of Gadoterate Meglumine SOLN  IMAGE QUALITY:   Diagnostic  FINDINGS: BRAIN PARENCHYMA:  The patient is status post resection of a previously seen left frontoparietal vertex meningioma  There is stable gliosis and hemosiderin deposition within the operative bed of the left frontoparietal lobe  There is no underlying nodular enhancement to suggest presence of residual or recurrent disease  There is mild chronic microvascular ischemic change  Postcontrast imaging of the brain demonstrates no abnormal enhancement  VENTRICLES:  Normal for the patient's age  SELLA AND PITUITARY GLAND:  Normal  ORBITS:  Normal  PARANASAL SINUSES:  Normal  VASCULATURE:  Evaluation of the major intracranial vasculature demonstrates appropriate flow voids  CALVARIUM AND SKULL BASE:  Normal  EXTRACRANIAL SOFT TISSUES:  Normal      Impression: No interval change  Stable postoperative changes status post left frontoparietal vertex meningioma resection  No nodular enhancement to suggest presence of residual or recurrent disease  Workstation performed: [id number]       I have personally reviewed pertinent reports     and I have personally reviewed pertinent films in PACS

## 2022-11-11 NOTE — PATIENT INSTRUCTIONS
Follow up in 1 year with MRI brain w/wo  Prednisone and Benadryl ordered for you to prep for the imaging

## 2022-11-14 ENCOUNTER — APPOINTMENT (OUTPATIENT)
Dept: PHYSICAL THERAPY | Facility: CLINIC | Age: 72
End: 2022-11-14

## 2022-11-14 RX ORDER — PREDNISONE 50 MG/1
TABLET ORAL
Qty: 3 TABLET | Refills: 0 | Status: SHIPPED | OUTPATIENT
Start: 2022-11-14

## 2022-11-14 RX ORDER — DIPHENHYDRAMINE HCL 50 MG
CAPSULE ORAL
Qty: 1 CAPSULE | Refills: 0 | Status: SHIPPED | OUTPATIENT
Start: 2022-11-14 | End: 2022-11-14

## 2022-11-14 NOTE — ASSESSMENT & PLAN NOTE
Patient presents for annual follow up  · S/p left frontoparietal craniotomy for mass resection on 10/15/2020 with Dr Merline Severance  · Pathology consistent with Grade 1 Meningioma  · Residual RLE weakness and right sided numbness/tingling since surgery  · Imaging reviewed personally and by attending  Final results below discussed with the pt  · MRI brain w wo contrast 11/9/22:  No interval change  Stable postoperative changes status post left frontoparietal vertex meningioma resection  No nodular enhancement to suggest presence of residual or recurrent disease  Plan  · Ongoing use of ankle orthotic for support when ambulating to RLE  · At this time, no further neurosurgical intervention is anticipated  Given the pathology was Grade 1 Meningioma, no adjuvant radiation therapy or chemotherapy was required  Patient will required continue surveillance with serial imaging  · Will continue with ongoing surveillance  Follow up in 1 year with MRI brain w wo contrast with [facility] and Dr Merline Severance or sooner should she develop new or worsening neurological symptoms  · Pt needs pre treatment for contrasted imaging given contrast allergy:orders for Benadryl and Prednisone provided  · Pt made aware to contact nsx with any questions or concerns in the interim

## 2022-11-17 ENCOUNTER — OFFICE VISIT (OUTPATIENT)
Dept: PHYSICAL THERAPY | Facility: CLINIC | Age: 72
End: 2022-11-17

## 2022-11-17 DIAGNOSIS — Z74.09 IMPAIRED MOBILITY AND ENDURANCE: Primary | ICD-10-CM

## 2022-11-17 DIAGNOSIS — Z87.898 HISTORY OF BRAIN TUMOR: ICD-10-CM

## 2022-11-17 DIAGNOSIS — G81.91 RIGHT HEMIPARESIS (HCC): ICD-10-CM

## 2022-11-17 NOTE — PROGRESS NOTES
PT Re-Evaluation     Today's date: 2022  Patient name: Sylvia Arellano  : 1950  MRN: [id number]  Referring provider: Luis Rogers MD  Dx:   Encounter Diagnosis     ICD-10-CM    1  Impaired mobility and endurance  Z74 09       2  Right hemiparesis ([facility] [address] )  G81 91       3  History of brain tumor  Z87 898         Start Time: 4794  Stop Time: 1800  Total time in clinic (min): 30 minutes    Assessment  Assessment details: Pennie Lazo is a 70 y/o F presenting with impaired mobility with RLE hemiparesis 2/2 brain tumor removal approximately 2 years ago  Upon re-evaluation, pt presents with  including RLE synergy patterning, reduced gait speed compared to age-related norms, reduced LE strength/power, impaired functional mobility, and a high risk for falls  She is making progress towards goals and has shown improvement in all re-assessments, most noted with TUG, and FGA  Pt will benefit from skilled PT interventions for gait training, balance training, neuromuscular reeducation, strengthening, endurance training, and functional task practice to assist pt in improving current level of mobility and increasing QOL  Impairments: abnormal coordination, abnormal gait, abnormal muscle firing, abnormal or restricted ROM, abnormal movement, activity intolerance, impaired balance, impaired physical strength, lacks appropriate home exercise program, pain with function and poor posture   Understanding of Dx/Px/POC: good   Prognosis: good    Goals  Short Term Goals: In 6 weeks, the patient will:  1  Improve self-selected gait speed by 0 1 m/s to indicate improving gait and decreased risk of falls- MET  2  Decrease time for 5xSTS to <15s to indicate improved LE strength/power and improving functional mobility capacity - ONGOING  3  Perform home exercise program with min(A) or supervision -ONGOING  4  Additional goals added after further assessment next visit     Long Term Goals: In 12 weeks, the patient will:  1  Improve fast gait speed by 0 2 m/s to indicate decreased risk of falls and improved functional mobility - ONGOING  2  Increase FOTO to greater than predicted value- ONGOING  3  Demonstrate ability to perform home exercise program independently to maintain/continue progress towards goals  -ONGOING    Plan  Planned therapy interventions: home exercise program, therapeutic exercise, therapeutic training, therapeutic activities, strengthening, stretching, patient education, neuromuscular re-education, motor coordination training, orthotic fitting/training, orthotic management and training, balance, balance/weight bearing training, coordination, fine motor coordination training, flexibility, functional ROM exercises and gait training  Frequency: 2x week  Duration in visits: 10  Duration in weeks: 10  Plan of Care beginning date: 11/17/2022  Plan of Care expiration date: 1/12/2023  Treatment plan discussed with: patient        Subjective Pt reports continued feeling of R sided heaviness since last session  Agreeable to re-evaluation today       Objective          11/10 11/17   FGA 14/30      6 Minute Walk Test (ft):  175 ft (SPO2 98%, HR 97 bpm)  329, LOB at 4 min SPC    Gait Speed (m/s): Self selected: 6m/12 9s = 0 47 m/s  Fast: 6m/11 5s = 0 52 m/s    Self selected: 0 57m/sec  Fast: 0 63m/sec   5x Sit To Stand (s): 17 05s    16 16s   TUG: With SPC: 19 9   Without SPC: 29s    With SPC: 15 91s           Precautions: Fall risk    9/29 10/5 10/17 10/20 10/27 11/3 11/7 11/10  11/17    Manuals                                                                                                                  Neuro Re-Ed                      Pt education on POC      EB     EB, orthotic, footwear EB EB, orthotic  EB orthotic    Step ups        6" 2x10 R LE up L LE down, fwd and lat              Step-downs              NV        STS                       Weighted step tap             NV        AAROM RLE eversion/DF with NMES                      Standing hamstring curls  2x10 B       NV 2x10 B/L 2x10 B/L seated with YTB 2x10 B/L       Biodex Maze w/o AFO   x1 1UE x2 0UE x3 1UE x3 1UE       x3 1UE      Biodex LOS w/o AFO   x1 1UE x2 0UE x3 1UE x3 1UE       x3 1UE      Biodex WS   Weight-bearing x2min 0UE X3 2 min 0UE X3 2 min 0UE       X3 2 min 1UE      Biodex Postural Stability    2 min lvl 12  biodex 2 min lvl 10  0UE x3 Lvl 11 1UE x2 min Lvl 11 0UE       x2 min Lvl 11 1UE      Standing on foam w/o brace                      HKM         x5 laps on line w/ cane x5 laps 20' w/ cane and visual cueing to prevent IR of RLE 5 laps        Resisted ambulation @ [place]           10# fwd & back x5 laps ea          PNF            3x10 D2  3x10 D1 3x10 D2  3x10 D1        Balance assessments         EB    Ther Ex                      Leg press  # 3x10 ea  SL   85#  3x10 # 3x10 ea  SL   85#  3x10 #  3x10 ea  SL 85# 3x10 #  3x10 ea  SL  85# 3x10 #  3x10 ea  SL  85# 3x10 #  3x10 ea  SL  3x10 # 3x10, # 3x10        Standing hip ext  x30 B       HEP            Standing hip abd x30 B       HEP            TKE Black TB x30       Black TB x30            Passive stretch for R toes                      R calf stretch w/ towel in long sit                      Standing calf stretch (runner's stretch"   2x30" B w/o brace 4x 20" w/o brace 4x 20" w/o brace     4x30"                               Ther Activity                      Mobility assessments                       Hurdles (fwd/lat)                      Side stepping         YTB x3 laps on line            Resisted ambulation @ [place]         10# fwd & back x5 laps ea            Floor transfer               NV  NV    Vitals               EB      Gait Training                      Emphasis on R knee drive  [id number] over ground after TM                     TM  TM x10min 0 7mph 0% include   x5min0  7mph 4% incline  occasional manual facilitation of R knee flexion TM x10min 1 0mph 0% include   x5min1  0mph 4% incline  occasional manual facilitation of R knee flexion TM x10 1 0 mph including 2% incline VC for R knee flexion TM x10 1 0 mph including 2% incline VC for R knee flexion TM x10 1 0 mph including 2% incline VC for R knee flexion TM x15' 1 0mph including 2% incline verbal and tactilecues for knee flexion TM x10' 1 0mph including 2% incline verbal and tactilecues for knee flexion    M x10min 1 0mph 0% include   [id number]  0mph 4% incline  occasional manual facilitation of R knee flexion    Stairs          x2 flights R HR ascending x3 flights R HR ascending x4 flights R HR ascending                                 Modalities

## 2022-11-21 ENCOUNTER — OFFICE VISIT (OUTPATIENT)
Dept: PHYSICAL THERAPY | Facility: CLINIC | Age: 72
End: 2022-11-21

## 2022-11-21 DIAGNOSIS — G81.91 RIGHT HEMIPARESIS (HCC): ICD-10-CM

## 2022-11-21 DIAGNOSIS — Z74.09 IMPAIRED MOBILITY AND ENDURANCE: Primary | ICD-10-CM

## 2022-11-21 DIAGNOSIS — Z87.898 HISTORY OF BRAIN TUMOR: ICD-10-CM

## 2022-11-21 NOTE — PROGRESS NOTES
Daily Note     Today's date: 2022  Patient name: Clair Odell  : 1950  MRN: [id number]  Referring provider: Brittany Marie MD  Dx:   Encounter Diagnosis     ICD-10-CM    1  Impaired mobility and endurance  Z74 09       2  Right hemiparesis ([facility] [address] )  G81 91       3  History of brain tumor  Z87 [id number]         Start Time: 1630  Stop Time: 1715  Total time in clinic (min): 45 minutes    Subjective: Pt reports she did a lot of cleaning this weekend and is feeling some pain in her wrists but tolerable  Objective: See treatment diary below    Assessment: Pt tolerated treatment well  Able to continue TM training  Noted inc RLE foot inversion with onset of fatigue throughout the session requiring intermittent therapeutic rest breaks  VC for R knee drive to inc floor clearance  Patient would benefit from continued PT to improve functional mobility independence and decrease risk for falls  Plan: Continue per plan of care        Precautions: Fall risk     10/5 10/17 10/20 10/27 11/3 11/7 11/10  11/17 11/21   Manuals                                                                                                             Neuro Re-Ed                     Pt education on POC     EB     EB, orthotic, footwear EB EB, orthotic  EB orthotic EB   Step ups       6" 2x10 R LE up L LE down, fwd and lat              Step-downs             NV        STS                      Weighted step tap            NV        AAROM RLE eversion/DF with NMES                     Standing hamstring curls         NV 2x10 B/L 2x10 B/L seated with YTB 2x10 B/L       Biodex Maze w/o AFO  x1 1UE x2 0UE x3 1UE x3 1UE       x3 1UE   x3 1UE   Biodex LOS w/o AFO  x1 1UE x2 0UE x3 1UE x3 1UE       x3 1UE   x3 1UE   Biodex WS  Weight-bearing x2min 0UE X3 2 min 0UE X3 2 min 0UE       X3 2 min 1UE   X3 2 min 1UE   Biodex random          1 min x2   Biodex Postural Stability   2 min lvl 12  biodex 2 min lvl 10  0UE x3 Lvl 11 1UE x2 min Lvl 11 0UE       x2 min Lvl 11 1UE   x3 1UE   Standing on foam w/o brace                     HKM        x5 laps on line w/ cane x5 laps 20' w/ cane and visual cueing to prevent IR of RLE 5 laps        Resisted ambulation @ [place]          10# fwd & back x5 laps ea          PNF           3x10 D2  3x10 D1 3x10 D2  3x10 D1        Balance assessments         EB    Ther Ex                     Leg press   # 3x10 ea  SL   85#  3x10 #  3x10 ea  SL 85# 3x10 #  3x10 ea  SL  85# 3x10 #  3x10 ea  SL  85# 3x10 #  3x10 ea  SL  3x10 # 3x10, # 3x10     # 3x10, # 3x10, VC snap R knee into hyperextension   Standing hip ext         HEP            Standing hip abd        HEP            TKE        Black TB x30            Passive stretch for R toes                     R calf stretch w/ towel in long sit                     Standing calf stretch (runner's stretch"  2x30" B w/o brace 4x 20" w/o brace 4x 20" w/o brace     4x30"                               Ther Activity                      Mobility assessments                       Hurdles (fwd/lat)                      Side stepping         YTB x3 laps on line            Resisted ambulation @ [place]         10# fwd & back x5 laps ea            Floor transfer               NV  NV EB   Vitals               EB      Gait Training                      Emphasis on R knee drive                      TM   TM x10min 1 0mph 0% include   x5min1  0mph 4% incline  occasional manual facilitation of R knee flexion TM x10 1 0 mph including 2% incline VC for R knee flexion TM x10 1 0 mph including 2% incline VC for R knee flexion TM x10 1 0 mph including 2% incline VC for R knee flexion TM x15' 1 0mph including 2% incline verbal and tactilecues for knee flexion TM x10' 1 0mph including 2% incline verbal and tactilecues for knee flexion    TM x10min 1 0mph 0% include   [id number]  0mph 4% incline  occasional manual facilitation of R knee flexion TM x10min 1 0mph 4% incline VC for R knee flexion     Stairs          x2 flights R HR ascending x3 flights R HR ascending x4 flights R HR ascending      x4 flights R HR ascending                           Modalities

## 2022-11-28 ENCOUNTER — APPOINTMENT (OUTPATIENT)
Dept: PHYSICAL THERAPY | Facility: CLINIC | Age: 72
End: 2022-11-28

## 2022-12-01 ENCOUNTER — OFFICE VISIT (OUTPATIENT)
Dept: PHYSICAL THERAPY | Facility: CLINIC | Age: 72
End: 2022-12-01

## 2022-12-01 DIAGNOSIS — Z87.898 HISTORY OF BRAIN TUMOR: ICD-10-CM

## 2022-12-01 DIAGNOSIS — Z74.09 IMPAIRED MOBILITY AND ENDURANCE: Primary | ICD-10-CM

## 2022-12-01 DIAGNOSIS — G81.91 RIGHT HEMIPARESIS (HCC): ICD-10-CM

## 2022-12-01 NOTE — PROGRESS NOTES
Daily Note     Today's date: 2022  Patient name: Joan Delcid  : 1950  MRN: [id number]  Referring provider: Jarad Eddy MD  Dx:   Encounter Diagnosis     ICD-10-CM    1  Impaired mobility and endurance  Z74 09       2  Right hemiparesis ([address] )  G81 91       3  History of brain tumor  Z87 [id number]         Start Time: 1630  Stop Time: 1715  Total time in clinic (min): 45 minutes    Subjective: Pt reports she has been busy cleaning and decorating  Reports she fell after getting knocked over by her dogs on gi and had B/L swelling and bruised knees  Objective: See treatment diary below    Assessment: Pt tolerated treatment well  Able to continue gait training on TM, pt demonstrated improved R knee flexion with gait requiring minimal verbal/tactile cueing  Noted inc R knee buckle with exercises, requiring tactile activation of quads throughout  Able to complete LE strengthening exercises to improve R knee stability  Pt reported no inc pain at end of session  She would benefit from continued PT to maximize functional mobility independence and dec risk for falls  Plan: Continue per plan of care        Precautions: Fall risk    12/1  10/17 10/20 10/27 11/3 11/7 11/10  11/17 11/21   Manuals                                                                                                        Neuro Re-Ed                    Pt education on POC    EB     EB, orthotic, footwear EB EB, orthotic  EB orthotic EB   Step ups      6" 2x10 R LE up L LE down, fwd and lat              Step-downs            NV        STS                     Weighted step tap           NV        AAROM RLE eversion/DF with NMES                    Standing hamstring curls        NV 2x10 B/L 2x10 B/L seated with YTB 2x10 B/L       Biodex Maze w/o AFO   x3 1UE x3 1UE       x3 1UE   x3 1UE   Biodex LOS w/o AFO   x3 1UE x3 1UE       x3 1UE   x3 1UE   Biodex WS   X3 2 min 0UE X3 2 min 0UE       X3 2 min 1UE   X3 2 min 1UE Biodex random          1 min x2   Biodex Postural Stability   x3 Lvl 11 1UE x2 min Lvl 11 0UE       x2 min Lvl 11 1UE   x3 1UE   Standing on foam w/o brace                    HKM x5 laps 2SH  1UE support, VC R TKE      x5 laps on line w/ cane x5 laps 20' w/ cane and visual cueing to prevent IR of RLE 5 laps        Resisted ambulation @ [place]         10# fwd & back x5 laps ea          PNF          3x10 D2  3x10 D1 3x10 D2  3x10 D1        Balance assessments         EB    Ther Ex                    Leg press    #  3x10 ea  SL 85# 3x10 #  3x10 ea  SL  85# 3x10 #  3x10 ea  SL  85# 3x10 #  3x10 ea  SL  3x10 # 3x10, # 3x10     # 3x10, # 3x10, VC snap R knee into hyperextension   Standing hip ext        HEP            Standing hip abd       HEP            TKE Black TB x30      Black TB x30            Passive stretch for R toes                    R calf stretch w/ towel in long sit                    Standing calf stretch (runner's stretch"   4x 20" w/o brace 4x 20" w/o brace     4x30"                              Ther Activity                     Mobility assessments                      Hurdles (fwd/lat)                     Side stepping        YTB x3 laps on line            Resisted ambulation @ [place]        10# fwd & back x5 laps ea            Floor transfer              NV  NV EB   Vitals              EB      LAQ 2# AW 5SH 3x10            Gait Training                     Emphasis on R knee drive                     TM  TM x10min 1 0mph 4% incline VC for R knee flexion  TM x10 1 0 mph including 2% incline VC for R knee flexion TM x10 1 0 mph including 2% incline VC for R knee flexion TM x10 1 0 mph including 2% incline VC for R knee flexion TM x15' 1 0mph including 2% incline verbal and tactilecues for knee flexion TM x10' 1 0mph including 2% incline verbal and tactilecues for knee flexion    TM x10min 1 0mph 0% include   [id number]  0mph 4% incline  occasional manual facilitation of R knee flexion TM x10min 1 0mph 4% incline VC for R knee flexion     Stairs          x2 flights R HR ascending x3 flights R HR ascending x4 flights R HR ascending      x4 flights R HR ascending                           Modalities

## 2022-12-08 ENCOUNTER — OFFICE VISIT (OUTPATIENT)
Dept: PHYSICAL THERAPY | Facility: CLINIC | Age: 72
End: 2022-12-08

## 2022-12-08 DIAGNOSIS — Z87.898 HISTORY OF BRAIN TUMOR: ICD-10-CM

## 2022-12-08 DIAGNOSIS — Z74.09 IMPAIRED MOBILITY AND ENDURANCE: Primary | ICD-10-CM

## 2022-12-08 DIAGNOSIS — G81.91 RIGHT HEMIPARESIS (HCC): ICD-10-CM

## 2022-12-08 NOTE — PROGRESS NOTES
Daily Note     Today's date: 2022  Patient name: Kin Dakins  : 1950  MRN: [id number]  Referring provider: Rosi Hawthorne MD  Dx:   Encounter Diagnosis     ICD-10-CM    1  Impaired mobility and endurance  Z74 09       2  Right hemiparesis ([facility] [address] )  G81 91       3  History of brain tumor  Z87 898           Start Time: 1615  Stop Time: 1700  Total time in clinic (min): 45 minutes    Subjective: Pt reports she has been doing the stairs multiple times a day  Objective: See treatment diary below    Assessment: Tolerated treatment well  She continues to require verbal and tactile cueing for TKE on RLE  Utilized visual cones to prevent circumduction with step ups with RLE  Pt demonstrated good technique throughout  Patient would benefit from continued PT  To improve functional mobility independence and dec risk for falls  Plan: Continue per plan of care        Precautions: Fall risk    12/1 12/8  10/20 10/27 11/3 11/7 11/10  11/17 11/21   Manuals                                                                                                   Neuro Re-Ed                   Pt education on POC         EB, orthotic, footwear EB EB, orthotic  EB orthotic EB   Step ups   6" " 30x with RLE and tactile cueing with cones  6" 2x10 R LE up L LE down, fwd and lat              Step-downs           NV        STS                    Weighted step tap          NV        AAROM RLE eversion/DF with NMES                   Standing hamstring curls   2x10 B/L    NV 2x10 B/L 2x10 B/L seated with YTB 2x10 B/L       Biodex Maze w/o AFO    x3 1UE       x3 1UE   x3 1UE   Biodex LOS w/o AFO    x3 1UE       x3 1UE   x3 1UE   Biodex WS    X3 2 min 0UE       X3 2 min 1UE   X3 2 min 1UE   Biodex random          1 min x2   Biodex Postural Stability    x2 min Lvl 11 0UE       x2 min Lvl 11 1UE   x3 1UE   Standing on foam w/o brace                   HKM x5 laps 2SH  1UE support, VC R TKE x5 laps 2SH  1UE support, VC R TKE    x5 laps on line w/ cane x5 laps 20' w/ cane and visual cueing to prevent IR of RLE 5 laps        Resisted ambulation @ [place]        10# fwd & back x5 laps ea          PNF         3x10 D2  3x10 D1 3x10 D2  3x10 D1        Balance assessments         EB    Foam step ups  10x3 ea 1UE           Ther Ex                   Leg press     #  3x10 ea  SL  85# 3x10 #  3x10 ea  SL  85# 3x10 #  3x10 ea  SL  3x10 # 3x10, # 3x10     # 3x10, # 3x10, VC snap R knee into hyperextension   Standing hip ext       HEP            Standing hip abd      HEP            TKE Black TB x30 Black TB x30    Black TB x30            Passive stretch for R toes                   R calf stretch w/ towel in long sit                   Standing calf stretch (runner's stretch"    4x 20" w/o brace     4x30"                             Ther Activity                    Mobility assessments                     Hurdles (fwd/lat)                    Side stepping       YTB x3 laps on line            Resisted ambulation @ [place]       10# fwd & back x5 laps ea            Floor transfer             NV  NV EB   Vitals             EB      LAQ 2# AW 5SH 3x10 3#  AW 5SH 3x10           Gait Training                    Emphasis on R knee drive                    TM  TM x10min 1 0mph 4% incline VC for R knee flexion TM x10min 1 0mph 4% incline VC for R knee flexion  TM x10 1 0 mph including 2% incline VC for R knee flexion TM x10 1 0 mph including 2% incline VC for R knee flexion TM x15' 1 0mph including 2% incline verbal and tactilecues for knee flexion TM x10' 1 0mph including 2% incline verbal and tactilecues for knee flexion    TM x10min 1 0mph 0% include   [id number]  0mph 4% incline  occasional manual facilitation of R knee flexion TM x10min 1 0mph 4% incline VC for R knee flexion     Stairs          x2 flights R HR ascending x3 flights R HR ascending x4 flights R HR ascending      x4 flights R HR ascending                         Modalities

## 2022-12-13 ENCOUNTER — APPOINTMENT (OUTPATIENT)
Dept: PHYSICAL THERAPY | Facility: CLINIC | Age: 72
End: 2022-12-13

## 2022-12-22 ENCOUNTER — APPOINTMENT (OUTPATIENT)
Dept: PHYSICAL THERAPY | Facility: CLINIC | Age: 72
End: 2022-12-22

## 2022-12-23 ENCOUNTER — TELEPHONE (OUTPATIENT)
Dept: NEUROLOGY | Facility: CLINIC | Age: 72
End: 2022-12-23

## 2022-12-23 NOTE — TELEPHONE ENCOUNTER
Yury Brownlee has called back to re-schedule an appt she has scheduled for 01/13/2023 at the [place] office with [facility] and was left a vm to call us back and re-schedule       I have re-scheduled to the next available and placed on the waiting list     New Appt  04/19/2021  10:20 am, [facility], [place]      Thank you,     Priyanka Branch

## 2022-12-29 ENCOUNTER — OFFICE VISIT (OUTPATIENT)
Dept: PHYSICAL THERAPY | Facility: CLINIC | Age: 72
End: 2022-12-29

## 2022-12-29 DIAGNOSIS — Z74.09 IMPAIRED MOBILITY AND ENDURANCE: Primary | ICD-10-CM

## 2022-12-29 DIAGNOSIS — G81.91 RIGHT HEMIPARESIS (HCC): ICD-10-CM

## 2022-12-29 NOTE — PROGRESS NOTES
Progress Note    Today's date: 2022  Patient name: Riley Johnson  : 1950  MRN: [id number]  Referring provider: Storm Monk MD  Dx:   Encounter Diagnosis     ICD-10-CM    1  Impaired mobility and endurance  Z74 09       2  Right hemiparesis ([facility] [address] )  G81 91         Start Time: 1630  Stop Time: 1720  Total time in clinic (min): 50 minutes    Assessment/Plan   Assessment: Shelbi Bonds is a 70 yo F who presents to PT after s/p removal of brain tumor and has residual LE weakness and dec balance  Since last evaluation, pt has improved in gait speed, and LE power  She currently ambulates with SPC and R AFO  Her primary complaint is dec balance, her R LE inverting with ambulation, and dec comfort of AFO  She continutes to have gait dysfunction, dec RLE strength, and dec balance compared to age related norms  Patient eductaion included prognosis, orthotic options, and POC  Will taper frequency down to 1x/week  Pt agreeable  She will benefit from continued skilled PT to improve muscular endurance, dec risk for falls, improve gait dysfunction, and maximize functional mobility independence  Short Term Goals: In 6 weeks, the patient will:  1  Improve self-selected gait speed by 0 1 m/s to indicate improving gait and decreased risk of falls- MET  2  Decrease time for 5xSTS to <15s to indicate improved LE strength/power and improving functional mobility capacity - MET  3  Perform home exercise program with min(A) or supervision -MET    Long Term Goals: In 12 weeks, the patient will:  1  Improve fast gait speed by 0 2 m/s to indicate decreased risk of falls and improved functional mobility - MET  2  Increase FOTO to greater than predicted value- MET  3  Demonstrate ability to perform home exercise program independently to maintain/continue progress towards goals  -ONGOING  4  Complete 2mWT demonstrating improved cardiovascular endurance   -PARTIALLY    Subjective Pt reports she has had difficulty getting here due to lack of transportation       Objective See treatment diary below          11/10 11/17 12/29   FGA 14/30 14/30   6 Minute Walk Test (ft):  042 ft (SPO2 98%, HR 97 bpm)  329, LOB at 4 min SPC   NV   Gait Speed (m/s): Self selected: 6m/12 9s = 0 47 m/s  Fast: 6m/11 5s = 0 52 m/s    Self selected: 0 57m/sec  Fast: 0 63m/sec Self selected:  0 51m/s  Fast: 0 713m/s   5x Sit To Stand (s): 17 05s    16 16s 13 96s   TUG: With SPC: 19 9   Without SPC: 29s    With SPC: 15 91s  With SPC: 19 61s         Precautions: Fall risk    12/1 12/8  12/29 10/20 10/27 11/3 11/7 11/10  11/17 11/21   Manuals                                                                                                                       Neuro Re-Ed                       Pt education on POC       EB     EB, orthotic, footwear EB EB, orthotic  EB orthotic EB   Step ups    6" " 30x with RLE and tactile cueing with cones   6" 2x10 R LE up L LE down, fwd and lat               Step-downs              NV         STS                        Weighted step tap             NV         AAROM RLE eversion/DF with NMES                       Standing hamstring curls    2x10 B/L     NV 2x10 B/L 2x10 B/L seated with YTB 2x10 B/L        Biodex Maze w/o AFO       x3 1UE       x3 1UE   x3 1UE   Biodex LOS w/o AFO       x3 1UE       x3 1UE   x3 1UE   Biodex WS       X3 2 min 0UE       X3 2 min 1UE   X3 2 min 1UE   Biodex random                   1 min x2   Biodex Postural Stability       x2 min Lvl 11 0UE       x2 min Lvl 11 1UE   x3 1UE   Standing on foam w/o brace                       HKM x5 laps 2SH  1UE support, VC R TKE x5 laps 2SH  1UE support, VC R TKE     x5 laps on line w/ cane x5 laps 20' w/ cane and visual cueing to prevent IR of RLE 5 laps         Resisted ambulation @ [place]           10# fwd & back x5 laps ea           PNF            3x10 D2  3x10 D1 3x10 D2  3x10 D1         Balance assessments      EB           EB     Foam step ups   10x3 ea 1UE                   Ther Ex                       Leg press        #  3x10 ea  SL  85# 3x10 #  3x10 ea  SL  85# 3x10 #  3x10 ea  SL  3x10 # 3x10, # 3x10     # 3x10, # 3x10, VC snap R knee into hyperextension   Standing hip ext          HEP             Standing hip abd         HEP             TKE Black TB x30 Black TB x30     Black TB x30             Passive stretch for R toes                       R calf stretch w/ towel in long sit                       Standing calf stretch (runner's stretch"       4x 20" w/o brace     4x30"                                 Ther Activity                       Mobility assessments                        Hurdles (fwd/lat)                       Side stepping         YTB x3 laps on line             Resisted ambulation @ [place]         10# fwd & back x5 laps ea             Floor transfer               NV  NV EB   Vitals               EB       LAQ 2# AW 5SH 3x10 3#  AW 5SH 3x10                   Gait Training                       Emphasis on R knee drive                        TM  TM x10min 1 0mph 4% incline VC for R knee flexion TM x10min 1 0mph 4% incline VC for R knee flexion  TM x10min 1 0mph 5% incline VC for R knee flexion TM x10 1 0 mph including 2% incline VC for R knee flexion TM x10 1 0 mph including 2% incline VC for R knee flexion TM x15' 1 0mph including 2% incline verbal and tactilecues for knee flexion TM x10' 1 0mph including 2% incline verbal and tactilecues for knee flexion    TM x10min 1 0mph 0% include   [id number]  0mph 4% incline  occasional manual facilitation of R knee flexion TM x10min 1 0mph 4% incline VC for R knee flexion      Stairs          x2 flights R HR ascending x3 flights R HR ascending x4 flights R HR ascending      x4 flights R HR ascending                           Modalities

## 2023-01-03 ENCOUNTER — TELEPHONE (OUTPATIENT)
Dept: NEUROLOGY | Facility: CLINIC | Age: 73
End: 2023-01-03

## 2023-01-12 ENCOUNTER — OFFICE VISIT (OUTPATIENT)
Dept: PHYSICAL THERAPY | Facility: CLINIC | Age: 73
End: 2023-01-12

## 2023-01-12 DIAGNOSIS — Z74.09 IMPAIRED MOBILITY AND ENDURANCE: Primary | ICD-10-CM

## 2023-01-12 DIAGNOSIS — G81.91 RIGHT HEMIPARESIS (HCC): ICD-10-CM

## 2023-01-12 NOTE — PROGRESS NOTES
Daily Note     Today's date: 2023  Patient name: Merline Furnish  : 1950  MRN: [id number]  Referring provider: Ron Mendoza MD  Dx:   Encounter Diagnosis     ICD-10-CM    1  Impaired mobility and endurance  Z74 09       2  Right hemiparesis ([facility] [address] )  G81 91         Start Time: 1733  Stop Time:   Total time in clinic (min): 46 minutes    Subjective: Pt reports new neck pain, thinks she slept wrong  Objective: See treatment diary below    Assessment: Pt tolerated treatment well  Continued TM gait training, VC for R knee flexion for improved R LE clearance  Patient would benefit from continued PT to improve functional mobility independence and dec risk for falls  Plan: Continue per plan of care         Precautions: Fall risk    12/1 12/8  12/29 1/12   11/7 11/10  11/17 11/21   Manuals                                                                                                        Neuro Re-Ed                    Pt education on POC       EB    EB EB, orthotic  EB orthotic EB   Step ups    6" " 30x with RLE and tactile cueing with cones   6" " 30x with RLE and tactile cueing with cones             Step-downs           NV         STS                     Weighted step tap          NV         AAROM RLE eversion/DF with NMES                    Standing hamstring curls    2x10 B/L      2x10 B/L seated with YTB 2x10 B/L        Biodex Maze w/o AFO            x3 1UE   x3 1UE   Biodex LOS w/o AFO            x3 1UE   x3 1UE   Biodex WS            X3 2 min 1UE   X3 2 min 1UE   Biodex random                1 min x2   Biodex Postural Stability            x2 min Lvl 11 1UE   x3 1UE   Standing on foam w/o brace                    HKM x5 laps 2SH  1UE support, VC R TKE x5 laps 2SH  1UE support, VC R TKE      5 laps         Resisted ambulation @ [place]                    PNF          3x10 D2  3x10 D1         Balance assessments      EB        EB     Foam step ups   10x3 ea 1UE                Ther Ex                    Leg press         NV  # 3x10, # 3x10     # 3x10, # 3x10, VC snap R knee into hyperextension   Standing hip ext                     Standing hip abd                    TKE Black TB x30 Black TB x30                Passive stretch for R toes                    R calf stretch w/ towel in long sit                    Standing calf stretch (runner's stretch"          4x30"          patient education        POC, shoe, brace, prognosis, HEP, discharge plan            Ther Exercise                    Mobility assessments                     Hurdles (fwd/lat)                    Side stepping                    Resisted ambulation @ [place]       Fwd/bwd walking ecc/conc 3 x2 laps ea             Floor transfer            NV  NV EB   Vitals       EB     EB       LAQ 2# AW 5SH 3x10 3#  AW 5SH 3x10   3#AW 3SH 3x10             Gait Training                    Emphasis on R knee drive                     TM  TM x10min 1 0mph 4% incline VC for R knee flexion TM x10min 1 0mph 4% incline VC for R knee flexion  TM x10min 1 0mph 5% incline VC for R knee flexion 10' 1  1mph 2 5%incline    TM x10' 1 0mph including 2% incline verbal and tactilecues for knee flexion    TM x10min 1 0mph 0% include   [id number]  0mph 4% incline  occasional manual facilitation of R knee flexion TM x10min 1 0mph 4% incline VC for R knee flexion      Stairs           x4 flights R HR ascending      x4 flights R HR ascending                           Modalities

## 2023-01-19 ENCOUNTER — APPOINTMENT (OUTPATIENT)
Dept: PHYSICAL THERAPY | Facility: CLINIC | Age: 73
End: 2023-01-19

## 2023-01-26 ENCOUNTER — OFFICE VISIT (OUTPATIENT)
Dept: PHYSICAL THERAPY | Facility: CLINIC | Age: 73
End: 2023-01-26

## 2023-01-26 DIAGNOSIS — Z74.09 IMPAIRED MOBILITY AND ENDURANCE: Primary | ICD-10-CM

## 2023-01-26 DIAGNOSIS — G81.91 RIGHT HEMIPARESIS (HCC): ICD-10-CM

## 2023-01-26 NOTE — PROGRESS NOTES
Daily Note     Today's date: 2023  Patient name: Alex Schwartz  : 1950  MRN: [id number]  Referring provider: Heather Singh MD  Dx:   Encounter Diagnosis     ICD-10-CM    1  Impaired mobility and endurance  Z74 09       2  Right hemiparesis ([facility] [address] )  G81 91         Start Time: 1630  Stop Time: 3147  Total time in clinic (min): 23 minutes    Subjective: Pt reports     Objective: See treatment diary below    Assessment: Pt tolerated treatment well  Continued gait training on treadmill  Pt continues to require VC for RLE clearance  Progressed strengthening exercises to simulate getting on a horse, which is patients goal  Patient would benefit from continued PT to dec risk for falls  NV progress note  Plan: Continue per plan of care        Precautions: Fall risk    12/1 12/8  12/29 1/12 1/26  11/7 11/10  11/17 11/21   Manuals                                                                                                        Neuro Re-Ed                    Pt education on POC       EB EB   EB EB, orthotic  EB orthotic EB   Step ups    6" " 30x with RLE and tactile cueing with cones   6" " 30x with RLE and tactile cueing with cones             Step-downs           NV         STS                     Weighted step tap          NV         AAROM RLE eversion/DF with NMES                    Standing hamstring curls    2x10 B/L      2x10 B/L seated with YTB 2x10 B/L        Biodex Maze w/o AFO            x3 1UE   x3 1UE   Biodex LOS w/o AFO            x3 1UE   x3 1UE   Biodex WS            X3 2 min 1UE   X3 2 min 1UE   Biodex random                1 min x2   Biodex Postural Stability            x2 min Lvl 11 1UE   x3 1UE   Standing on foam w/o brace                    HKM x5 laps 2SH  1UE support, VC R TKE x5 laps 2SH  1UE support, VC R TKE      5 laps         Resisted ambulation @ [place]        7# fwd/bwd 4 laps ea    Lat 6# ea 4 laps            PNF          3x10 D2  3x10 D1         Balance assessments      EB  NV      EB     Foam step ups   10x3 ea 1UE                Hip abd/flexion horse     Simulated     20x with 1UE support        Ther Ex                    Leg press          DL 85# 3x10    SL 85# 2x10  # 3x10, # 3x10     # 3x10, # 3x10, VC snap R knee into hyperextension   Standing hip ext                     Standing hip abd                    TKE Black TB x30 Black TB x30                Passive stretch for R toes                    R calf stretch w/ towel in long sit                    Standing calf stretch (runner's stretch"          4x30"          patient education                    Ther Exercise                    Mobility assessments                     Hurdles (fwd/lat)                    Side stepping                    Resisted ambulation @ [place]       Fwd/bwd walking ecc/conc 3 x2 laps ea               Floor transfer            NV  NV EB   Vitals       EB     EB       LAQ 2# AW 5SH 3x10 3#  AW 5SH 3x10   3#AW 3SH 3x10             Gait Training                    Emphasis on R knee drive                     TM  TM x10min 1 0mph 4% incline VC for R knee flexion TM x10min 1 0mph 4% incline VC for R knee flexion  TM x10min 1 0mph 5% incline VC for R knee flexion 10' 1  1mph 2 5%incline  10' 1 0mph 2 5% incline   VC for R knee flexion  TM x10' 1 0mph including 2% incline verbal and tactilecues for knee flexion    TM x10min 1 0mph 0% include   [id number]  0mph 4% incline  occasional manual facilitation of R knee flexion TM x10min 1 0mph 4% incline VC for R knee flexion      Stairs           x4 flights R HR ascending      x4 flights R HR ascending                           Modalities

## 2023-02-02 ENCOUNTER — OFFICE VISIT (OUTPATIENT)
Dept: PHYSICAL THERAPY | Facility: CLINIC | Age: 73
End: 2023-02-02

## 2023-02-02 DIAGNOSIS — Z74.09 IMPAIRED MOBILITY AND ENDURANCE: Primary | ICD-10-CM

## 2023-02-02 DIAGNOSIS — G81.91 RIGHT HEMIPARESIS (HCC): ICD-10-CM

## 2023-02-02 NOTE — PROGRESS NOTES
Progress Note    Today's date: 2023  Patient name: Quirino Ormond  : 1950  MRN: [id number]  Referring provider: Renetta Mares MD  Dx:   Encounter Diagnosis     ICD-10-CM    1  Impaired mobility and endurance  Z74 09       2  Right hemiparesis ([facility] [address] )  G81 91         Start Time: 1630  Stop Time: 1710  Total time in clinic (min): 40 minutes    Assessment/Plan  Assessment: Tanika Hunter is a 72 yo F who presents to PT after s/p removal of brain tumor and has chronic LE weakness and dec balance  Since last progress note, pt has reported inc R foot IR which makes patient FOF  She has dec gait speed, and balance  She continutes to have gait dysfunction, dec RLE strength, and dec balance compared to age related norms  Significant amount of time spent completing patient education including prognosis, orthotic options, POC and HEP  Will continue PT 1x/week for 1 more month  Pt agreeable  She will benefit from continued skilled PT to improve muscular endurance, dec risk for falls, improve gait dysfunction, and maximize functional mobility independence  Short Term Goals: In 6 weeks, the patient will:  1  Improve self-selected gait speed by 0 1 m/s to indicate improving gait and decreased risk of falls- MET  2  Decrease time for 5xSTS to <15s to indicate improved LE strength/power and improving functional mobility capacity - MET  3  Perform home exercise program with min(A) or supervision -MET    Long Term Goals: In 12 weeks, the patient will:  1  Improve fast gait speed by 0 2 m/s to indicate decreased risk of falls and improved functional mobility - MET  2  Increase FOTO to greater than predicted value- MET  3  Demonstrate ability to perform home exercise program independently to maintain/continue progress towards goals  -MET  4  Complete 2mWT demonstrating improved cardiovascular endurance  -PARTIALLY    Subjective Pt reports nothing new since last session      Objective        11/10 11/17 12/29 2/2   FGA 14/30     14/30 15/30   6 Minute Walk Test (ft):  633 ft (SPO2 98%, HR 97 bpm)  329, LOB at 4 min SPC   NV NT   Gait Speed (m/s): Self selected: 6m/12 9s = 0 47 m/s  Fast: 6m/11 5s = 0 52 m/s    Self selected: 0 57m/sec  Fast: 0 63m/sec Self selected:  0 51m/s  Fast: 0 713m/s Self selected: 0 38m/s  Fast: 0 4m/s   5x Sit To Stand (s): 17 05s    16 16s 13 96s 12 68s   ABC     46%   TUG: With SPC: 19 9   Without SPC: 29s    With SPC: 15 91s  With SPC: 19 61s With SPC:  24 55s foot turning in        Precautions:   Past Medical History:   Diagnosis Date   • Arthritis     L shoulder   • [facility] ([facility] ) 9/11/2020   • Hypercholesteremia    • Pneumonia    Precautions: Fall risk                  12/1 12/8 12/29 1/12 1/26 2/2 11/17 11/21   Manuals                                                                                                             Neuro Re-Ed                     Pt education on POC       EB EB        EB orthotic EB   Step ups    6" " 30x with RLE and tactile cueing with cones   6" " 30x with RLE and tactile cueing with cones             Step-downs                      STS                      Weighted step tap                     AAROM RLE eversion/DF with NMES                     Standing hamstring curls    2x10 B/L                 Biodex Maze w/o AFO                 x3 1UE   Biodex LOS w/o AFO                 x3 1UE   Biodex WS                 X3 2 min 1UE   Biodex random                 1 min x2   Biodex Postural Stability                 x3 1UE   Standing on foam w/o brace                     HKM x5 laps 2SH  1UE support, VC R TKE x5 laps 2SH  1UE support, VC R TKE                 Resisted ambulation @ [place]         7# fwd/bwd 4 laps ea     Lat 6# ea 4 laps           PNF                     Balance assessments      EB   NV     EB     Foam step ups   10x3 ea 1UE                 Hip abd/flexion horse         Simulated      20x with 1UE support           Ther Ex                     Leg press           DL 85# 3x10     SL 85# 2x10       # 3x10, # 3x10, VC snap R knee into hyperextension   Standing hip ext                      Standing hip abd                     TKE Black TB x30 Black TB x30                 Passive stretch for R toes                     R calf stretch w/ towel in long sit                     Standing calf stretch (runner's stretch"                      patient education                     Ther Exercise                     Mobility assessments                      Hurdles (fwd/lat)                     Side stepping                     Resisted ambulation @ [place]       Fwd/bwd walking ecc/conc 3 x2 laps ea                Floor transfer                NV EB   Vitals       EB             LAQ 2# AW 5SH 3x10 3#  AW 5SH 3x10   3#AW 3SH 3x10             Gait Training                     Emphasis on R knee drive                      TM  TM x10min 1 0mph 4% incline VC for R knee flexion TM x10min 1 0mph 4% incline VC for R knee flexion  TM x10min 1 0mph 5% incline VC for R knee flexion 10' 1  1mph 2 5%incline  10' 1 0mph 2 5% incline   VC for R knee flexion      TM x10min 1 0mph 0% include   [id number]  0mph 4% incline  occasional manual facilitation of R knee flexion TM x10min 1 0mph 4% incline VC for R knee flexion      Stairs                   x4 flights R HR ascending                           Modalities

## 2023-02-03 ENCOUNTER — TELEPHONE (OUTPATIENT)
Dept: NEUROLOGY | Facility: CLINIC | Age: 73
End: 2023-02-03

## 2023-02-03 NOTE — TELEPHONE ENCOUNTER
Recd vm: Ah yes, I'd like to speak to someone I like to get a new brace for my leg  I guess I understand I need to get a prescription from the doctor in order to get to get another one  I had this brace for like 2 years and they're starting to bother me  If I could get a prescription from the doctor so I could get a new one  call me back at my cell phone number, or also call me back on the house number, which is [phone]  Prefer they call me back on the house number and leave a message  And I'll get back to you as soon as possible  Thank you  Bye   [phone]  brooks Avinatadhusam, 1950      I returned call:    Patient said she would like a shorter leg brace through a new company  She said she uses brace to stabilize her leg as "my leg turns in"  I asked her to reach out to [employer] to determine how order should reach for new brace however she was adamant that Dr Matthew Arenas will know what to order  A brace that goes under her foot and is below the knee currently with velcro; would like a little shorter for comfort  She wasn't sure name of [employer] company either but said we could "look it up" as company is in [address]  Please order if in agreement and able, thank you

## 2023-02-06 DIAGNOSIS — G81.91 RIGHT HEMIPARESIS (HCC): Primary | ICD-10-CM

## 2023-02-07 NOTE — TELEPHONE ENCOUNTER
February 6, 2023  Nivia Hoff MD  to Chucho Ness RN      11:19 AM  Order placed,  prosthetics in [facility]

## 2023-02-07 NOTE — TELEPHONE ENCOUNTER
Called [facility] at [phone] in [address] [place] [address] and retrieved fax number: [phone]  Order printed and faxed successfully  Nothing further at this time

## 2023-02-09 ENCOUNTER — OFFICE VISIT (OUTPATIENT)
Dept: PHYSICAL THERAPY | Facility: CLINIC | Age: 73
End: 2023-02-09

## 2023-02-09 DIAGNOSIS — G81.91 RIGHT HEMIPARESIS (HCC): ICD-10-CM

## 2023-02-09 DIAGNOSIS — Z74.09 IMPAIRED MOBILITY AND ENDURANCE: Primary | ICD-10-CM

## 2023-02-09 NOTE — PROGRESS NOTES
Daily Note     Today's date: 2023  Patient name: Cortney Blackburn  : 1950  MRN: [id number]  Referring provider: Good Odell MD  Dx:   Encounter Diagnosis     ICD-10-CM    1  Impaired mobility and endurance  Z74 09       2  Right hemiparesis ([facility] [address] )  G81 91         Start Time: 1630  Stop Time: 8465  Total time in clinic (min): 45 minutes    Subjective: Pt reports she has an appointment for a new brace  Objective: See treatment diary below    Assessment: Tolerated treatment well  VC for RLE clearance, noted improved RLE control with step downs  Patient would benefit from continued PT to continue to improve RLE functional mobility and dec risk for falls  Plan: Continue per plan of care        Precautions:   Past Medical History:   Diagnosis Date   • Arthritis     L shoulder   • Brain compression ([facility] [address] ) 2020   • Hypercholesteremia    • Pneumonia    Precautions: Fall risk                     Manuals                                                                                                             Neuro Re-Ed                     Pt education on POC       EB EB     EB   EB orthotic EB   Step ups    6" " 30x with RLE and tactile cueing with cones   6" " 30x with RLE and tactile cueing with cones     6" 10x RLE        Step-downs              RLE 10x 4"         STS                      Weighted step tap                     AAROM RLE eversion/DF with NMES                     Standing hamstring curls    2x10 B/L                 Biodex Maze w/o AFO                 x3 1UE   Biodex LOS w/o AFO                 x3 1UE   Biodex WS                 X3 2 min 1UE   Biodex random                 1 min x2   Biodex Postural Stability                 x3 1UE   Standing on foam w/o brace                     HKM x5 laps 2SH  1UE support, VC R TKE x5 laps 2SH  1UE support, VC R TKE         5 laps  2SH  1UE            Resisted ambulation @ [place]         7# fwd/bwd 4 laps ea     Lat 6# ea 4 laps           PNF                     Balance assessments      EB   NV     EB     Foam step ups   10x3 ea 1UE         2x10 RLE         Hip abd/flexion horse         Simulated      20x with 1UE support   Simulated      20x with 1UE support        Ther Ex                     Leg press           DL 85# 3x10     SL 85# 2x10       # 3x10, # 3x10, VC snap R knee into hyperextension   Standing hip ext              RTB 1x15        Standing hip abd             RTB 1x15        TKE Black TB x30 Black TB x30                 Passive stretch for R toes                     R calf stretch w/ towel in long sit                     Standing calf stretch (runner's stretch"                      patient education                     Ther Exercise                     Mobility assessments                      Hurdles (fwd/lat)                     Side stepping             3 laps        Resisted ambulation @ [place]       Fwd/bwd walking ecc/conc 3 x2 laps ea                Floor transfer                NV EB   Vitals       EB             LAQ 2# AW 5SH 3x10 3#  AW 5SH 3x10   3#AW 3SH 3x10             Gait Training                     Emphasis on R knee drive                      TM  TM x10min 1 0mph 4% incline VC for R knee flexion TM x10min 1 0mph 4% incline VC for R knee flexion  TM x10min 1 0mph 5% incline VC for R knee flexion 10' 1  1mph 2 5%incline  10' 1 0mph 2 5% incline   VC for R knee flexion   10' 1 0mph 2% incline VC for R knee flexion, inc R clearance   TM x10min 1 0mph 0% include   [id number]  0mph 4% incline  occasional manual facilitation of R knee flexion TM x10min 1 0mph 4% incline VC for R knee flexion      Stairs                   x4 flights R HR ascending                           Modalities

## 2023-02-16 ENCOUNTER — OFFICE VISIT (OUTPATIENT)
Dept: PHYSICAL THERAPY | Facility: CLINIC | Age: 73
End: 2023-02-16

## 2023-02-16 DIAGNOSIS — Z74.09 IMPAIRED MOBILITY AND ENDURANCE: Primary | ICD-10-CM

## 2023-02-16 DIAGNOSIS — G81.91 RIGHT HEMIPARESIS (HCC): ICD-10-CM

## 2023-02-16 NOTE — PROGRESS NOTES
Daily Note     Today's date: 2023  Patient name: Dariusz Santiago  : 1950  MRN: [id number]  Referring provider: Sarah Faust MD  Dx:   Encounter Diagnosis     ICD-10-CM    1  Impaired mobility and endurance  Z74 09       2  Right hemiparesis ([facility] [address] )  G81 91         Start Time: 1615  Stop Time: 1700  Total time in clinic (min): 45 minutes    Subjective: Pt reports she completed her HEP  Objective: See treatment diary below     Assessment: Pt tolerated treatment well  Progressed LE strengthening and balance as able  Significant amount of time spent completing patient education about brace, HEP and POC  Patient would benefit from continued PT  NV progress note/potential discharge  Plan: Continue per plan of care        Precautions:   Past Medical History:   Diagnosis Date   • Arthritis     L shoulder   • Brain compression ([facility] [address] ) 2020   • Hypercholesteremia    • Pneumonia    Precautions: Fall risk                     Manuals                                                                                                        Neuro Re-Ed                    Pt education on POC       EB EB     EB   EB   Step ups    6" " 30x with RLE and tactile cueing with cones   6" " 30x with RLE and tactile cueing with cones     6" 10x RLE 6" 20x RLE      Step-downs              RLE 10x 4"  RLE 20x 4"      STS                     Weighted step tap                    AAROM RLE eversion/DF with NMES                    Standing hamstring curls    2x10 B/L                Biodex Maze w/o AFO                x3 1UE   Biodex LOS w/o AFO                x3 1UE   Biodex WS                X3 2 min 1UE   Biodex random                1 min x2   Biodex Postural Stability                x3 1UE   Standing on foam w/o brace                    HKM x5 laps 2SH  1UE support, VC R TKE x5 laps 2SH  1UE support, VC R TKE         5 laps  2SH  1UE           Resisted ambulation @ [place]         7# fwd/bwd 4 laps ea     Lat 6# ea 4 laps          PNF                    Balance assessments      EB   NV          Foam step ups   10x3 ea 1UE         2x10 RLE        Hip abd/flexion horse         Simulated      20x with 1UE support   Simulated      20x with 1UE support Simulated      3x10 with 1UE support      Ther Ex                    Leg press           DL 85# 3x10     SL 85# 2x10      # 3x10, # 3x10, VC snap R knee into hyperextension   Standing hip ext              RTB 1x15       Standing hip abd             RTB 1x15       TKE Black TB x30 Black TB x30                Passive stretch for R toes                    R calf stretch w/ towel in long sit              30s x2      Standing calf stretch (runner's stretch"              30s x2       patient education                    Ther Exercise                    Mobility assessments                     Hurdles (fwd/lat)                    Side stepping             3 laps       Resisted ambulation @ [place]       Fwd/bwd walking ecc/conc 3 x2 laps ea               Floor transfer                EB   Vitals       EB            LAQ 2# AW 5SH 3x10 3#  AW 5SH 3x10   3#AW 3SH 3x10            Heel raises        L SL 2x10     Gait Training                    Emphasis on R knee drive                     TM  TM x10min 1 0mph 4% incline VC for R knee flexion TM x10min 1 0mph 4% incline VC for R knee flexion  TM x10min 1 0mph 5% incline VC for R knee flexion 10' 1  1mph 2 5%incline  10' 1 0mph 2 5% incline   VC for R knee flexion   10' 1 0mph 2% incline VC for R knee flexion, inc R clearance 10' 1  1mph 2% incline VC for R knee flexion, inc R clearance  TM x10min 1 0mph 4% incline VC for R knee flexion      Stairs                   x4 flights R HR ascending                           Modalities

## 2023-02-23 ENCOUNTER — OFFICE VISIT (OUTPATIENT)
Dept: PHYSICAL THERAPY | Facility: CLINIC | Age: 73
End: 2023-02-23

## 2023-02-23 DIAGNOSIS — Z74.09 IMPAIRED MOBILITY AND ENDURANCE: Primary | ICD-10-CM

## 2023-02-23 DIAGNOSIS — G81.91 RIGHT HEMIPARESIS (HCC): ICD-10-CM

## 2023-02-23 NOTE — PROGRESS NOTES
Discharge Note     Today's date: 2023  Patient name: Tl Webster  : 1950  MRN: [id number]  Referring provider: Valeriano Alves MD  Dx:   Encounter Diagnosis     ICD-10-CM    1  Impaired mobility and endurance  Z74 09       2  Right hemiparesis ([address] )  G81 91         Start Time: 1625  Stop Time: 1648  Total time in clinic (min): 23 minutes    Subjective: Pt reports nothing new since last session  Prepared for discharge  Objective: See treatment diary below      11/10 11/17 12/29 2/2 2/23   FGA 14/30     14/30 15/30 16/30   6 Minute Walk Test (ft):  029 ft (SPO2 98%, HR 97 bpm)  329, LOB at 4 min SPC   NV NT NT   Gait Speed (m/s): Self selected: 6m/12 9s = 0 47 m/s  Fast: 6m/11 5s = 0 52 m/s    Self selected: 0 57m/sec  Fast: 0 63m/sec Self selected:  0 51m/s  Fast: 0 713m/s Self selected: 0 38m/s  Fast: 0 4m/s 0 44m/s self selected    0 45m/s fast   5x Sit To Stand (s): 17 05s    16 16s 13 96s 12 68s 10 36s   ABC         46% 50%   TUG: With SPC: 19 9   Without SPC: 29s    With SPC: 15 91s  With SPC: 19 61s With SPC:  24 55s foot turning in With SPC: 22s          Assessment: Leroy Byrnes has been coming to OP PT for R hemiparesis following a tumor removal  Since initial evaluation, pt has made improvements in balance, gait speed, and LE strength  Pt has met all of her goals  Significant amount of time spent completing patient education on continuation of extensive HEP  Physical handouts provided  At this time, pt will be discharged home with HEP  Pt agreeable  Short Term Goals: In 6 weeks, the patient will:  1  Improve self-selected gait speed by 0 1 m/s to indicate improving gait and decreased risk of falls- MET  2  Decrease time for 5xSTS to <15s to indicate improved LE strength/power and improving functional mobility capacity - MET  3  Perform home exercise program with min(A) or supervision -MET    Long Term Goals: In 12 weeks, the patient will:  1   Improve fast gait speed by 0 2 m/s to indicate decreased risk of falls and improved functional mobility - MET  2  Increase FOTO to greater than predicted value- MET  3  Demonstrate ability to perform home exercise program independently to maintain/continue progress towards goals  -MET  4  Complete 2mWT demonstrating improved cardiovascular endurance  -PARTIALLY    Plan: Continue per plan of care         Precautions:   Past Medical History:   Diagnosis Date   • Arthritis     L shoulder   • Brain compression ([facility] [address] ) 9/11/2020   • Hypercholesteremia    • Pneumonia    Precautions: Fall risk                  12/1 12/8 12/29 1/12 1/26 2/2 2/9 2/16 2/23    Manuals                                                                                                   Neuro Re-Ed                   Pt education on POC       EB EB     EB      Step ups    6" " 30x with RLE and tactile cueing with cones   6" " 30x with RLE and tactile cueing with cones     6" 10x RLE 6" 20x RLE     Step-downs              RLE 10x 4"  RLE 20x 4"     STS                    Weighted step tap                   AAROM RLE eversion/DF with NMES                   Standing hamstring curls    2x10 B/L               Biodex Maze w/o AFO                   Biodex LOS w/o AFO                   Biodex WS                   Biodex random                   Biodex Postural Stability                   Standing on foam w/o brace                   HKM x5 laps 2SH  1UE support, VC R TKE x5 laps 2SH  1UE support, VC R TKE         5 laps  2SH  1UE          Resisted ambulation @ [place]         7# fwd/bwd 4 laps ea     Lat 6# ea 4 laps         PNF                   Balance assessments      EB   NV         Foam step ups   10x3 ea 1UE         2x10 RLE       Hip abd/flexion horse         Simulated      20x with 1UE support   Simulated      20x with 1UE support Simulated      3x10 with 1UE support Simulated      3x10 with 1UE support 2# AW    Ther Ex                   Leg press           DL 85# 3x10     SL 85# 2x10   Standing hip ext              RTB 1x15      Standing hip abd             RTB 1x15      TKE Black TB x30 Black TB x30               Passive stretch for R toes                   R calf stretch w/ towel in long sit              30s x2     Standing calf stretch (runner's stretch"              30s x2      patient education                   Ther Exercise                   Mobility assessments                    Hurdles (fwd/lat)                   Side stepping             3 laps      Resisted ambulation @ [place]       Fwd/bwd walking ecc/conc 3 x2 laps ea              Floor transfer                   Vitals       EB           LAQ 2# AW 5SH 3x10 3#  AW 5SH 3x10   3#AW 3SH 3x10           Heel raises        L SL 2x10     Recumbent bike         10'                  Gait Training                   Emphasis on R knee drive                    TM  TM x10min 1 0mph 4% incline VC for R knee flexion TM x10min 1 0mph 4% incline VC for R knee flexion  TM x10min 1 0mph 5% incline VC for R knee flexion 10' 1  1mph 2 5%incline  10' 1 0mph 2 5% incline   VC for R knee flexion   10' 1 0mph 2% incline VC for R knee flexion, inc R clearance 10' 1  1mph 2% incline VC for R knee flexion, inc R clearance 10' 1  1mph 2% incline VC for R knee flexion, inc R clearance    Stairs                                            Modalities

## 2023-03-30 DIAGNOSIS — G40.109 PARTIAL SYMPTOMATIC EPILEPSY WITH SIMPLE PARTIAL SEIZURES, NOT INTRACTABLE, WITHOUT STATUS EPILEPTICUS (HCC): ICD-10-CM

## 2023-03-30 RX ORDER — LEVETIRACETAM 500 MG/1
TABLET ORAL
Qty: 180 TABLET | Refills: 2 | Status: SHIPPED | OUTPATIENT
Start: 2023-03-30

## 2023-05-01 ENCOUNTER — OFFICE VISIT (OUTPATIENT)
Dept: PHYSICAL THERAPY | Age: 73
End: 2023-05-01

## 2023-05-01 DIAGNOSIS — G81.91 HEMIPARESIS OF RIGHT DOMINANT SIDE DUE TO NON-CEREBROVASCULAR ETIOLOGY (HCC): ICD-10-CM

## 2023-05-01 DIAGNOSIS — R53.81 DEBILITY: Primary | ICD-10-CM

## 2023-05-01 NOTE — PROGRESS NOTES
PT Evaluation     Today's date: 2023  Patient name: Elsy Pearl  : 1950  MRN: [id number]  Referring provider: Maria A Nascimento PT  Dx:   Encounter Diagnosis     ICD-10-CM    1  Debility  R53 81       2  Hemiparesis of right dominant side due to non-cerebrovascular etiology (HCC)  G81 91                      Assessment  Assessment details: Elsy Pearl is a 71 y o  female who presents via Direct Access with decreased right U/LE strength, decreased right ankle ROM due to tight heel cord, ambulatory dysfunction and balance dysfunction due to sequelae related to prior history of brain tumor excision  Due to these impairments, Patient has difficulty performing a/iadls, recreational activities and engaging in social activities  Patient's clinical presentation is consistent with the physical therapy diagnosis of debility related to right hemiparesis  Patient would benefit from skilled physical therapy to address the aforementioned impairments, improve her level of function and to improve he overall quality of life  Due to safety concerns, she has been unable to consistently perform her HEP and needs supervison for safety and correct performance technique  Impairments: abnormal gait, abnormal or restricted ROM, activity intolerance, impaired balance, impaired physical strength and lacks appropriate home exercise program  Functional limitations: walking unassisted, heavy household chores, recreational activity (horseback riding)Understanding of Dx/Px/POC: fair   Prognosis: fair    Goals  ST WEEKS  1  Pt will be able to tolerate 45 minutes of exercise to promote strength and endurance  2   Improve 5x sit-sand by 2 sec  LT-8 WEEKS  1  Patient to be able to perform household chores with report of less fatigue and rest periods    2  Pt will be able to safely use exercise equipment in PT facility to progress to fitness program participation by d/c    3  Increase 6 min walk test distance by 21' with quad cane  4  Pt will be able to swing right hip up/over object simulating mounting a horse by d/c  Plan  Plan details: Pt to be seen 1-2x per week due to scheduling issues and progress to independent fitness program if appropriate at conclusion of 8 weeks  Patient would benefit from: skilled physical therapy  Planned therapy interventions: home exercise program, neuromuscular re-education, patient education, strengthening, therapeutic activities, therapeutic exercise, balance/weight bearing training and graded exercise  Frequency: 1-2x per week  Duration in weeks: 8  Plan of Care beginning date: 5/1/2023  Plan of Care expiration date: 8/1/2023  Treatment plan discussed with: patient        Subjective Evaluation    History of Present Illness  Mechanism of injury: Pt had a brain tumor excision in October 2020  She has had prior physical therapy on/off over the past 2 years  She c/o continued weakness on the right side of her body in LE> UE  Her right arm at time feels heavy and gets tired  Her right LE has constant paresthesias  She also c/o paresthesias in her right side of her neck  She is able to dress independently and perform her adls  She does light cooking but does fatigue easily  She is able to load the  and scrub her pots and pans  She does have some difficulty making the bed and lifting the mattress  She is able to vacuum  independently  On occasion she does need help with her MAFO  She walks with a quad cane  She is able to walk slowly without AD  She feels she has lost strength and declined since she stopped physical therapy 2 months ago  She does have goal to be able to move her ankle and toes and not to wear MAFO  We did discuss the history of recovery and plasticity of the nuero system and that this goal may be unrealistic   The patients's goals are to be able to perform her household chores more easily and with less fatigue and to be able to get up on a horse to go horse back riding  Patient Goals  Patient goals for therapy: increased strength  Patient goal: be able to perfrom iadls with less fatigue; mount/ride a horse        Objective     Passive Range of Motion   Left Ankle/Foot    Dorsiflexion (ke): -12 degrees     Strength/Myotome Testing     Left Shoulder     Planes of Motion   Flexion: 4+   Abduction: 4+     Right Shoulder     Planes of Motion   Flexion: 5   Abduction: 5     Left Elbow   Flexion: 5  Extension: 5    Right Elbow   Flexion: 5  Extension: 5    Left Wrist/Hand   Wrist extension: 5  Wrist flexion: 5    Right Wrist/Hand   Wrist extension: 5  Wrist flexion: 5    Left Hip   Planes of Motion   Flexion: 5  Abduction: 5    Right Hip   Planes of Motion   Flexion: 4-  Abduction: 4    Left Knee   Flexion: 5  Extension: 5    Right Knee   Flexion: 4-  Extension: 4+    Left Ankle/Foot   Dorsiflexion: 5  Plantar flexion: 5    Right Ankle/Foot   Dorsiflexion: 0  Plantar flexion: 4-  Inversion: 3-  Eversion: 0  Great toe flexion: 1  Great toe extension: 0    Additional Strength Details  Toe flexion 1/5  Toe ext 0/5    Pt appears to have flexion synergy     Ambulation   Weight-Bearing Status   Assistive device used: quad cane    Additional Weight-Bearing Status Details  Pt wears MAFO for ambulation out of home but can walk in home without for short distances     Neuro Exam:     Sensation   Light touch LE: left WNL and right WNL  Sharp/fdull LE: left WNL and right WNL    Coordination   Finger to nose: left WNL and right WNL  Rapid alternating movements: UE WNL    Transfers Sit to stand: independent Sit to supine: independent Supine to sit: independent Into the car: independent Out of the car: independent     Functional outcomes   6 minute walk test: 590'  5x sit to stand: 14 56 (seconds)  TU 49 (seconds)  Functional outcome comment: Pt did not use UE for 5x sit stand  TUG no ad 31 49  TUG with quad cane 17 70             Precautions: fall risk     TherEx  SLR with cuing             bridging                                       cybex hip ABD 20#            cybex leg press NV            Nustep for LE strength and endurance 10' L4                         Seated bicep NV            Seated tricep NV            row NV                         Squat at railing             Side step railing             Standing march at railing                          Step up             Lateral step up                                                                                           Gait Training                                       Modalities

## 2023-05-01 NOTE — LETTER
May 1, 2023    Elvira Barajas PT    Patient: Dain Espinoza   YOB: 1950   Date of Visit: 2023     Encounter Diagnosis     ICD-10-CM    1  Debility  R53 81       2  Hemiparesis of right dominant side due to non-cerebrovascular etiology [facility])  G81 91           Dear Dr Velasquez Her: Thank you for your recent referral of Dain Espinoza  Please review the attached evaluation summary from Izabela's recent visit  Please verify that you agree with the plan of care by signing the attached order  If you have any questions or concerns, please do not hesitate to call  I sincerely appreciate the opportunity to share in the care of one of your patients and hope to have another opportunity to work with you in the near future  Sincerely,    Elvira Barajas PT      Referring Provider:      I certify that I have read the below Plan of Care and certify the need for these services furnished under this plan of treatment while under my care  Elvira Barajas PT  [facility] [place]          PT Evaluation     Today's date: 2023  Patient name: Dain Espinoza  : 1950  MRN: [id number]  Referring provider: Lizzy Lal PT  Dx:   Encounter Diagnosis     ICD-10-CM    1  Debility  R53 81       2  Hemiparesis of right dominant side due to non-cerebrovascular etiology (HCC)  G81 91                      Assessment  Assessment details: Dain Espinoza is a 71 y o  female who presents via Direct Access with decreased right U/LE strength, decreased right ankle ROM due to tight heel cord, ambulatory dysfunction and balance dysfunction due to sequelae related to prior history of brain tumor excision  Due to these impairments, Patient has difficulty performing a/iadls, recreational activities and engaging in social activities  Patient's clinical presentation is consistent with the physical therapy diagnosis of debility related to right hemiparesis   Patient would benefit from skilled physical therapy to address the aforementioned impairments, improve her level of function and to improve he overall quality of life  Due to safety concerns, she has been unable to consistently perform her HEP and needs supervison for safety and correct performance technique  Impairments: abnormal gait, abnormal or restricted ROM, activity intolerance, impaired balance, impaired physical strength and lacks appropriate home exercise program  Functional limitations: walking unassisted, heavy household chores, recreational activity (horseback riding)Understanding of Dx/Px/POC: fair   Prognosis: fair    Goals  ST WEEKS  1  Pt will be able to tolerate 45 minutes of exercise to promote strength and endurance  2   Improve 5x sit-sand by 2 sec  LT-8 WEEKS  1  Patient to be able to perform household chores with report of less fatigue and rest periods  2  Pt will be able to safely use exercise equipment in PT facility to progress to fitness program participation by d/c    3  Increase 6 min walk test distance by 20' with quad cane  4  Pt will be able to swing right hip up/over object simulating mounting a horse by d/c  Plan  Plan details: Pt to be seen 1-2x per week due to scheduling issues and progress to independent fitness program if appropriate at conclusion of 8 weeks  Patient would benefit from: skilled physical therapy  Planned therapy interventions: home exercise program, neuromuscular re-education, patient education, strengthening, therapeutic activities, therapeutic exercise, balance/weight bearing training and graded exercise  Frequency: 1-2x per week  Duration in weeks: 8  Plan of Care beginning date: 2023  Plan of Care expiration date: 2023  Treatment plan discussed with: patient        Subjective Evaluation    History of Present Illness  Mechanism of injury: Pt had a brain tumor excision in 2020  She has had prior physical therapy on/off over the past 2 years   She c/o continued weakness on the right side of her body in LE> UE  Her right arm at time feels heavy and gets tired  Her right LE has constant paresthesias  She also c/o paresthesias in her right side of her neck  She is able to dress independently and perform her adls  She does light cooking but does fatigue easily  She is able to load the  and scrub her pots and pans  She does have some difficulty making the bed and lifting the mattress  She is able to vacuum  independently  On occasion she does need help with her MAFO  She walks with a quad cane  She is able to walk slowly without AD  She feels she has lost strength and declined since she stopped physical therapy 2 months ago  She does have goal to be able to move her ankle and toes and not to wear MAFO  We did discuss the history of recovery and plasticity of the nuero system and that this goal may be unrealistic  The patients's goals are to be able to perform her household chores more easily and with less fatigue and to be able to get up on a horse to go horse back riding     Patient Goals  Patient goals for therapy: increased strength  Patient goal: be able to perfrom iadls with less fatigue; mount/ride a horse        Objective     Passive Range of Motion   Left Ankle/Foot    Dorsiflexion (ke): -12 degrees     Strength/Myotome Testing     Left Shoulder     Planes of Motion   Flexion: 4+   Abduction: 4+     Right Shoulder     Planes of Motion   Flexion: 5   Abduction: 5     Left Elbow   Flexion: 5  Extension: 5    Right Elbow   Flexion: 5  Extension: 5    Left Wrist/Hand   Wrist extension: 5  Wrist flexion: 5    Right Wrist/Hand   Wrist extension: 5  Wrist flexion: 5    Left Hip   Planes of Motion   Flexion: 5  Abduction: 5    Right Hip   Planes of Motion   Flexion: 4-  Abduction: 4    Left Knee   Flexion: 5  Extension: 5    Right Knee   Flexion: 4-  Extension: 4+    Left Ankle/Foot   Dorsiflexion: 5  Plantar flexion: 5    Right Ankle/Foot Dorsiflexion: 0  Plantar flexion: 4-  Inversion: 3-  Eversion: 0  Great toe flexion: 1  Great toe extension: 0    Additional Strength Details  Toe flexion 1/5  Toe ext 0/5    Pt appears to have flexion synergy     Ambulation   Weight-Bearing Status   Assistive device used: quad cane    Additional Weight-Bearing Status Details  Pt wears MAFO for ambulation out of home but can walk in home without for short distances     Neuro Exam:     Sensation   Light touch LE: left WNL and right WNL  Sharp/fdull LE: left WNL and right WNL    Coordination   Finger to nose: left WNL and right WNL  Rapid alternating movements: UE WNL    Transfers Sit to stand: independent Sit to supine: independent Supine to sit: independent Into the car: independent Out of the car: independent     Functional outcomes   6 minute walk test: 590'  5x sit to stand: 14 56 (seconds)  TU 49 (seconds)  Functional outcome comment: Pt did not use UE for 5x sit stand  TUG no ad 31 49  TUG with quad cane 17 70            Precautions: fall risk     TherEx 5/1            SLR with cuing             bridging                                       cybex hip ABD 20#            cybex leg press NV            Nustep for LE strength and endurance 10' L4                         Seated bicep NV            Seated tricep NV            row NV                         Squat at railing             Side step railing             Standing march at railing                          Step up             Lateral step up                                                                                           Gait Training                                       Modalities

## 2023-05-15 ENCOUNTER — APPOINTMENT (OUTPATIENT)
Dept: PHYSICAL THERAPY | Age: 73
End: 2023-05-15
Payer: COMMERCIAL

## 2023-05-22 ENCOUNTER — OFFICE VISIT (OUTPATIENT)
Dept: PHYSICAL THERAPY | Age: 73
End: 2023-05-22

## 2023-05-22 DIAGNOSIS — R53.81 DEBILITY: Primary | ICD-10-CM

## 2023-05-22 DIAGNOSIS — G81.91 HEMIPARESIS OF RIGHT DOMINANT SIDE DUE TO NON-CEREBROVASCULAR ETIOLOGY (HCC): ICD-10-CM

## 2023-05-22 NOTE — PROGRESS NOTES
"Daily Note     Today's date: 2023  Patient name: Pallavi Kirk  : 1950  MRN: [id number]  Referring provider: Nivia Naranjo, PT  Dx:   Encounter Diagnosis     ICD-10-CM    1  Debility  R53 81       2  Hemiparesis of right dominant side due to non-cerebrovascular etiology ([facility] [address] )  G81 91           Start Time: 1800  Stop Time: 1850  Total time in clinic (min): 50 minutes    Subjective: Patient reports she was sick and not able to come to PT  No pain in muscles  Objective: See treatment diary below      Assessment: Tolerated treatment well  Patient is motivated  Challenged with balancing while doing standing exercises  Ambulating with [employer], not driving  Circumduct's RLE with [employer] and ambulation  Cues for proper exercise technique  Patient had MAFO on RLE today for entire program  She would like to work on her balance and ankle strength and movement  Plan: Continue per plan of care        Precautions: fall risk     TherEx            R SLR with cuing  3x10           bridging  30x                                     cybex hip ABD 20# 20# 30x           cybex leg press NV 60# 30x           Nustep for LE strength and endurance 10' L4 10' L4                         bicep cybex NV 15# 30x            tricep cybex NV 25# 30x           Row, shld extension NV Blue 30x                        Squat at railing  30x           Side step railing             Standing march at [address] /  toe taps  30x 6\"           STS  30x           Step up             Lateral step up                                                                                           Gait Training                                       Modalities                                            "

## 2023-05-25 ENCOUNTER — OFFICE VISIT (OUTPATIENT)
Dept: PHYSICAL THERAPY | Age: 73
End: 2023-05-25

## 2023-05-25 DIAGNOSIS — G81.91 HEMIPARESIS OF RIGHT DOMINANT SIDE DUE TO NON-CEREBROVASCULAR ETIOLOGY (HCC): ICD-10-CM

## 2023-05-25 DIAGNOSIS — R53.81 DEBILITY: Primary | ICD-10-CM

## 2023-05-25 NOTE — PROGRESS NOTES
"Daily Note     Today's date: 2023  Patient name: Cee Liriano  : 1950  MRN: [id number]  Referring provider: Stas Aviles, PT  Dx:   Encounter Diagnosis     ICD-10-CM    1  Debility  R53 81       2  Hemiparesis of right dominant side due to non-cerebrovascular etiology (HCC)  G81 91                      Subjective: Patient noted she is feeling \" so far so good  \"       Objective: See treatment diary below      Assessment: Tolerated treatment fair  Patient was able to perform listed exercises  VC for patient to correct form to perform squats properly  Patient would benefit from continued PT  Plan: Continue per plan of care        Precautions: fall risk     TherEx           R SLR with cuing  3x10 3x10          bridging  30x 30 x                                     cybex hip ABD 20# 20# 30x 20# 30x           cybex leg press NV 60# 30x 60# 30x           Nustep for LE strength and endurance 10' L4 10' L4 10' L4                         bicep cybex NV 15# 30x 15 # 30x           tricep cybex NV 25# 30x           Row, shld extension NV Blue 30x Blue 30x                        Squat at railing  30x 30 x           Side step railing             Standing march at [address] /  toe taps  30x 6\" 30 x           STS  30x 30x           Step up             Lateral step up                                                                                           Gait Training                                       Modalities                                            "

## 2023-05-30 ENCOUNTER — OFFICE VISIT (OUTPATIENT)
Dept: PHYSICAL THERAPY | Age: 73
End: 2023-05-30

## 2023-05-30 DIAGNOSIS — R53.81 DEBILITY: Primary | ICD-10-CM

## 2023-05-30 DIAGNOSIS — G81.91 HEMIPARESIS OF RIGHT DOMINANT SIDE DUE TO NON-CEREBROVASCULAR ETIOLOGY (HCC): ICD-10-CM

## 2023-05-30 NOTE — PROGRESS NOTES
"Daily Note     Today's date: 2023  Patient name: Jones Zaragoza  : 1950  MRN: [id number]  Referring provider: Aminata Olguin PT  Dx:   Encounter Diagnosis     ICD-10-CM    1  Debility  R53 81       2  Hemiparesis of right dominant side due to non-cerebrovascular etiology ([address] )  G81 91           Start Time: 1300  Stop Time: 1345  Total time in clinic (min): 45 minutes    Subjective: Pt offers now new complaints  Objective: See treatment diary below      Assessment: removed MAFO for Nustep and squats to allow for ankle mobility and passive stretch  Pt has difficulty walking without MAFO and less LE control which in creases her fall risk  Balance  Challenged by standing UE exs  Added hip clearing activity  Pt fatigued by conclusion of session  Plan: Continue per plan of care        Precautions: fall risk     TherEx          R SLR with cuing  3x10 3x10 3x10         bridging  30x 30 x  30x                                   cybex hip ABD 20# 20# 30x 20# 30x  20#/30x         cybex hip ADD    20#/ 30         cybex leg press NV 60# 30x 60# 30x  70#/ 30         Nustep for LE strength and endurance 10' L4 10' L4 10' L4  10'                       bicep cybex NV 15# 30x 15 # 30x 20#/ 30          tricep cybex NV 25# 30x  25#/ 30         Row, shld extension NV Blue 30x Blue 30x                        Squat at railing  30x 30 x  30x         Side step railing    10x no brace         Standing march at [address] /  toe taps  30x 6\" 30 x           STS  30x 30x           Step up             Lateral step up                          Seated lg lift up/over bolster     10x                                                             Gait Training                                       Modalities                                            "

## 2023-06-01 ENCOUNTER — APPOINTMENT (OUTPATIENT)
Dept: PHYSICAL THERAPY | Age: 73
End: 2023-06-01
Payer: COMMERCIAL

## 2023-06-02 ENCOUNTER — OFFICE VISIT (OUTPATIENT)
Dept: PHYSICAL THERAPY | Age: 73
End: 2023-06-02

## 2023-06-02 DIAGNOSIS — R53.81 DEBILITY: Primary | ICD-10-CM

## 2023-06-02 DIAGNOSIS — G81.91 HEMIPARESIS OF RIGHT DOMINANT SIDE DUE TO NON-CEREBROVASCULAR ETIOLOGY (HCC): ICD-10-CM

## 2023-06-02 NOTE — PROGRESS NOTES
"Daily Note     Today's date: 2023  Patient name: Kendall Kowalski  : 1950  MRN: [id number]  Referring provider: Mo Merida PT  Dx:   Encounter Diagnosis     ICD-10-CM    1  Debility  R53 81       2  Hemiparesis of right dominant side due to non-cerebrovascular etiology ([facility] [address] )  G81 91           Start Time: 1100  Stop Time: 1150  Total time in clinic (min): 50 minutes    Subjective: Pt reports she is walking in her driveway  Objective: See treatment diary below      Assessment: Added step ups, SL leg press to work on right quad control and hip flexion to chair to simulate lifting in order to get on horse/motorcycle  Plan: Continue per plan of care        Precautions: fall risk     TherEx         R SLR with cuing  3x10 3x10 3x10         bridging  30x 30 x  30x                                   cybex hip ABD 20# 20# 30x 20# 30x  20#/30x 20#/30        cybex hip ADD    20#/ 30 30#/ 30        cybex leg press NV 60# 30x 60# 30x  70#/ 30 70#/ 30        Leg press SL      30#/ 30        Nustep for LE strength and endurance 10' L4 10' L4 10' L4  10' 10'                      bicep cybex NV 15# 30x 15 # 30x 20#/ 30 20#/30         tricep cybex NV 25# 30x  25#/ 30 25#/30        Row, shld extension NV Blue 30x Blue 30x   cybex row 20#                     Squat at railing  30x 30 x  30x 30x        Side step railing    10x no brace 10x        Standing march at [address] /  toe taps  30x 6\" 30 x   20x touch chair        STS  30x 30x           Step up     20x        Lateral step up                          Seated lg lift up/over bolster     10x 10x                                                            Gait Training                                       Modalities                                            "

## 2023-06-03 ENCOUNTER — TELEPHONE (OUTPATIENT)
Dept: OTHER | Facility: OTHER | Age: 73
End: 2023-06-03

## 2023-06-05 ENCOUNTER — APPOINTMENT (OUTPATIENT)
Dept: PHYSICAL THERAPY | Age: 73
End: 2023-06-05
Payer: COMMERCIAL

## 2023-06-09 ENCOUNTER — OFFICE VISIT (OUTPATIENT)
Dept: PHYSICAL THERAPY | Age: 73
End: 2023-06-09
Payer: COMMERCIAL

## 2023-06-09 DIAGNOSIS — R53.81 DEBILITY: Primary | ICD-10-CM

## 2023-06-09 DIAGNOSIS — G81.91 HEMIPARESIS OF RIGHT DOMINANT SIDE DUE TO NON-CEREBROVASCULAR ETIOLOGY (HCC): ICD-10-CM

## 2023-06-09 PROCEDURE — 97110 THERAPEUTIC EXERCISES: CPT

## 2023-06-09 NOTE — PROGRESS NOTES
"Daily Note     Today's date: 2023  Patient name: Rufus Post  : 1950  MRN: [id number]  Referring provider: Ana Rosa Parker, PT  Dx:   Encounter Diagnosis     ICD-10-CM    1  Debility  R53 81       2  Hemiparesis of right dominant side due to non-cerebrovascular etiology (HCC)  G81 91                      Subjective: Patient reports her toes are curling more  She wants to bring her NMES next visit because she does not remember how to use it  Objective: See treatment diary below      Assessment: Tolerated treatment well  No issues with charted program  Occasional CS for safety with standing unsupported TE  Patient demonstrated fatigue post treatment and would benefit from continued PT      Plan: Continue per plan of care        Precautions: fall risk     TherEx        R SLR with cuing  3x10 3x10 3x10         bridging  30x 30 x  30x                                   cybex hip ABD 20# 20# 30x 20# 30x  20#/30x 20#/30 20# 30x       cybex hip ADD    20#/ 30 30#/ 30 30# 30x       cybex leg press NV 60# 30x 60# 30x  70#/ 30 70#/ 30 70# 30x       Leg press SL      30#/ 30 30# 30x        Nustep for LE strength and endurance 10' L4 10' L4 10' L4  10' 10' 10 L6                     bicep cybex NV 15# 30x 15 # 30x 20#/ 30 20#/30 20# 30x        tricep cybex NV 25# 30x  25#/ 30 25#/30 25# 30x       Row, shld extension NV Blue 30x Blue 30x   cybex row 20# cybex row 20#                     Squat at railing  30x 30 x  30x 30x 30x        Side step railing    10x no brace 10x 10x in MAFO       Standing march at [address] /  toe taps  30x 6\" 30 x   20x touch chair        STS  30x 30x    10x       Step up     20x 20x ea 4\"       Lateral step up                          Seated lg lift up/over bolster     10x 10x 20x                                                           Gait Training                                       Modalities                                            "

## 2023-06-12 ENCOUNTER — OFFICE VISIT (OUTPATIENT)
Dept: PHYSICAL THERAPY | Age: 73
End: 2023-06-12
Payer: COMMERCIAL

## 2023-06-12 DIAGNOSIS — G81.91 HEMIPARESIS OF RIGHT DOMINANT SIDE DUE TO NON-CEREBROVASCULAR ETIOLOGY (HCC): ICD-10-CM

## 2023-06-12 DIAGNOSIS — R53.81 DEBILITY: Primary | ICD-10-CM

## 2023-06-12 PROCEDURE — 97110 THERAPEUTIC EXERCISES: CPT | Performed by: PHYSICAL THERAPIST

## 2023-06-12 NOTE — PROGRESS NOTES
"Daily Note     Today's date: 2023  Patient name: Burt Ramos  : 1950  MRN: [id number]  Referring provider: Siva Bradford, PT  Dx:   Encounter Diagnosis     ICD-10-CM    1  Debility  R53 81       2  Hemiparesis of right dominant side due to non-cerebrovascular etiology ([facility] [address] )  G81 91           Start Time: 1800  Stop Time: 1850  Total time in clinic (min): 50 minutes    Subjective: Pt reports she went on a motorcycle ride yesterday as a passenger  Objective: See treatment diary below      Assessment: Pt fatigued by conclusion of session  Min A on/off equipment for right LE  Improved standing balance during exercises  Plan: Continue per plan of care        Precautions: fall risk     TherEx        R SLR with cuing  3x10 3x10 3x10         bridging  30x 30 x  30x                                   cybex hip ABD 20# 20# 30x 20# 30x  20#/30x 20#/30 20# 30x       cybex hip ADD    20#/ 30 30#/ 30 30# 30x       cybex leg press NV 60# 30x 60# 30x  70#/ 30 70#/ 30 70# 30x       Leg press SL      30#/ 30 30# 30x        Nustep for LE strength and endurance 10' L4 10' L4 10' L4  10' 10' 10 L6                     bicep cybex NV 15# 30x 15 # 30x 20#/ 30 20#/30 20# 30x        tricep cybex NV 25# 30x  25#/ 30 25#/30 25# 30x       Row, shld extension NV Blue 30x Blue 30x   cybex row 20# cybex row 20#                     Squat at railing  30x 30 x  30x 30x 30x        Side step railing    10x no brace 10x 10x in MAFO       Standing march at [address] /  toe taps  30x 6\" 30 x   20x touch chair        STS  30x 30x    10x       Step up     20x 20x ea 4\"       Lateral step up                          Seated lg lift up/over bolster     10x 10x 20x                                                           Gait Training                                       Modalities                                            "

## 2023-06-16 ENCOUNTER — OFFICE VISIT (OUTPATIENT)
Dept: PHYSICAL THERAPY | Age: 73
End: 2023-06-16
Payer: COMMERCIAL

## 2023-06-16 DIAGNOSIS — R53.81 DEBILITY: Primary | ICD-10-CM

## 2023-06-16 DIAGNOSIS — G81.91 HEMIPARESIS OF RIGHT DOMINANT SIDE DUE TO NON-CEREBROVASCULAR ETIOLOGY (HCC): ICD-10-CM

## 2023-06-16 PROCEDURE — 97110 THERAPEUTIC EXERCISES: CPT

## 2023-06-16 NOTE — PROGRESS NOTES
"Daily Note     Today's date: 2023  Patient name: Stephie Rebolledo  : 1950  MRN: [id number]  Referring provider: Cash Milan PT  Dx:   Encounter Diagnosis     ICD-10-CM    1  Debility  R53 81       2  Hemiparesis of right dominant side due to non-cerebrovascular etiology ([facility] [address] )  G81 91                      Subjective: Patient had f/u with foot and ankle doctor yesterday, notes some improvements in ROM and strength  Objective: See treatment diary below      Assessment: Good tolerance to outlined TE program  VC provided to reduce heel lift during squats  No c/o post session  Plan: Continue per plan of care        Precautions: fall risk     TherEx       R SLR with cuing  3x10 3x10 3x10         bridging  30x 30 x  30x                                   cybex hip ABD 20# 20# 30x 20# 30x  20#/30x 20#/30 20# 30x 20#      cybex hip ADD    20#/ 30 30#/ 30 30# 30x 30# 30x      cybex leg press NV 60# 30x 60# 30x  70#/ 30 70#/ 30 70# 30x 70# 30x      Leg press SL      30#/ 30 30# 30x  30x 30#      Nustep for LE strength and endurance 10' L4 10' L4 10' L4  10' 10' 10 L6 10 L6                    bicep cybex NV 15# 30x 15 # 30x 20#/ 30 20#/30 20# 30x 25# 30x       tricep cybex NV 25# 30x  25#/ 30 25#/30 25# 30x 25# 30x      Row, shld extension NV Blue 30x Blue 30x   cybex row 20# cybex row 20#  cybex row 20#                   Squat at railing  30x 30 x  30x 30x 30x  30x      Side step railing    10x no brace 10x 10x in MAFO 10x in MAFO      Standing march at [address] /  toe taps  30x 6\" 30 x   20x touch chair  20x 4\"       STS  30x 30x    10x 10x No UE      Step up     20x 20x ea 4\" 20x ea       Lateral step up                          Seated lg lift up/over bolster     10x 10x 20x 30x                                                          Gait Training                                       Modalities                                            "

## 2023-06-19 ENCOUNTER — OFFICE VISIT (OUTPATIENT)
Dept: PHYSICAL THERAPY | Age: 73
End: 2023-06-19
Payer: COMMERCIAL

## 2023-06-19 DIAGNOSIS — R53.81 DEBILITY: Primary | ICD-10-CM

## 2023-06-19 DIAGNOSIS — G81.91 HEMIPARESIS OF RIGHT DOMINANT SIDE DUE TO NON-CEREBROVASCULAR ETIOLOGY (HCC): ICD-10-CM

## 2023-06-19 PROCEDURE — 97110 THERAPEUTIC EXERCISES: CPT

## 2023-06-19 NOTE — PROGRESS NOTES
"Daily Note     Today's date: 2023  Patient name: Price Patel  : 1950  MRN: [id number]  Referring provider: Angel Hatch, PT  Dx:   Encounter Diagnosis     ICD-10-CM    1  Debility  R53 81       2  Hemiparesis of right dominant side due to non-cerebrovascular etiology (H[facility])  G81 91                      Subjective: \"My knee is bothering me today, it locked up on me earlier  \"       Objective: See treatment diary below  Patient was seen 1:1 for 45 min  Assessment: Tolerated treatment well  Modified resistance on Nustep secondary to report of her R knee bothering her upon arrival  Increased weight for triceps today, provided CS for safety and to ensure she maintained her balance  Fatigued quickly with leg lifts over bolster, required rest breaks every 10 reps  Patient would benefit from continued PT  Plan: Continue per plan of care        Precautions: fall risk     TherEx      R SLR with cuing  3x10 3x10 3x10         bridging  30x 30 x  30x                                   cybex hip ABD 20# 20# 30x 20# 30x  20#/30x 20#/30 20# 30x 20# 30# 30x     cybex hip ADD    20#/ 30 30#/ 30 30# 30x 30# 30x 30# 30x      cybex leg press NV 60# 30x 60# 30x  70#/ 30 70#/ 30 70# 30x 70# 30x 70# 30x     Leg press SL      30#/ 30 30# 30x  30x 30# 30# 30x     Nustep for LE strength and endurance 10' L4 10' L4 10' L4  10' 10' 10 L6 10 L6 L4 13'                    bicep cybex NV 15# 30x 15 # 30x 20#/ 30 20#/30 20# 30x 25# 30x 25# 30x      tricep cybex NV 25# 30x  25#/ 30 25#/30 25# 30x 25# 30x 35# 30x     Row, shld extension NV Blue 30x Blue 30x   cybex row 20# cybex row 20#  cybex row 20# cybex row 20# 30x                  Squat at railing  30x 30 x  30x 30x 30x  30x 30x     Side step railing    10x no brace 10x 10x in MAFO 10x in MAFO 10x in MAFO     Standing march at [address] /  toe taps  30x 6\" 30 x   20x touch chair  20x 4\"  20x 6\" step     STS  30x 30x    10x 10x No UE " "10x no UE     Step up     20x 20x ea 4\" 20x ea  4\" 20x ea     Lateral step up                          Seated leg lift up/over bolster     10x 10x 20x 30x 30x                                                         Gait Training                                       Modalities                                            "

## 2023-06-23 ENCOUNTER — OFFICE VISIT (OUTPATIENT)
Dept: PHYSICAL THERAPY | Age: 73
End: 2023-06-23
Payer: COMMERCIAL

## 2023-06-23 DIAGNOSIS — R53.81 DEBILITY: Primary | ICD-10-CM

## 2023-06-23 DIAGNOSIS — G81.91 HEMIPARESIS OF RIGHT DOMINANT SIDE DUE TO NON-CEREBROVASCULAR ETIOLOGY (HCC): ICD-10-CM

## 2023-06-23 PROCEDURE — 97110 THERAPEUTIC EXERCISES: CPT

## 2023-06-23 NOTE — PROGRESS NOTES
Daily Note     Today's date: 2023  Patient name: Efraín Michel  : 1950  MRN: [id number]  Referring provider: Nico Dugan, PT  Dx:   Encounter Diagnosis     ICD-10-CM    1  Debility  R53 81       2  Hemiparesis of right dominant side due to non-cerebrovascular etiology ([facility] [address] )  G81 91           Start Time: 1115  Stop Time: 1215  Total time in clinic (min): 60 minutes    Subjective: Patient reports 0/10 pain and no new complaints  Objective: See treatment diary below      Assessment: Tolerated treatment well  Patient participated in skilled PT session focused on strengthening, stretching, and ROM  Continues to fatigue quickly with leg lifts over bolster, required rest breaks every 10 reps  Patient able to complete exercise program with no sx  Patient would continue to benefit from skilled PT interventions to address strengthening, stretching, and ROM  Patient demonstrated fatigue post treatment      Plan: Continue per plan of care        Precautions: fall risk     TherEx     R SLR with cuing  3x10 3x10 3x10         bridging  30x 30 x  30x                                   cybex hip ABD 20# 20# 30x 20# 30x  20#/30x 20#/30 20# 30x 20# 30# 30x 30# 30x    cybex hip ADD    20#/ 30 30#/ 30 30# 30x 30# 30x 30# 30x  30# 30x    cybex leg press NV 60# 30x 60# 30x  70#/ 30 70#/ 30 70# 30x 70# 30x 70# 30x 70# 30x    Leg press SL      30#/ 30 30# 30x  30x 30# 30# 30x 30# 30x    Nustep for LE strength and endurance 10' L4 10' L4 10' L4  10' 10' 10 L6 10 L6 L4 13'  L4                   bicep cybex NV 15# 30x 15 # 30x 20#/ 30 20#/30 20# 30x 25# 30x 25# 30x 25# 30x     tricep cybex NV 25# 30x  25#/ 30 25#/30 25# 30x 25# 30x 35# 30x 35# 30x    Row, shld extension NV Blue 30x Blue 30x   cybex row 20# cybex row 20#  cybex row 20# cybex row 20# 30x Cybex row 25# 30                 Squat at railing  30x 30 x  30x 30x 30x  30x 30x     Side step railing    10x no brace 10x "10x in MAFO 10x in MAFO 10x in MAFO 10x in MAFO    Standing march at [address] /  toe taps  30x 6\" 30 x   20x touch chair  20x 4\"  20x 6\" step 6\" step 20x    STS  30x 30x    10x 10x No UE 10x no UE 10x no UE     Step up     20x 20x ea 4\" 20x ea  4\" 20x ea 4\" 20x ea    Lateral step up                          Seated leg lift up/over bolster     10x 10x 20x 30x 30x 30x                                                        Gait Training                                       Modalities                                            "

## 2023-06-26 ENCOUNTER — APPOINTMENT (OUTPATIENT)
Dept: PHYSICAL THERAPY | Age: 73
End: 2023-06-26
Payer: COMMERCIAL

## 2023-06-30 ENCOUNTER — OFFICE VISIT (OUTPATIENT)
Dept: PHYSICAL THERAPY | Age: 73
End: 2023-06-30
Payer: COMMERCIAL

## 2023-06-30 DIAGNOSIS — G81.91 HEMIPARESIS OF RIGHT DOMINANT SIDE DUE TO NON-CEREBROVASCULAR ETIOLOGY (HCC): ICD-10-CM

## 2023-06-30 DIAGNOSIS — R53.81 DEBILITY: Primary | ICD-10-CM

## 2023-06-30 PROCEDURE — 97110 THERAPEUTIC EXERCISES: CPT | Performed by: PHYSICAL THERAPIST

## 2023-06-30 NOTE — PROGRESS NOTES
Daily Note     Today's date: 2023  Patient name: Augustina Elizabeth  : 1950  MRN: [id number]  Referring provider: Lynn Mullins, PT  Dx:   Encounter Diagnosis     ICD-10-CM    1  Debility  R53 81       2  Hemiparesis of right dominant side due to non-cerebrovascular etiology ([facility] [address] )  G81 91           Start Time: 1115  Stop Time: 1210  Total time in clinic (min): 55 minutes    Subjective: Pt reports she feels good with all the fitness machines  She feels stronger since using new equipment  Objective: See treatment diary below      Assessment: At this time, Pt is safe to transition to independent fitness program      Goals  ST WEEKS  1  Pt will be able to tolerate 45 minutes of exercise to promote strength and endurance  MET  2  Improve 5x sit-sand by 2 sec  MET    LT-8 WEEKS  1  Patient to be able to perform household chores with report of less fatigue and rest periods  MET  2  Pt will be able to safely use exercise equipment in PT facility to progress to fitness program participation by d/c  MET  3  Increase 6 min walk test distance by 20' with quad cane     Plan: Discharge to independent fitness program       Precautions: fall risk     TherEx  6/   R SLR with cuing  3x10 3x10 3x10         bridging  30x 30 x  30x                                   cybex hip ABD 20# 20# 30x 20# 30x  20#/30x 20#/30 20# 30x 20# 30# 30x 30# 30x 30#   cybex hip ADD    20#/ 30 30#/ 30 30# 30x 30# 30x 30# 30x  30# 30x 30#   cybex leg press NV 60# 30x 60# 30x  70#/ 30 70#/ 30 70# 30x 70# 30x 70# 30x 70# 30x 70#   Leg press SL      30#/ 30 30# 30x  30x 30# 30# 30x 30# 30x 30#   Nustep for LE strength and endurance 10' L4 10' L4 10' L4  10' 10' 10 L6 10 L6 L4 13'  L4  10'                 bicep cybex NV 15# 30x 15 # 30x 20#/ 30 20#/30 20# 30x 25# 30x 25# 30x 25# 30x 25#    tricep cybex NV 25# 30x  25#/ 30 25#/30 25# 30x 25# 30x 35# 30x 35# 30x 35#   Row, [facility] extension NV "Blue 30x Blue 30x   cybex row 20# cybex row 20#  cybex row 20# cybex row 20# 30x Cybex row 25# 30 30#   Leg press          70#  DL  30#  SL   Squat at railing  30x 30 x  30x 30x 30x  30x 30x  30x   Side step railing    10x no brace 10x 10x in MAFO 10x in MAFO 10x in MAFO 10x in MAFO 10x   Standing march at [address] /  toe taps  30x 6\" 30 x   20x touch chair  20x 4\"  20x 6\" step 6\" step 20x    STS  30x 30x    10x 10x No UE 10x no UE 10x no UE     Step up     20x 20x ea 4\" 20x ea  4\" 20x ea 4\" 20x ea 20x   Lateral step up                          Seated leg lift up/over bolster     10x 10x 20x 30x 30x 30x                                                        Gait Training                                       Modalities                                            "

## 2023-07-06 ENCOUNTER — TELEPHONE (OUTPATIENT)
Dept: FAMILY MEDICINE CLINIC | Facility: CLINIC | Age: 73
End: 2023-07-06

## 2023-10-11 DIAGNOSIS — Z91.041 CONTRAST MEDIA ALLERGY: Primary | ICD-10-CM

## 2023-10-11 RX ORDER — PREDNISONE 50 MG/1
TABLET ORAL
Qty: 3 TABLET | Refills: 0 | Status: SHIPPED | OUTPATIENT
Start: 2023-10-11

## 2023-10-11 NOTE — TELEPHONE ENCOUNTER
Received a call from patient requesting a call back looking to schedule her annual follow up. Returned call to patient and advised she is already set up for both her MRI and f/u appt. Provided her with the appt information. She also stated she will need to be premedicated for contrast allergy. Advised this RN will route to the provider to sign. Confirmed her preferred pharmacy. She was appreciative of the call back.

## 2023-10-12 DIAGNOSIS — G40.109 PARTIAL SYMPTOMATIC EPILEPSY WITH SIMPLE PARTIAL SEIZURES, NOT INTRACTABLE, WITHOUT STATUS EPILEPTICUS (HCC): ICD-10-CM

## 2023-10-12 RX ORDER — LEVETIRACETAM 500 MG/1
500 TABLET ORAL EVERY 12 HOURS
Qty: 180 TABLET | Refills: 2 | Status: SHIPPED | OUTPATIENT
Start: 2023-10-12

## 2023-10-12 NOTE — TELEPHONE ENCOUNTER
10/11 12:07    Pt left a VM requesting a refill of Levetiracetam.    Transcribed VM:   Yes, this is Don. Date of birth [birth year]. I am calling about my prescription. I usually pick it up at [facility]. I'd like to have it transferred to the [facility] store over in [facility] to pick it up. It's the Levetiracetam. I usually get 3 months of supply. If you could forward it from [facility] to over [facility] at [facility]. Thank you. Called to inform pt that her request would be forwarded to the provider and left a VM with call back # if any further assistance is required. Please sign off if agreeable. Thank you.

## 2023-10-18 ENCOUNTER — TELEPHONE (OUTPATIENT)
Dept: NEUROLOGY | Facility: CLINIC | Age: 73
End: 2023-10-18

## 2023-10-18 NOTE — TELEPHONE ENCOUNTER
Called and spoke w/ patient regarding 6 month f/u w/ Dr. Anastasia Robbins. Offered pt open slots for today 10/18 @4PM but pt is unable to accept due to short notice. Offered pt next avail 11/01 @10:40AM. Pt happily accepted.

## 2023-10-27 NOTE — TELEPHONE ENCOUNTER
Called and LVM for patient regarding confirmation for upcoming appt w/ Dr. Janice Morgan. Provided patient w/ appt time, date, and location.

## 2023-11-01 ENCOUNTER — OFFICE VISIT (OUTPATIENT)
Dept: NEUROLOGY | Facility: CLINIC | Age: 73
End: 2023-11-01
Payer: MEDICARE

## 2023-11-01 VITALS
HEIGHT: 65 IN | SYSTOLIC BLOOD PRESSURE: 110 MMHG | HEART RATE: 71 BPM | DIASTOLIC BLOOD PRESSURE: 80 MMHG | BODY MASS INDEX: 27.96 KG/M2

## 2023-11-01 DIAGNOSIS — D32.9 BENIGN MENINGIOMA (HCC): ICD-10-CM

## 2023-11-01 DIAGNOSIS — G81.91 RIGHT HEMIPARESIS (HCC): ICD-10-CM

## 2023-11-01 DIAGNOSIS — G40.109 PARTIAL SYMPTOMATIC EPILEPSY WITH SIMPLE PARTIAL SEIZURES, NOT INTRACTABLE, WITHOUT STATUS EPILEPTICUS (HCC): Primary | ICD-10-CM

## 2023-11-01 PROCEDURE — 99213 OFFICE O/P EST LOW 20 MIN: CPT | Performed by: PSYCHIATRY & NEUROLOGY

## 2023-11-01 NOTE — PROGRESS NOTES
Brianna Lynn is a 68 y.o. female returns in follow-up today with a history of meningioma with mild right hemiparesis and partial seizures    Assessment:  1. Partial symptomatic epilepsy with simple partial seizures, not intractable, without status epilepticus ([address])    2. Right hemiparesis ([address])    3. Benign meningioma ([address])        Plan:  Renew Keppra  Continue physical therapy exercises  Repeat MRI brain later this month  Follow-up 6 months    Discussion:  Tom Pandya is found that with therapy she is getting more strength and control of her right leg. She continues to report numbness and tingling. She has not had any seizures and remains on Keppra. She is scheduled for repeat brain MRI later this month. She will continue her physical therapy exercises and I will see her back in follow-up in 6 months      Subjective:    CORDELL Pandya returns in follow-up today accompanied by her . She reports that she has been going to physical therapy. She also has a new brace. She is found that her foot still continues to tend to turn in and her toes curl but with the brace it remains straight. With physical therapy she has seen some improvement in the strength and control of her right leg. She is now able to keep her foot on the pedals of her exercise bicycle. She denies any seizures and remains on Keppra. She is scheduled for repeat brain MRI later this month. We reviewed the images from her previous MRI and explained to her why she still has some residual weakness and numbness on the right side. I explained that the likelihood of it being on 100% normal is very unlikely and the longer she goes with residual symptoms the less likely will see significant improvements.       Past Medical History:   Diagnosis Date    Arthritis     L shoulder    Brain compression ([address]) 9/11/2020    Hypercholesteremia     Pneumonia        Family History:  Family History   Problem Relation Age of Onset    No Known Problems Mother     No Known Problems Sister        Past Surgical History:  Past Surgical History:   Procedure Laterality Date     SECTION      [place] CRNEC TREPHINE BONE FLAP MENINGIOMA SUPRATENTOR Left 10/15/2020    Procedure: Image guided left frontoparietal craniotomy for tumor;  Surgeon: Rick Newton MD;  Location: [facility];  Service: Neurosurgery    TUBAL LIGATION         Social History:   reports that she has never smoked. She has never used smokeless tobacco. She reports current alcohol use. She reports that she does not use drugs. Allergies:  Gadolinium derivatives and Trilipix [choline fenofibrate]      Current Outpatient Medications:     diphenhydrAMINE (BENADRYL) 50 MG tablet, Take 1 tablet 1 hour prior to MRI, Disp: 1 tablet, Rfl: 0    levETIRAcetam (KEPPRA) 500 mg tablet, Take 1 tablet (500 mg total) by mouth every 12 (twelve) hours, Disp: 180 tablet, Rfl: 2    predniSONE 50 mg tablet, Take 1 tablet 13 hours prior to MRI, Take 1 tablet 7 hours prior to MRI, Take 1 tablet 1 hour prior to MRI, Disp: 3 tablet, Rfl: 0    I have reviewed the past medical, social and family history, current medications, allergies, vitals, review of systems and updated this information as appropriate today     Objective:    Vitals:  Blood pressure 110/80, pulse 71, height 5' 5" (1.651 m), not currently breastfeeding. Physical Exam    Neurological Exam  GENERAL: Well-developed well-nourished woman in no acute distress  HEENT/NECK: Head is atraumatic normocephalic, neck is supple  NEUROLOGIC:  Mental Status: Awake and alert without aphasia  Cranial Nerves: Extraocular movements are full. Face is symmetrical  Motor: Hemiparesis is noted lower extremity greater than upper extremity  Coordination: She ambulates with a right hemiparetic gait pattern      ROS:    Review of Systems   Constitutional:  Negative for appetite change, fatigue and fever. HENT: Negative.   Negative for hearing loss, tinnitus, trouble swallowing and voice change. Eyes: Negative. Negative for photophobia, pain and visual disturbance. Respiratory: Negative. Negative for shortness of breath. Cardiovascular: Negative. Negative for palpitations. Gastrointestinal: Negative. Negative for nausea and vomiting. Endocrine: Negative. Negative for cold intolerance. Genitourinary: Negative. Negative for dysuria, frequency and urgency. Musculoskeletal:  Negative for back pain, gait problem, myalgias and neck pain. Skin: Negative. Negative for rash. Allergic/Immunologic: Negative. Neurological: Negative. Negative for dizziness, tremors, seizures, syncope, facial asymmetry, speech difficulty, weakness, light-headedness, numbness and headaches. Hematological: Negative. Does not bruise/bleed easily. Psychiatric/Behavioral: Negative. Negative for confusion, hallucinations and sleep disturbance.

## 2023-11-07 ENCOUNTER — OFFICE VISIT (OUTPATIENT)
Dept: FAMILY MEDICINE CLINIC | Facility: CLINIC | Age: 73
End: 2023-11-07
Payer: MEDICARE

## 2023-11-07 VITALS
DIASTOLIC BLOOD PRESSURE: 70 MMHG | BODY MASS INDEX: 26.99 KG/M2 | OXYGEN SATURATION: 98 % | HEART RATE: 67 BPM | SYSTOLIC BLOOD PRESSURE: 116 MMHG | WEIGHT: 162 LBS | HEIGHT: 65 IN

## 2023-11-07 DIAGNOSIS — I69.851 HEMIPLEGIA AND HEMIPARESIS FOLLOWING OTHER CEREBROVASCULAR DISEASE AFFECTING RIGHT DOMINANT SIDE (HCC): ICD-10-CM

## 2023-11-07 DIAGNOSIS — Z00.00 ANNUAL PHYSICAL EXAM: Primary | ICD-10-CM

## 2023-11-07 DIAGNOSIS — G40.909 NONINTRACTABLE EPILEPSY WITHOUT STATUS EPILEPTICUS, UNSPECIFIED EPILEPSY TYPE (HCC): ICD-10-CM

## 2023-11-07 DIAGNOSIS — Z59.9 FINANCIAL DIFFICULTIES: ICD-10-CM

## 2023-11-07 DIAGNOSIS — Z59.82 INABILITY TO ACQUIRE TRANSPORTATION: ICD-10-CM

## 2023-11-07 DIAGNOSIS — Z00.00 MEDICARE ANNUAL WELLNESS VISIT, SUBSEQUENT: ICD-10-CM

## 2023-11-07 PROCEDURE — G0438 PPPS, INITIAL VISIT: HCPCS | Performed by: FAMILY MEDICINE

## 2023-11-07 PROCEDURE — 99213 OFFICE O/P EST LOW 20 MIN: CPT | Performed by: FAMILY MEDICINE

## 2023-11-07 SDOH — ECONOMIC STABILITY - TRANSPORTATION SECURITY: TRANSPORTATION INSECURITY: Z59.82

## 2023-11-07 SDOH — ECONOMIC STABILITY - INCOME SECURITY: PROBLEM RELATED TO HOUSING AND ECONOMIC CIRCUMSTANCES, UNSPECIFIED: Z59.9

## 2023-11-07 NOTE — PROGRESS NOTES
Assessment and Plan:     Problem List Items Addressed This Visit    None    BMI Counseling: Body mass index is 26.96 kg/m². The BMI is above normal. Nutrition recommendations include decreasing portion sizes, decreasing fast food intake, limiting drinks that contain sugar, moderation in carbohydrate intake, increasing intake of lean protein, reducing intake of saturated and trans fat and reducing intake of cholesterol. Exercise recommendations include moderate physical activity 150 minutes/week and exercising 3-5 times per week. No pharmacotherapy was ordered. Rationale for BMI follow-up plan is due to patient being overweight or obese. Depression Screening and Follow-up Plan: Patient was screened for depression during today's encounter. They screened negative with a PHQ-2 score of 0. Preventive health issues were discussed with patient, and age appropriate screening tests were ordered as noted in patient's After Visit Summary. Personalized health advice and appropriate referrals for health education or preventive services given if needed, as noted in patient's After Visit Summary. History of Present Illness:     Patient presents for a Medicare Wellness Visit    Annual  Neurology has been seen md [place] , continues with P /ot program   She has not had any seizures and remains on Keppra.  since last seen has retired   Reviewed age appropriate screening refused  Offered immunizations refused   Understands recommendations  in room   Will obtain updated lipid gluc         Patient Care Team:  BOBBY Saravia as PCP - General (Family Medicine)  Arva Boeck, MD (Neurosurgery)  Alfreda Pineda MD (Radiation Oncology)     Review of Systems:     Review of Systems   Constitutional:  Negative for appetite change, chills, fever and unexpected weight change.    HENT:  Negative for congestion, dental problem, ear pain, hearing loss, postnasal drip, rhinorrhea, sinus pressure, sinus pain, sneezing, sore throat, tinnitus and voice change. Eyes:  Negative for visual disturbance. Respiratory:  Negative for apnea, cough, chest tightness and shortness of breath. Cardiovascular:  Negative for chest pain, palpitations and leg swelling. Gastrointestinal:  Negative for abdominal pain, blood in stool, constipation, diarrhea, nausea and vomiting. Endocrine: Negative for cold intolerance, heat intolerance, polydipsia, polyphagia and polyuria. Genitourinary:  Negative for decreased urine volume, difficulty urinating, dysuria, frequency and hematuria. Musculoskeletal:  Negative for arthralgias, back pain, gait problem, joint swelling and myalgias. Skin:  Negative for color change, rash and wound. Allergic/Immunologic: Negative for environmental allergies and food allergies. Neurological:  Negative for dizziness, syncope, weakness, light-headedness, numbness and headaches. Hematological:  Negative for adenopathy. Does not bruise/bleed easily. Psychiatric/Behavioral:  Negative for sleep disturbance and suicidal ideas. The patient is not nervous/anxious.          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 [facility])      Past Medical and Surgical History:     Past Medical History:   Diagnosis Date    Arthritis     L shoulder    Brain compression ([address]) 2020    Hypercholesteremia     Pneumonia      Past Surgical History:   Procedure Laterality Date     SECTION      NM CRNEC TREPHINE BONE FLAP MENINGIOMA SUPRATENTOR Left 10/15/2020    Procedure: Image guided left frontoparietal craniotomy for tumor;  Surgeon: Taqueria Madison MD;  [facility];  Service: Neurosurgery    TUBAL LIGATION        Family History:     Family History   Problem Relation Age of Onset    No Known Problems Mother     No Known Problems Sister       Social History:     Social History     Socioeconomic History    Marital status: /Civil Union     Spouse name: None    Number of children: None    Years of education: None    Highest education level: None   Occupational History    None   Tobacco Use    Smoking status: Never    Smokeless tobacco: Never   Vaping Use    Vaping Use: Never used   Substance and Sexual Activity    Alcohol use: Yes     Comment: special occasions    Drug use: Never    Sexual activity: Yes   Other Topics Concern    None   Social History Narrative    None     Social Determinants of Health     Financial Resource Strain: Not on file   Food Insecurity: Not on file   Transportation Needs: Not on file   Physical Activity: Not on file   Stress: Not on file   Social Connections: Not on file   Intimate Partner Violence: Not on file   Housing Stability: Not on file      Medications and Allergies:     Current Outpatient Medications   Medication Sig Dispense Refill    diphenhydrAMINE (BENADRYL) 50 MG tablet Take 1 tablet 1 hour prior to MRI 1 tablet 0    levETIRAcetam (KEPPRA) 500 mg tablet Take 1 tablet (500 mg total) by mouth every 12 (twelve) hours 180 tablet 2    predniSONE 50 mg tablet Take 1 tablet 13 hours prior to MRI, Take 1 tablet 7 hours prior to MRI, Take 1 tablet 1 hour prior to MRI 3 tablet 0     No current facility-administered medications for this visit. Allergies   Allergen Reactions    Gadolinium Derivatives Seizures    Trilipix [Choline Fenofibrate] Other (See Comments)     Pain Started in the upper/mid back and radiated into the front (chest area). Pt unaware of this response and is unsure. Immunizations: There is no immunization history on file for this patient.    Health Maintenance:         Topic Date Due    Breast Cancer Screening: Mammogram  Never done    Colorectal Cancer Screening  Never done    Hepatitis C Screening Completed         Topic Date Due    COVID-19 Vaccine (1) Never done    Pneumococcal Vaccine: 65+ Years (1 - PCV) Never done    Influenza Vaccine (1) Never done      Medicare Screening Tests and Risk Assessments:     Herve Sigala is here for her Subsequent Wellness visit. Last Medicare Wellness visit information reviewed, patient interviewed, no change since last AWV. Health Risk Assessment:   Patient rates overall health as good. Patient feels that their physical health rating is slightly better. Patient is satisfied with their life. Eyesight was rated as slightly worse. Hearing was rated as same. Patient feels that their emotional and mental health rating is same. Patients states they are sometimes angry. Patient states they are sometimes unusually tired/fatigued. Pain experienced in the last 7 days has been some. Patient's pain rating has been 7/10. Patient states that she has experienced no weight loss or gain in last 6 months. Depression Screening:   PHQ-2 Score: 0      Fall Risk Screening: In the past year, patient has experienced: no history of falling in past year      Urinary Incontinence Screening:   Patient has not leaked urine accidently in the last six months. Home Safety:  Patient does not have trouble with stairs inside or outside of their home. Patient has working smoke alarms and has working carbon monoxide detector. Nutrition:   Current diet is Regular. Medications:   Patient is currently taking over-the-counter supplements. OTC medications include: see medication list. Patient is able to manage medications. Activities of Daily Living (ADLs)/Instrumental Activities of Daily Living (IADLs):   Walk and transfer into and out of bed and chair?: Yes  Dress and groom yourself?: Yes    Bathe or shower yourself?: Yes    Feed yourself?  Yes  Do your laundry/housekeeping?: Yes  Manage your money, pay your bills and track your expenses?: Yes  Make your own meals?: Yes    Do your own shopping?: Yes    Previous Hospitalizations:   Any hospitalizations or ED visits within the last 12 months?: No      Cognitive Screening:   Provider or family/friend/caregiver concerned regarding cognition?: No    PREVENTIVE SCREENINGS      Cardiovascular Screening:    General: Screening Current      Diabetes Screening:     General: Risks and Benefits Discussed      Colorectal Cancer Screening:     General: Risks and Benefits Discussed and Patient Declines      Breast Cancer Screening:     General: Risks and Benefits Discussed and Patient Declines      Cervical Cancer Screening:    General: Screening Not Indicated      Osteoporosis Screening:    General: Risks and Benefits Discussed and Patient Declines      Abdominal Aortic Aneurysm (AAA) Screening:        General: Risks and Benefits Discussed and Patient Declines      Lung Cancer Screening:     General: Screening Not Indicated      Hepatitis C Screening:    General: Screening Current    Screening, Brief Intervention, and Referral to Treatment (SBIRT)    Screening  Typical number of drinks in a day: 0  Typical number of drinks in a week: 0  Interpretation: Low risk drinking behavior. Single Item Drug Screening:  How often have you used an illegal drug (including marijuana) or a prescription medication for non-medical reasons in the past year? never    Single Item Drug Screen Score: 0  Interpretation: Negative screen for possible drug use disorder    No results found. Physical Exam:     Ht 5' 5" (1.651 m)   BMI 27.96 kg/m²     Physical Exam  Vitals and nursing note reviewed. Constitutional:       General: She is not in acute distress. Appearance: She is well-developed. She is not ill-appearing. HENT:      Head: Normocephalic and atraumatic. Eyes:      Conjunctiva/sclera: Conjunctivae normal.   Cardiovascular:      Rate and Rhythm: Normal rate and regular rhythm. Heart sounds: No murmur heard.   Pulmonary:      Effort: Pulmonary effort is normal. No respiratory distress. Breath sounds: Normal breath sounds. Abdominal:      Palpations: Abdomen is soft. Tenderness: There is no abdominal tenderness. Musculoskeletal:         General: No swelling. Cervical back: Neck supple. Skin:     General: Skin is warm and dry. Capillary Refill: Capillary refill takes less than 2 seconds. Neurological:      Mental Status: She is alert.       Comments: Right sided weakness with right lower bracing , antalgic gait , cane assist    Psychiatric:         Mood and Affect: Mood normal.          BOBBY Singletary

## 2023-11-09 ENCOUNTER — APPOINTMENT (OUTPATIENT)
Dept: LAB | Facility: HOSPITAL | Age: 73
End: 2023-11-09
Payer: MEDICARE

## 2023-11-09 DIAGNOSIS — Z00.00 ANNUAL PHYSICAL EXAM: ICD-10-CM

## 2023-11-09 LAB
ALBUMIN SERPL BCP-MCNC: 4.2 G/DL (ref 3.5–5)
ALP SERPL-CCNC: 69 U/L (ref 34–104)
ALT SERPL W P-5'-P-CCNC: 25 U/L (ref 7–52)
ANION GAP SERPL CALCULATED.3IONS-SCNC: 5 MMOL/L
AST SERPL W P-5'-P-CCNC: 23 U/L (ref 13–39)
BILIRUB SERPL-MCNC: 0.48 MG/DL (ref 0.2–1)
BUN SERPL-MCNC: 18 MG/DL (ref 5–25)
CALCIUM SERPL-MCNC: 9.7 MG/DL (ref 8.4–10.2)
CHLORIDE SERPL-SCNC: 107 MMOL/L (ref 96–108)
CHOLEST SERPL-MCNC: 253 MG/DL
CO2 SERPL-SCNC: 28 MMOL/L (ref 21–32)
CREAT SERPL-MCNC: 0.89 MG/DL (ref 0.6–1.3)
GFR SERPL CREATININE-BSD FRML MDRD: 64 ML/MIN/1.73SQ M
GLUCOSE P FAST SERPL-MCNC: 106 MG/DL (ref 65–99)
HDLC SERPL-MCNC: 49 MG/DL
LDLC SERPL CALC-MCNC: 163 MG/DL (ref 0–100)
POTASSIUM SERPL-SCNC: 4.2 MMOL/L (ref 3.5–5.3)
PROT SERPL-MCNC: 7.5 G/DL (ref 6.4–8.4)
SODIUM SERPL-SCNC: 140 MMOL/L (ref 135–147)
TRIGL SERPL-MCNC: 205 MG/DL
TSH SERPL DL<=0.05 MIU/L-ACNC: 2.74 UIU/ML (ref 0.45–4.5)

## 2023-11-09 PROCEDURE — 80053 COMPREHEN METABOLIC PANEL: CPT

## 2023-11-09 PROCEDURE — 84443 ASSAY THYROID STIM HORMONE: CPT

## 2023-11-09 PROCEDURE — 36415 COLL VENOUS BLD VENIPUNCTURE: CPT

## 2023-11-09 PROCEDURE — 80061 LIPID PANEL: CPT

## 2023-11-10 ENCOUNTER — PATIENT OUTREACH (OUTPATIENT)
Dept: FAMILY MEDICINE CLINIC | Facility: CLINIC | Age: 73
End: 2023-11-10

## 2023-11-10 NOTE — PROGRESS NOTES
EMELY BAEZ received [facility] referral for transportation and financial concerns. EMELY BAEZ reviewed patient's chart. Patient's home phone listed as her fax number. EMELY BAEZ attempted to call mobile number regarding the above, however the number is not in service at this time. EMELY BAEZ reached out to patient's spouse's number and left a voicemail.

## 2023-11-13 ENCOUNTER — HOSPITAL ENCOUNTER (OUTPATIENT)
Dept: MRI IMAGING | Facility: HOSPITAL | Age: 73
Discharge: HOME | End: 2023-11-13
Payer: MEDICARE

## 2023-11-13 DIAGNOSIS — D32.9 BENIGN MENINGIOMA (HCC): ICD-10-CM

## 2023-11-13 PROCEDURE — A9585 GADOBUTROL INJECTION: HCPCS | Performed by: FAMILY MEDICINE

## 2023-11-13 PROCEDURE — 70553 MRI BRAIN STEM W/O & W/DYE: CPT

## 2023-11-13 PROCEDURE — G1004 CDSM NDSC: HCPCS

## 2023-11-13 RX ORDER — GADOBUTROL 604.72 MG/ML
7 INJECTION INTRAVENOUS
Status: COMPLETED | OUTPATIENT
Start: 2023-11-13 | End: 2023-11-13

## 2023-11-13 RX ADMIN — GADOBUTROL 7 ML: 604.72 INJECTION INTRAVENOUS at 11:41

## 2023-11-15 NOTE — ASSESSMENT & PLAN NOTE
Patient presents for annual follow up  S/p left frontoparietal craniotomy for mass resection on 10/15/2020 with Dr. Dee Dee Rae  Pathology consistent with Grade 1 Meningioma. Residual RLE weakness (AFO brace) and right sided numbness/tingling since surgery but improved    Imaging:  MRI brain w wo contrast 11/13/23: Stable postoperative changes with history of a left parietal meningioma resection. No evidence of recurrent tumor. No new mass, infarct or hemorrhage. Plan  Continue monitor neurological symptoms. MRI imaging reviewed in detail with patient and compared to additional postoperative scans as well as her preoperative scan. Expected findings without recurrent tumor. Recommend ongoing surveillance with repeat MRI brain with and without contrast in 1 year. We will plan outpatient follow-up as a joint appointment after completion. Pt needs pre treatment for contrasted imaging given contrast allergy:orders for Benadryl and Medrol provided. Pt made aware to contact the office with any questions or concerns in the interim.

## 2023-11-16 ENCOUNTER — TELEPHONE (OUTPATIENT)
Dept: NEUROSURGERY | Facility: CLINIC | Age: 73
End: 2023-11-16

## 2023-11-16 NOTE — TELEPHONE ENCOUNTER
Called LM. Pt was scheduled snpx HDM but he is not in office. Called and asked if she was ok seeing PA solo.  Asked for call back

## 2023-11-17 ENCOUNTER — OFFICE VISIT (OUTPATIENT)
Dept: NEUROSURGERY | Facility: CLINIC | Age: 73
End: 2023-11-17
Payer: MEDICARE

## 2023-11-17 VITALS
OXYGEN SATURATION: 99 % | BODY MASS INDEX: 26.66 KG/M2 | HEART RATE: 80 BPM | RESPIRATION RATE: 16 BRPM | TEMPERATURE: 98.5 F | HEIGHT: 65 IN | WEIGHT: 160 LBS | SYSTOLIC BLOOD PRESSURE: 122 MMHG | DIASTOLIC BLOOD PRESSURE: 78 MMHG

## 2023-11-17 DIAGNOSIS — D32.9 BENIGN MENINGIOMA (HCC): Primary | ICD-10-CM

## 2023-11-17 PROCEDURE — 99215 OFFICE O/P EST HI 40 MIN: CPT | Performed by: PHYSICIAN ASSISTANT

## 2023-11-17 RX ORDER — METHYLPREDNISOLONE 32 MG/1
TABLET ORAL
Qty: 2 TABLET | Refills: 0 | Status: SHIPPED | OUTPATIENT
Start: 2023-11-17

## 2023-11-17 NOTE — PROGRESS NOTES
Neurosurgery Office Note  Eliseo Morrison 68 y.o. female MRN: [id number]      Assessment/Plan     Benign meningioma [facility])  Patient presents for annual follow up  S/p left frontoparietal craniotomy for mass resection on 10/15/2020 with Dr. Jeimy Sosa  Pathology consistent with Grade 1 Meningioma. Residual RLE weakness (AFO brace) and right sided numbness/tingling since surgery but improved    Imaging:  MRI brain w wo contrast 11/13/23: Stable postoperative changes with history of a left parietal meningioma resection. No evidence of recurrent tumor. No new mass, infarct or hemorrhage. Plan  Continue monitor neurological symptoms. MRI imaging reviewed in detail with patient and compared to additional postoperative scans as well as her preoperative scan. Expected findings without recurrent tumor. Recommend ongoing surveillance with repeat MRI brain with and without contrast in 1 year. We will plan outpatient follow-up as a joint appointment after completion. Pt needs pre treatment for contrasted imaging given contrast allergy:orders for Benadryl and Medrol provided. Pt made aware to contact the office with any questions or concerns in the interim. Diagnoses and all orders for this visit:    Benign meningioma ([address])  -     Cancel: MRI brain with and without contrast; Future  -     BUN/Creatinine Ratio; Future  -     diphenhydrAMINE (BENADRYL) 50 MG tablet; Take 1 tablet (50 mg total) by mouth 60 minutes pre-procedure  -     methylPREDNISolone (MEDROL) 32 MG tablet; Take one tablet by mouth 12 hours before procedure and then take one tablet by mouth 2 hours before procedure.   -     MRI brain with and without contrast; Future          I have spent a total time of 45 minutes on 11/17/23 in caring for this patient including Diagnostic results, Instructions for management, Patient and family education, Impressions, Documenting in the medical record, Reviewing / ordering tests, medicine, procedures  , and Obtaining or reviewing history  . CHIEF COMPLAINT    Chief Complaint   Patient presents with   • Follow-up     Benign meningioma [facility])       HISTORY    History of Present Illness     68y.o. year old female     This is a 78-year-old female with past medical history significant for hypercholesterolemia, meningioma status post resection of left frontoparietal Grade 1 Meningioma resection on 10/15/2020 by Dr. Deborah Howard and seizure who presents today for annual follow-up. Pathology consistent with grade 1 meningioma and patient did not require adjuvant radiation therapy. She underwent updated MRI and presents today for review. Today, patient continues with right-sided tingling/numbness which is significantly improved from immediate postop. She continues with mild residual weakness most noted in right ankle for which she wears an AFO brace. Patient states without the brace her ankle continues to turn inward and her toes curl up. She is ambulatory with the use of a cane. At times she states her right leg feels significantly heavy as if she is lifting to legs. Patient is accompanied by her  to this visit. REVIEW OF SYSTEMS    Review of Systems   Constitutional: Negative. HENT:  Positive for tinnitus (not often). Negative for nosebleeds. Eyes:  Positive for visual disturbance (floaters). Respiratory: Negative. Cardiovascular: Negative. Gastrointestinal:  Positive for constipation. Negative for nausea and vomiting. Wont go for 2/3 days and then has bad constipation    Endocrine: Negative. Genitourinary: Negative. Musculoskeletal:  Positive for back pain (lower back a little bit), gait problem (She feels her walking has gotten better over the past year.), myalgias (sg horse middle of night) and neck pain (a little bit of neck pain from shoulder to neck into arm). Drop foot, Right foot turns in and toes curl in once in awhile. Still in PT twice a week. Skin: Negative. Allergic/Immunologic: Negative. Neurological:  Positive for weakness (Right sided but more in R leg) and numbness (tinglingling and heaviness whole right side of body). Negative for dizziness, tremors, seizures, speech difficulty and headaches. MRI BRAIN 23   Hematological: Negative. Neg AC   Psychiatric/Behavioral: Negative. All other systems reviewed and are negative. ROS obtained by MA. Reviewed. See HPI. Meds/Allergies     Current Outpatient Medications   Medication Sig Dispense Refill   • diphenhydrAMINE (BENADRYL) 50 MG tablet Take 1 tablet (50 mg total) by mouth 60 minutes pre-procedure 1 tablet 0   • levETIRAcetam (KEPPRA) 500 mg tablet Take 1 tablet (500 mg total) by mouth every 12 (twelve) hours 180 tablet 2   • methylPREDNISolone (MEDROL) 32 MG tablet Take one tablet by mouth 12 hours before procedure and then take one tablet by mouth 2 hours before procedure. 2 tablet 0   • diphenhydrAMINE (BENADRYL) 50 MG tablet Take 1 tablet 1 hour prior to MRI (Patient not taking: Reported on 2023) 1 tablet 0   • predniSONE 50 mg tablet Take 1 tablet 13 hours prior to MRI, Take 1 tablet 7 hours prior to MRI, Take 1 tablet 1 hour prior to MRI (Patient not taking: Reported on 2023) 3 tablet 0     No current facility-administered medications for this visit. Allergies   Allergen Reactions   • Gadolinium Derivatives Seizures   • Trilipix [Choline Fenofibrate] Other (See Comments)     Pain Started in the upper/mid back and radiated into the front (chest area). Pt unaware of this response and is unsure.        PAST HISTORY    Past Medical History:   Diagnosis Date   • Arthritis     L shoulder   • Brain compression ([address]) 2020   • Hypercholesteremia    • Pneumonia        Past Surgical History:   Procedure Laterality Date   •  SECTION     • [place] CRNEC TREPHINE BONE FLAP MENINGIOMA SUPRATENTOR Left 10/15/2020    Procedure: Image guided left frontoparietal craniotomy for tumor;  Surgeon: Teri Pastrana MD;  [facility];  Service: Neurosurgery   • TUBAL LIGATION         Social History     Tobacco Use   • Smoking status: Never   • Smokeless tobacco: Never   Vaping Use   • Vaping Use: Never used   Substance Use Topics   • Alcohol use: Yes     Comment: special occasions   • Drug use: Never       Family History   Problem Relation Age of Onset   • No Known Problems Mother    • No Known Problems Sister          Above history personally reviewed. EXAM    Vitals:Blood pressure 122/78, pulse 80, temperature 98.5 °F (36.9 °C), temperature source Tympanic, resp. rate 16, height 5' 5" (1.651 m), weight 72.6 kg (160 lb), SpO2 99 %, not currently breastfeeding. ,Body mass index is 26.63 kg/m². Physical Exam  Constitutional:       General: She is not in acute distress. Appearance: Normal appearance. She is well-developed. She is not ill-appearing. HENT:      Head: Normocephalic and atraumatic. Right Ear: External ear normal.      Left Ear: External ear normal.      Nose: Nose normal.      Mouth/Throat:      Mouth: Mucous membranes are moist.   Eyes:      General: No scleral icterus. Right eye: No discharge. Left eye: No discharge. Extraocular Movements: EOM normal.      Conjunctiva/sclera: Conjunctivae normal.   Cardiovascular:      Rate and Rhythm: Normal rate. Pulmonary:      Effort: Pulmonary effort is normal. No respiratory distress. Abdominal:      General: There is no distension. Palpations: Abdomen is soft. Skin:     General: Skin is warm and dry. Findings: No rash. Neurological:      Mental Status: She is alert. Psychiatric:         Mood and Affect: Mood normal.         Speech: Speech normal.         Behavior: Behavior normal.         Thought Content: Thought content normal.         Judgment: Judgment normal.       Neurologic Exam     Mental Status   Follows 3 step commands.    Attention: normal. Concentration: normal.   Speech: speech is normal   Level of consciousness: alert  Knowledge: good. Normal comprehension. Cranial Nerves     CN III, IV, VI   Extraocular motions are normal.   Upgaze: normal  Conjugate gaze: present    CN V   Facial sensation intact. CN VII   Facial expression full, symmetric. CN VIII   Hearing: intact    CN XI   Right trapezius strength: normal  Left trapezius strength: normal    CN XII   Tongue: not atrophic  Fasciculations: absent  Tongue deviation: none    Motor Exam     Strength   Strength 5/5 except as noted. RLE 4-4+ HF/KF/KE, AFO brace on ankle     Sensory Exam   Left arm light touch: normal  Left leg light touch: normal  Slight tingling to LT RUE (Prox>dist) and RLE (distal>prox)     Gait, Coordination, and Reflexes     Tremor   Resting tremor: absent  Intention tremor: absent  Action tremor: absent    Reflexes   Right Miguel: absent  Left Miguel: absent        MEDICAL DECISION MAKING    Imaging Studies:     MRI brain w wo contrast    Result Date: 11/15/2023  Narrative: MRI BRAIN WITH AND WITHOUT CONTRAST INDICATION: D32.9: Benign neoplasm of meninges, unspecified. COMPARISON: MRI brain 11/9/2022, 9/2/2020; 10/15/2020 TECHNIQUE: Multiplanar, multisequence imaging of the brain was performed before and after gadolinium administration. Imaging performed on 1.5T MRI IV Contrast:  7 mL of Gadobutrol injection (SINGLE-DOSE) IMAGE QUALITY:   Diagnostic. FINDINGS: BRAIN PARENCHYMA: Stable encephalomalacia with surrounding gliosis in the left frontoparietal region status post resection of meningioma. No abnormal enhancement to suggest recurrence of disease. Hemosiderin staining in the resection cavity subjacent to left parietal craniotomy is stable. There is no discrete mass, mass effect or midline shift. There is no intracranial hemorrhage. Normal posterior fossa. Diffusion imaging is unremarkable.  Stable small scattered hyperintensities on T2/FLAIR imaging are noted in the periventricular and subcortical white matter demonstrating an appearance that is statistically most likely to represent mild microangiopathic change. VENTRICLES: Slight ex vacuo dilatation of the body and occipital horn left lateral ventricle is stable. SELLA AND PITUITARY GLAND:  Normal. ORBITS:  Normal. PARANASAL SINUSES:  Normal. VASCULATURE:  Evaluation of the major intracranial vasculature demonstrates appropriate flow voids. CALVARIUM AND SKULL BASE: Marrow signal heterogeneity within the calvarium could be suggestive of anemia. EXTRACRANIAL SOFT TISSUES:  Normal.     Impression: 1. Stable postop changes, status post resection of left parietal meningioma. No evidence of recurrent tumor. 2. Mild, chronic microangiopathy elsewhere is stable. 3. No acute infarction, intracranial hemorrhage or new mass lesion. Resident: Darlyn Zazueta, the attending radiologist, have reviewed the images and agree with the final report above. Workstation performed: [id number]       I have personally reviewed pertinent reports.    and I have personally reviewed pertinent films in PACS

## 2023-11-22 ENCOUNTER — PATIENT OUTREACH (OUTPATIENT)
Dept: FAMILY MEDICINE CLINIC | Facility: CLINIC | Age: 73
End: 2023-11-22

## 2023-11-22 NOTE — PROGRESS NOTES
OPCM SW attempted second outreach to spouse's number. OPCMEGAN SW left a voicemail and mailed UTR letter to address on file.

## 2023-11-22 NOTE — LETTER
[address] [place] [address] Drive [id number] [phone]    Re: unable to reach   11/22/2023       Dear Carole Mon am a 1958 [facility]  and wanted to be certain you had information to contact me should you desire assistance with or have questions about non-medical aspects of your care such as [but not limited to] medical insurance, housing, transportation, material needs, or emergency needs. If I do not have an answer I will assist you in finding the appropriate agency or individual who can help. Please feel free to contact me at [phone]. Thank You.     Sincerely,         OZZY Desir

## 2024-04-26 ENCOUNTER — TELEPHONE (OUTPATIENT)
Dept: NEUROLOGY | Facility: CLINIC | Age: 74
End: 2024-04-26

## 2024-04-26 NOTE — TELEPHONE ENCOUNTER
Called to confirm 05/01 appt. Pt stated that she only see's Dr. Giang once a year. Pt wants to cancel 05/01 appt and schedule it sometime in October. Offered pt 10/30/24 and she happily accepted.

## 2024-05-13 ENCOUNTER — TELEPHONE (OUTPATIENT)
Dept: NEUROLOGY | Facility: CLINIC | Age: 74
End: 2024-05-13

## 2024-08-21 ENCOUNTER — TELEPHONE (OUTPATIENT)
Age: 74
End: 2024-08-21

## 2024-08-21 NOTE — TELEPHONE ENCOUNTER
Pt called in regards to appt canceled  on 10/30/24 with Dr. Giang.   Pt was told she will be seeing DR. Watkins on 10/30/24. Pt has no appt with Dr. Watkins,    I explained to pt that DR. Watkins will be coming to the office by the end of August. Pt will call sometime next week to schedule an appt with DR. Watkins.

## 2024-08-21 NOTE — TELEPHONE ENCOUNTER
Pt scheduled her awv for 11/11/24 with Higinio Escamilla and she's asking if her want to order blood work that she can have done prior to that appointment.  She would like a call back once the labs are ordered.  Also, she said Dr. Watkins is taking over Dr. Ferny Giang's Neurology office and she wants to know if Higinio Escamilla heard anything about this or about Dr. Watkins's history as a neurologist.

## 2024-08-22 DIAGNOSIS — G40.109 SIMPLE PARTIAL SEIZURES (HCC): ICD-10-CM

## 2024-08-22 DIAGNOSIS — D32.9 BENIGN MENINGIOMA (HCC): Primary | ICD-10-CM

## 2024-08-22 DIAGNOSIS — R79.89 ELEVATED TSH: ICD-10-CM

## 2024-08-22 DIAGNOSIS — R53.83 OTHER FATIGUE: ICD-10-CM

## 2024-08-22 DIAGNOSIS — Z13.1 SCREENING FOR DIABETES MELLITUS: ICD-10-CM

## 2024-08-22 DIAGNOSIS — Z13.220 ENCOUNTER FOR SCREENING FOR LIPID DISORDER: ICD-10-CM

## 2024-08-29 ENCOUNTER — TELEPHONE (OUTPATIENT)
Dept: NEUROLOGY | Facility: CLINIC | Age: 74
End: 2024-08-29

## 2024-10-08 ENCOUNTER — TELEPHONE (OUTPATIENT)
Dept: NEUROLOGY | Facility: CLINIC | Age: 74
End: 2024-10-08

## 2024-10-08 NOTE — TELEPHONE ENCOUNTER
Soke to patient to change appointment with Dr. Watkins for 11/19/24 at 8:30 AM from 10/30/24 at 9 AM. Requested to be put on waiting list for a sooner appointment.

## 2024-11-04 ENCOUNTER — RA CDI HCC (OUTPATIENT)
Dept: OTHER | Facility: HOSPITAL | Age: 74
End: 2024-11-04

## 2024-11-07 ENCOUNTER — APPOINTMENT (OUTPATIENT)
Dept: LAB | Facility: HOSPITAL | Age: 74
End: 2024-11-07
Payer: MEDICARE

## 2024-11-07 DIAGNOSIS — Z13.220 ENCOUNTER FOR SCREENING FOR LIPID DISORDER: ICD-10-CM

## 2024-11-07 DIAGNOSIS — G40.109 SIMPLE PARTIAL SEIZURES (HCC): ICD-10-CM

## 2024-11-07 DIAGNOSIS — Z13.1 SCREENING FOR DIABETES MELLITUS: ICD-10-CM

## 2024-11-07 DIAGNOSIS — D32.9 BENIGN MENINGIOMA (HCC): ICD-10-CM

## 2024-11-07 DIAGNOSIS — R53.83 OTHER FATIGUE: ICD-10-CM

## 2024-11-07 DIAGNOSIS — R79.89 ELEVATED TSH: ICD-10-CM

## 2024-11-07 LAB
ALBUMIN SERPL BCG-MCNC: 4.1 G/DL (ref 3.5–5)
ALP SERPL-CCNC: 68 U/L (ref 34–104)
ALT SERPL W P-5'-P-CCNC: 30 U/L (ref 7–52)
ANION GAP SERPL CALCULATED.3IONS-SCNC: 7 MMOL/L (ref 4–13)
AST SERPL W P-5'-P-CCNC: 26 U/L (ref 13–39)
BILIRUB SERPL-MCNC: 0.56 MG/DL (ref 0.2–1)
BUN SERPL-MCNC: 15 MG/DL (ref 5–25)
CALCIUM SERPL-MCNC: 9.5 MG/DL (ref 8.4–10.2)
CHLORIDE SERPL-SCNC: 109 MMOL/L (ref 96–108)
CHOLEST SERPL-MCNC: 233 MG/DL
CO2 SERPL-SCNC: 27 MMOL/L (ref 21–32)
CREAT SERPL-MCNC: 0.76 MG/DL (ref 0.6–1.3)
ERYTHROCYTE [DISTWIDTH] IN BLOOD BY AUTOMATED COUNT: 12.4 % (ref 11.6–15.1)
GFR SERPL CREATININE-BSD FRML MDRD: 78 ML/MIN/1.73SQ M
GLUCOSE SERPL-MCNC: 157 MG/DL (ref 65–140)
HCT VFR BLD AUTO: 41.7 % (ref 34.8–46.1)
HDLC SERPL-MCNC: 50 MG/DL
HGB BLD-MCNC: 13.6 G/DL (ref 11.5–15.4)
LDLC SERPL CALC-MCNC: 132 MG/DL (ref 0–100)
LEVETIRACETAM SERPL-MCNC: <2 UG/ML (ref 12–46)
MCH RBC QN AUTO: 29.6 PG (ref 26.8–34.3)
MCHC RBC AUTO-ENTMCNC: 32.6 G/DL (ref 31.4–37.4)
MCV RBC AUTO: 91 FL (ref 82–98)
PLATELET # BLD AUTO: 242 THOUSANDS/UL (ref 149–390)
PMV BLD AUTO: 13.3 FL (ref 8.9–12.7)
POTASSIUM SERPL-SCNC: 3.8 MMOL/L (ref 3.5–5.3)
PROT SERPL-MCNC: 7.1 G/DL (ref 6.4–8.4)
RBC # BLD AUTO: 4.6 MILLION/UL (ref 3.81–5.12)
SODIUM SERPL-SCNC: 143 MMOL/L (ref 135–147)
TRIGL SERPL-MCNC: 256 MG/DL
TSH SERPL DL<=0.05 MIU/L-ACNC: 1.68 UIU/ML (ref 0.45–4.5)
WBC # BLD AUTO: 6.94 THOUSAND/UL (ref 4.31–10.16)

## 2024-11-07 PROCEDURE — 83520 IMMUNOASSAY QUANT NOS NONAB: CPT

## 2024-11-07 PROCEDURE — 84443 ASSAY THYROID STIM HORMONE: CPT

## 2024-11-07 PROCEDURE — 80053 COMPREHEN METABOLIC PANEL: CPT

## 2024-11-07 PROCEDURE — 36415 COLL VENOUS BLD VENIPUNCTURE: CPT

## 2024-11-07 PROCEDURE — 85027 COMPLETE CBC AUTOMATED: CPT

## 2024-11-07 PROCEDURE — 80061 LIPID PANEL: CPT

## 2024-11-08 ENCOUNTER — TELEPHONE (OUTPATIENT)
Age: 74
End: 2024-11-08

## 2024-11-08 DIAGNOSIS — Z91.041 CONTRAST MEDIA ALLERGY: ICD-10-CM

## 2024-11-08 NOTE — TELEPHONE ENCOUNTER
Phone call back from patient stating she called [facility] to see if her rx for prednisone was ready and she states it was not sent in yet. Patient wants to be able to pick this script up today and asking if one of the other providers can send this rx in.  Please advise. Thank you.

## 2024-11-08 NOTE — TELEPHONE ENCOUNTER
Patient called today, she is asking if could have her predniSONE 50 mg tablet script sent to [facility] pharmacy.     She is having her MRI 11/12/24 and would like to have her medication before the scan.     She is asking if this could be sent out today.

## 2024-11-11 ENCOUNTER — OFFICE VISIT (OUTPATIENT)
Dept: FAMILY MEDICINE CLINIC | Facility: CLINIC | Age: 74
End: 2024-11-11
Payer: MEDICARE

## 2024-11-11 VITALS
WEIGHT: 162 LBS | HEART RATE: 83 BPM | BODY MASS INDEX: 26.99 KG/M2 | DIASTOLIC BLOOD PRESSURE: 76 MMHG | TEMPERATURE: 98 F | HEIGHT: 65 IN | OXYGEN SATURATION: 98 % | SYSTOLIC BLOOD PRESSURE: 122 MMHG

## 2024-11-11 DIAGNOSIS — G40.909 NONINTRACTABLE EPILEPSY WITHOUT STATUS EPILEPTICUS, UNSPECIFIED EPILEPSY TYPE (HCC): ICD-10-CM

## 2024-11-11 DIAGNOSIS — Z12.31 ENCOUNTER FOR SCREENING MAMMOGRAM FOR BREAST CANCER: Primary | ICD-10-CM

## 2024-11-11 DIAGNOSIS — Z00.00 MEDICARE ANNUAL WELLNESS VISIT, SUBSEQUENT: ICD-10-CM

## 2024-11-11 DIAGNOSIS — Z13.820 ENCOUNTER FOR SCREENING FOR OSTEOPOROSIS: ICD-10-CM

## 2024-11-11 DIAGNOSIS — D32.9 BENIGN MENINGIOMA (HCC): ICD-10-CM

## 2024-11-11 PROBLEM — I69.851 HEMIPLEGIA AND HEMIPARESIS FOLLOWING OTHER CEREBROVASCULAR DISEASE AFFECTING RIGHT DOMINANT SIDE (HCC): Status: RESOLVED | Noted: 2022-05-12 | Resolved: 2024-11-11

## 2024-11-11 PROCEDURE — G0439 PPPS, SUBSEQ VISIT: HCPCS | Performed by: FAMILY MEDICINE

## 2024-11-11 RX ORDER — PREDNISONE 50 MG/1
TABLET ORAL
Qty: 3 TABLET | Refills: 0 | Status: SHIPPED | OUTPATIENT
Start: 2024-11-11 | End: 2024-11-19

## 2024-11-11 NOTE — PATIENT INSTRUCTIONS
Medicare Preventive Visit Patient Instructions  Thank you for completing your Welcome to Medicare Visit or Medicare Annual Wellness Visit today. Your next wellness visit will be due in one year (11/12/2025).  The screening/preventive services that you may require over the next 5-10 years are detailed below. Some tests may not apply to you based off risk factors and/or age. Screening tests ordered at today's visit but not completed yet may show as past due. Also, please note that scanned in results may not display below.  Preventive Screenings:  Service Recommendations Previous Testing/Comments   Colorectal Cancer Screening  * Colonoscopy    * Fecal Occult Blood Test (FOBT)/Fecal Immunochemical Test (FIT)  * Fecal DNA/Cologuard Test  * Flexible Sigmoidoscopy Age: 45-75 years old   Colonoscopy: every 10 years (may be performed more frequently if at higher risk)  OR  FOBT/FIT: every 1 year  OR  Cologuard: every 3 years  OR  Sigmoidoscopy: every 5 years  Screening may be recommended earlier than age 45 if at higher risk for colorectal cancer. Also, an individualized decision between you and your healthcare provider will decide whether screening between the ages of 76-85 would be appropriate. Colonoscopy: Not on file  FOBT/FIT: Not on file  Cologuard: Not on file  Sigmoidoscopy: Not on file          Breast Cancer Screening Age: 40+ years old  Frequency: every 1-2 years  Not required if history of left and right mastectomy Mammogram: Not on file        Cervical Cancer Screening Between the ages of 21-29, pap smear recommended once every 3 years.   Between the ages of 30-65, can perform pap smear with HPV co-testing every 5 years.   Recommendations may differ for women with a history of total hysterectomy, cervical cancer, or abnormal pap smears in past. Pap Smear: Not on file    Screening Not Indicated   Hepatitis C Screening Once for adults born between 1945 and 1965  More frequently in patients at high risk for  Hepatitis C Hep C Antibody: 07/14/2020    Screening Current   Diabetes Screening 1-2 times per year if you're at risk for diabetes or have pre-diabetes Fasting glucose: 106 mg/dL (11/9/2023)  A1C: 5.8 % (9/29/2020)  Screening Current   Cholesterol Screening Once every 5 years if you don't have a lipid disorder. May order more often based on risk factors. Lipid panel: 11/07/2024    Screening Current     Other Preventive Screenings Covered by Medicare:  Abdominal Aortic Aneurysm (AAA) Screening: covered once if your at risk. You're considered to be at risk if you have a family history of AAA.  Lung Cancer Screening: covers low dose CT scan once per year if you meet all of the following conditions: (1) Age 55-77; (2) No signs or symptoms of lung cancer; (3) Current smoker or have quit smoking within the last 15 years; (4) You have a tobacco smoking history of at least 20 pack years (packs per day multiplied by number of years you smoked); (5) You get a written order from a healthcare provider.  Glaucoma Screening: covered annually if you're considered high risk: (1) You have diabetes OR (2) Family history of glaucoma OR (3)  aged 50 and older OR (4)  American aged 65 and older  Osteoporosis Screening: covered every 2 years if you meet one of the following conditions: (1) You're estrogen deficient and at risk for osteoporosis based off medical history and other findings; (2) Have a vertebral abnormality; (3) On glucocorticoid therapy for more than 3 months; (4) Have primary hyperparathyroidism; (5) On osteoporosis medications and need to assess response to drug therapy.   Last bone density test (DXA Scan): Not on file.  HIV Screening: covered annually if you're between the age of 15-65. Also covered annually if you are younger than 15 and older than 65 with risk factors for HIV infection. For pregnant patients, it is covered up to 3 times per pregnancy.    Immunizations:  Immunization  Recommendations   Influenza Vaccine Annual influenza vaccination during flu season is recommended for all persons aged >= 6 months who do not have contraindications   Pneumococcal Vaccine   * Pneumococcal conjugate vaccine = PCV13 (Prevnar 13), PCV15 (Vaxneuvance), PCV20 (Prevnar 20)  * Pneumococcal polysaccharide vaccine = PPSV23 (Pneumovax) Adults 19-63 yo with certain risk factors or if 65+ yo  If never received any pneumonia vaccine: recommend Prevnar 20 (PCV20)  Give PCV20 if previously received 1 dose of PCV13 or PPSV23   Hepatitis B Vaccine 3 dose series if at intermediate or high risk (ex: diabetes, end stage renal disease, liver disease)   Respiratory syncytial virus (RSV) Vaccine - COVERED BY MEDICARE PART D  * RSVPreF3 (Arexvy) CDC recommends that adults 60 years of age and older may receive a single dose of RSV vaccine using shared clinical decision-making (SCDM)   Tetanus (Td) Vaccine - COST NOT COVERED BY MEDICARE PART B Following completion of primary series, a booster dose should be given every 10 years to maintain immunity against tetanus. Td may also be given as tetanus wound prophylaxis.   Tdap Vaccine - COST NOT COVERED BY MEDICARE PART B Recommended at least once for all adults. For pregnant patients, recommended with each pregnancy.   Shingles Vaccine (Shingrix) - COST NOT COVERED BY MEDICARE PART B  2 shot series recommended in those 19 years and older who have or will have weakened immune systems or those 50 years and older     Health Maintenance Due:      Topic Date Due   • Breast Cancer Screening: Mammogram  Never done   • Colorectal Cancer Screening  Never done   • Hepatitis C Screening  Completed     Immunizations Due:      Topic Date Due   • Pneumococcal Vaccine: 65+ Years (1 of 1 - PCV) Never done   • Influenza Vaccine (1) Never done   • COVID-19 Vaccine (1 - 2023-24 season) Never done     Advance Directives   What are advance directives?  Advance directives are legal documents that  state your wishes and plans for medical care. These plans are made ahead of time in case you lose your ability to make decisions for yourself. Advance directives can apply to any medical decision, such as the treatments you want, and if you want to donate organs.   What are the types of advance directives?  There are many types of advance directives, and each state has rules about how to use them. You may choose a combination of any of the following:  Living will:  This is a written record of the treatment you want. You can also choose which treatments you do not want, which to limit, and which to stop at a certain time. This includes surgery, medicine, IV fluid, and tube feedings.   Durable power of  for healthcare (DPAHC):  This is a written record that states who you want to make healthcare choices for you when you are unable to make them for yourself. This person, called a proxy, is usually a family member or a friend. You may choose more than 1 proxy.  Do not resuscitate (DNR) order:  A DNR order is used in case your heart stops beating or you stop breathing. It is a request not to have certain forms of treatment, such as CPR. A DNR order may be included in other types of advance directives.  Medical directive:  This covers the care that you want if you are in a coma, near death, or unable to make decisions for yourself. You can list the treatments you want for each condition. Treatment may include pain medicine, surgery, blood transfusions, dialysis, IV or tube feedings, and a ventilator (breathing machine).  Values history:  This document has questions about your views, beliefs, and how you feel and think about life. This information can help others choose the care that you would choose.  Why are advance directives important?  An advance directive helps you control your care. Although spoken wishes may be used, it is better to have your wishes written down. Spoken wishes can be misunderstood, or not  followed. Treatments may be given even if you do not want them. An advance directive may make it easier for your family to make difficult choices about your care.   Fall Prevention    Fall prevention  includes ways to make your home and other areas safer. It also includes ways you can move more carefully to prevent a fall. Health conditions that cause changes in your blood pressure, vision, or muscle strength and coordination may increase your risk for falls. Medicines may also increase your risk for falls if they make you dizzy, weak, or sleepy.   Fall prevention tips:   Stand or sit up slowly.    Use assistive devices as directed.    Wear shoes that fit well and have soles that .    Wear a personal alarm.    Stay active.    Manage your medical conditions.    Home Safety Tips:  Add items to prevent falls in the bathroom.    Keep paths clear.    Install bright lights in your home.    Keep items you use often on shelves within reach.    Paint or place reflective tape on the edges of your stairs.    Weight Management   Why it is important to manage your weight:  Being overweight increases your risk of health conditions such as heart disease, high blood pressure, type 2 diabetes, and certain types of cancer. It can also increase your risk for osteoarthritis, sleep apnea, and other respiratory problems. Aim for a slow, steady weight loss. Even a small amount of weight loss can lower your risk of health problems.  How to lose weight safely:  A safe and healthy way to lose weight is to eat fewer calories and get regular exercise. You can lose up about 1 pound a week by decreasing the number of calories you eat by 500 calories each day.   Healthy meal plan for weight management:  A healthy meal plan includes a variety of foods, contains fewer calories, and helps you stay healthy. A healthy meal plan includes the following:  Eat whole-grain foods more often.  A healthy meal plan should contain fiber. Fiber is the part  of grains, fruits, and vegetables that is not broken down by your body. Whole-grain foods are healthy and provide extra fiber in your diet. Some examples of whole-grain foods are whole-wheat breads and pastas, oatmeal, brown rice, and bulgur.  Eat a variety of vegetables every day.  Include dark, leafy greens such as spinach, kale, noemi greens, and mustard greens. Eat yellow and orange vegetables such as carrots, sweet potatoes, and winter squash.   Eat a variety of fruits every day.  Choose fresh or canned fruit (canned in its own juice or light syrup) instead of juice. Fruit juice has very little or no fiber.  Eat low-fat dairy foods.  Drink fat-free (skim) milk or 1% milk. Eat fat-free yogurt and low-fat cottage cheese. Try low-fat cheeses such as mozzarella and other reduced-fat cheeses.  Choose meat and other protein foods that are low in fat.  Choose beans or other legumes such as split peas or lentils. Choose fish, skinless poultry (chicken or turkey), or lean cuts of red meat (beef or pork). Before you cook meat or poultry, cut off any visible fat.   Use less fat and oil.  Try baking foods instead of frying them. Add less fat, such as margarine, sour cream, regular salad dressing and mayonnaise to foods. Eat fewer high-fat foods. Some examples of high-fat foods include french fries, doughnuts, ice cream, and cakes.  Eat fewer sweets.  Limit foods and drinks that are high in sugar. This includes candy, cookies, regular soda, and sweetened drinks.  Exercise:  Exercise at least 30 minutes per day on most days of the week. Some examples of exercise include walking, biking, dancing, and swimming. You can also fit in more physical activity by taking the stairs instead of the elevator or parking farther away from stores. Ask your healthcare provider about the best exercise plan for you.      © Copyright [employer] 2018 Information is for End User's use only and may not be sold, redistributed or otherwise  used for commercial purposes. All illustrations and images included in CareNotes® are the copyrighted property of [employer].A.M., Inc. or [employer]

## 2024-11-11 NOTE — PROGRESS NOTES
Ambulatory Visit  Name: Izabela Moeller      : 1950      MRN: [id number]  Encounter Provider: BOBBY Lyn  Encounter Date: 2024   Encounter department: [facility] [address]    Assessment & Plan  Encounter for screening mammogram for breast cancer    Orders:    Mammo screening bilateral w 3d and cad; Future    Medicare annual wellness visit, subsequent         Encounter for screening for osteoporosis         Nonintractable epilepsy without status epilepticus, unspecified epilepsy type (HCC)  Seizure-free, admits to not taking medications previously prescribed by Neurology will have discussion with neurology upcoming appointment,       Benign meningioma (HCC)           Depression Screening and Follow-up Plan: Patient was screened for depression during today's encounter. They screened negative with a PHQ-2 score of 2.    Falls Plan of Care: Recommended assistive device to help with gait and balance.       Preventive health issues were discussed with patient, and age appropriate screening tests were ordered as noted in patient's After Visit Summary. Personalized health advice and appropriate referrals for health education or preventive services given if needed, as noted in patient's After Visit Summary.    History of Present Illness     F/u   Scheduled f/u with neurology in regard   Scheduled for repeat mri   Has not received pretreatment prednisone  History of seizure disorder has not been taking Keppra felt unnecessary denies any recent seizure activity Refusing influenza vac   Refusing dexa screen   Refusing mammo       Patient Care Team:  BOBBY Lyn as PCP - General (Family Medicine)  Nick Diaz MD (Neurosurgery)  Chapo Abarca MD (Radiation Oncology)    Review of Systems   Constitutional:  Negative for appetite change, chills, fever and unexpected weight change.   HENT:  Negative for congestion, dental problem, ear pain, hearing  loss, postnasal drip, rhinorrhea, sinus pressure, sinus pain, sneezing, sore throat, tinnitus and voice change.    Eyes:  Negative for visual disturbance.   Respiratory:  Negative for apnea, cough, chest tightness and shortness of breath.    Cardiovascular:  Negative for chest pain, palpitations and leg swelling.   Gastrointestinal:  Negative for abdominal pain, blood in stool, constipation, diarrhea, nausea and vomiting.   Endocrine: Negative for cold intolerance, heat intolerance, polydipsia, polyphagia and polyuria.   Genitourinary:  Negative for decreased urine volume, difficulty urinating, dysuria, frequency and hematuria.   Musculoskeletal:  Negative for arthralgias, back pain, gait problem, joint swelling and myalgias.   Skin:  Negative for color change, rash and wound.   Allergic/Immunologic: Negative for environmental allergies and food allergies.   Neurological:  Negative for dizziness, syncope, weakness, light-headedness, numbness and headaches.   Hematological:  Negative for adenopathy. Does not bruise/bleed easily.   Psychiatric/Behavioral:  Negative for sleep disturbance and suicidal ideas. The patient is not nervous/anxious.      Medical History Reviewed by provider this encounter:  Tobacco  Allergies  Meds  Problems  Med Hx  Surg Hx  Fam Hx       Annual Wellness Visit Questionnaire   Izabela is here for her Subsequent Wellness visit.     Health Risk Assessment:   Patient rates overall health as good. Patient feels that their physical health rating is same. Patient is satisfied with their life. Eyesight was rated as same. Hearing was rated as same. Patient feels that their emotional and mental health rating is same. Patients states they are sometimes angry. Patient states they are sometimes unusually tired/fatigued. Pain experienced in the last 7 days has been some. Patient's pain rating has been 5/10. Patient states that she has experienced no weight loss or gain in last 6 months.      Depression Screening:   PHQ-2 Score: 2      Fall Risk Screening:   In the past year, patient has experienced: history of falling in past year    Number of falls: 2 or more  Injured during fall?: No    Feels unsteady when standing or walking?: No    Worried about falling?: No      Urinary Incontinence Screening:   Patient has not leaked urine accidently in the last six months.     Home Safety:  Patient does not have trouble with stairs inside or outside of their home. Patient has working smoke alarms and has working carbon monoxide detector. Home safety hazards include: none.     Nutrition:   Current diet is Regular.     Medications:   Patient is not currently taking any over-the-counter supplements. Patient is able to manage medications.     Activities of Daily Living (ADLs)/Instrumental Activities of Daily Living (IADLs):   Walk and transfer into and out of bed and chair?: Yes  Dress and groom yourself?: Yes    Bathe or shower yourself?: Yes    Feed yourself? Yes  Do your laundry/housekeeping?: Yes  Manage your money, pay your bills and track your expenses?: Yes  Make your own meals?: Yes    Do your own shopping?: Yes    Previous Hospitalizations:   Any hospitalizations or ED visits within the last 12 months?: No      Advance Care Planning:   Living will: No      PREVENTIVE SCREENINGS      Cardiovascular Screening:    General: Screening Current      Diabetes Screening:     General: Screening Current      Cervical Cancer Screening:    General: Screening Not Indicated      Lung Cancer Screening:     General: Screening Not Indicated      Hepatitis C Screening:    General: Screening Current    Screening, Brief Intervention, and Referral to Treatment (SBIRT)    Screening  Typical number of drinks in a day: 0  Typical number of drinks in a week: 0  Interpretation: Low risk drinking behavior.    Social Determinants of Health     Financial Resource Strain: High Risk (11/7/2023)    Overall Financial Resource Strain  "(CARDIA)     Difficulty of Paying Living Expenses: Very hard   Food Insecurity: No Food Insecurity (11/11/2024)    Hunger Vital Sign     Worried About Running Out of Food in the Last Year: Never true     Ran Out of Food in the Last Year: Never true   Transportation Needs: No Transportation Needs (11/11/2024)    PRAPARE - Transportation     Lack of Transportation (Medical): No     Lack of Transportation (Non-Medical): No   Housing Stability: Low Risk  (11/11/2024)    Housing Stability Vital Sign     Unable to Pay for Housing in the Last Year: No     Number of Times Moved in the Last Year: 0     Homeless in the Last Year: No   Utilities: Not At Risk (11/11/2024)    [facility] Utilities     Threatened with loss of utilities: No     No results found.    Objective     /76 (BP Location: Left arm, Patient Position: Sitting)   Pulse 83   Temp 98 °F (36.7 °C)   Ht 5' 5\" (1.651 m)   Wt 73.5 kg (162 lb)   SpO2 98%   BMI 26.96 kg/m²     Physical Exam  Constitutional:       General: She is not in acute distress.     Appearance: She is well-developed. She is not ill-appearing or toxic-appearing.   HENT:      Head: Normocephalic and atraumatic.   Neck:      Vascular: No carotid bruit.   Cardiovascular:      Rate and Rhythm: Normal rate and regular rhythm.      Heart sounds: Normal heart sounds. No murmur heard.  Pulmonary:      Effort: Pulmonary effort is normal.      Breath sounds: Normal breath sounds.   Abdominal:      Palpations: Abdomen is soft.   Musculoskeletal:         General: Normal range of motion.      Cervical back: Normal range of motion and neck supple.   Lymphadenopathy:      Cervical: No cervical adenopathy.   Skin:     General: Skin is warm and dry.      Findings: No rash.   Neurological:      Mental Status: She is alert and oriented to person, place, and time. Mental status is at baseline.      Gait: Gait abnormal.      Deep Tendon Reflexes: Reflexes are normal and symmetric.      Comments: Right ankle " braced   Psychiatric:         Behavior: Behavior normal.         Thought Content: Thought content normal.         Judgment: Judgment normal.

## 2024-11-11 NOTE — ASSESSMENT & PLAN NOTE
Seizure-free, admits to not taking medications previously prescribed by Neurology will have discussion with neurology upcoming appointment,

## 2024-11-12 ENCOUNTER — HOSPITAL ENCOUNTER (OUTPATIENT)
Dept: MRI IMAGING | Facility: HOSPITAL | Age: 74
Discharge: HOME | End: 2024-11-12
Payer: MEDICARE

## 2024-11-12 DIAGNOSIS — D32.9 BENIGN MENINGIOMA (HCC): ICD-10-CM

## 2024-11-12 PROCEDURE — A9585 GADOBUTROL INJECTION: HCPCS | Performed by: PHYSICIAN ASSISTANT

## 2024-11-12 PROCEDURE — 70553 MRI BRAIN STEM W/O & W/DYE: CPT

## 2024-11-12 PROCEDURE — G1004 CDSM NDSC: HCPCS

## 2024-11-12 RX ORDER — GADOBUTROL 604.72 MG/ML
7 INJECTION INTRAVENOUS
Status: COMPLETED | OUTPATIENT
Start: 2024-11-12 | End: 2024-11-12

## 2024-11-12 RX ADMIN — GADOBUTROL 7 ML: 604.72 INJECTION INTRAVENOUS at 11:58

## 2024-11-14 ENCOUNTER — RESULTS FOLLOW-UP (OUTPATIENT)
Age: 74
End: 2024-11-14

## 2024-11-19 ENCOUNTER — OFFICE VISIT (OUTPATIENT)
Dept: NEUROLOGY | Facility: CLINIC | Age: 74
End: 2024-11-19
Payer: MEDICARE

## 2024-11-19 VITALS
SYSTOLIC BLOOD PRESSURE: 112 MMHG | OXYGEN SATURATION: 99 % | BODY MASS INDEX: 26.99 KG/M2 | DIASTOLIC BLOOD PRESSURE: 60 MMHG | HEART RATE: 80 BPM | HEIGHT: 65 IN | WEIGHT: 162 LBS

## 2024-11-19 DIAGNOSIS — G81.91 RIGHT HEMIPARESIS (HCC): Primary | ICD-10-CM

## 2024-11-19 DIAGNOSIS — G40.109 PARTIAL SYMPTOMATIC EPILEPSY WITH SIMPLE PARTIAL SEIZURES, NOT INTRACTABLE, WITHOUT STATUS EPILEPTICUS (HCC): ICD-10-CM

## 2024-11-19 PROCEDURE — 99215 OFFICE O/P EST HI 40 MIN: CPT | Performed by: STUDENT IN AN ORGANIZED HEALTH CARE EDUCATION/TRAINING PROGRAM

## 2024-11-19 RX ORDER — BACLOFEN 5 MG/1
5 TABLET ORAL 2 TIMES DAILY
Qty: 60 TABLET | Refills: 5 | Status: SHIPPED | OUTPATIENT
Start: 2024-11-19 | End: 2024-12-19

## 2024-11-19 RX ORDER — BACLOFEN 5 MG/1
5 TABLET ORAL 2 TIMES DAILY
Qty: 60 TABLET | Refills: 0 | Status: SHIPPED | OUTPATIENT
Start: 2024-11-19 | End: 2024-11-19

## 2024-11-19 NOTE — ASSESSMENT & PLAN NOTE
Does not want to take antiepileptics as her seizures do not bother her.  - monitor clinically  - continue seizure precautions

## 2024-11-19 NOTE — PATIENT INSTRUCTIONS
It was a pleasure to see you today.     I am starting a medication called baclofen to help with the tightness in your right leg. You can take 5mg twice a day and see if it helps with the throbbing pain. I think that doing botox injections with physiatry would be the ultimate goal to help the positioning of your feet.     If you need to schedule any appointments, tests or imaging studies, the call center number is [phone].     If you need anything, please contact us at [phone], or contact me via [employer].     The nearest lab is at [facility] at [address] [place] [address].   The nearest durable medical equipment store is [employer], located at [address] [place] [address].      Sincerely,  Dr. Watkins

## 2024-11-19 NOTE — PROGRESS NOTES
"Epilepsy Ambulatory Visit  Name: Izabela Moeller       : 1950       MRN: [id number]   Encounter Provider: Elvia Watkins MD   Encounter Date: 2024  Encounter department: [facility]    Izabela Moeller is a right-handed 74 y.o. female with PMH of left frontoparietal meningioma c/b s/p resection (), c/b right hemiparesis and epilepsy who presents as transfer of care from Dr. Giang.     She describes intermittent episodes of \"mild\" right hemibody twitching and right leg stiffening that do not bother her and occur every few weeks. She self-discontinued her levetiracetam due to side effects and does not want to start AEDs. She has never lost awareness with her seizures.     Today she complains of bothersome throbbing in the right hemibody. She also is worried about the posture of her right foot which tends to invert making it difficult for her to put on shoes. It is possible that the throbbing might be related to tightness from spasticity. I recommended botox but she was reluctant so I will start baclofen 5mg BID and see how she tolerates this. I will also refer her to PM&R for further management.     4-Dimensional Classification  Epileptic Paroxysmal Episode   Semiology: 1. R hemibody clonic 2. RLE tonic   Epileptogenic Zone: Left frontoparietal  Etiology: Left frontoparietal meningioma  Comorbidities: R hemiparesis  Current Epilepsy Medications: none  Prior Epilepsy Medications: levetiracetam 500-500    Assessment & Plan  Right hemiparesis (HCC)  With throbbing pain and tightness in her foot.   - we discussed botox injections but she is hesitant about this  - start baclofen 5mg BID  - referral to PM&R  Orders:    Ambulatory Referral to Physiatry; Future    Baclofen 5 MG TABS; Take 5 mg by mouth 2 (two) times a day    Partial symptomatic epilepsy with simple partial seizures, not intractable, without status epilepticus (HCC)  Does not want to take " "antiepileptics as her seizures do not bother her.  - monitor clinically  - continue seizure precautions          RTC 3 mo       HISTORY OF PRESENT ILLNESS    In 2020, she began to have tingling and numbness in her right toes, which started to progress to the whole right hemibody up to her neck. She also began to have episodes of twitching involving the whole right side, starting in her leg. She was eventually diagnosed with a meningioma which was resected. After surgery, she could barely move the right hemibody and had slurred speech. All of this has significantly improved.    She continues to have right hemibody weakness, mostly in the left, and wears a leg brace. She was previously in physical therapy but stopped last year. She uses a cane to ambulate. She continues to have tingling in the right hemibody that is bothersome to her, as well as a throbbing and tightness and heaviness feeling.     She continues to have episodes of \"light\" twitching involving the whole right side, about once a month. She also has episodes where the right leg stretches out and cramps. She has never lost awareness.     She was on Keppra, felt like she was losing her hair and getting constipated, so she stopped taking it. She has not noticed a significant different in her twitching or stiffening episodes.     Per witness ():   He would see her leg \"pulsating\" and shaking which would start to increase in frequency. This would last 5 minutes and eventually progress to her arm, but not her face or head.     Epilepsy Risk Factors: Patient is a product of uncomplicated full term pregnancy, met appropriate development milestones. No learning disabilities or cognitive delay. No h/o febrile seizures, CNS infections, or strokes.  There is no family h/o of seizures or epilepsy.    Prior Work-up:   MRI Brain: Personally reviewed by me, and shows: area of gliosis and encephalomalacia adjacent to the left central sulcus.   11/12/24: " "IMPRESSION:  Stable exam. Status post left frontal parietal vertex meningioma resection without evidence of residual recurrent disease.  Mild chronic microangiopathic changes.  REEGs (21):   This is a normal routine EEG. If a seizure disorder is considered clinically a repeat tracing with sleep deprivation may be of additional diagnostic value     Past Medical History:    Past Medical History:   Diagnosis Date    Arthritis     L shoulder    Brain compression (HCC) 2020    Hypercholesteremia     Pneumonia      Past Surgical History:   Procedure Laterality Date     SECTION      [place] CRNEC TREPHINE BONE FLAP MENINGIOMA SUPRATENTOR Left 10/15/2020    Procedure: Image guided left frontoparietal craniotomy for tumor;  Surgeon: Nick Diaz MD;  Location: [facility];  Service: Neurosurgery    TUBAL LIGATION         Family History:  Family History   Problem Relation Age of Onset    No Known Problems Mother     No Known Problems Sister      There is no family history of epilepsy.     Social History:  Social History     Tobacco Use    Smoking status: Never    Smokeless tobacco: Never   Vaping Use    Vaping status: Never Used   Substance Use Topics    Alcohol use: Yes     Comment: special occasions    Drug use: Never      Driving:  No    Allergies:  Allergies   Allergen Reactions    Gadolinium GI Intolerance    Gadolinium Derivatives Seizures    Trilipix [Choline Fenofibrate] Other (See Comments)     Pain Started in the upper/mid back and radiated into the front (chest area). Pt unaware of this response and is unsure.       Medications:    Current Outpatient Medications:     Baclofen 5 MG TABS, Take 5 mg by mouth 2 (two) times a day, Disp: 60 tablet, Rfl: 5      OBJECTIVE  /60 (BP Location: Left arm, Patient Position: Sitting, Cuff Size: Large)   Pulse 80   Ht 5' 5\" (1.651 m)   Wt 73.5 kg (162 lb)   SpO2 99%   BMI 26.96 kg/m²      Labs  I have reviewed pertinent labs:  CBC:   Lab Results "   Component Value Date    WBC 6.94 11/07/2024    RBC 4.60 11/07/2024    HGB 13.6 11/07/2024    HCT 41.7 11/07/2024    MCV 91 11/07/2024     11/07/2024    MCH 29.6 11/07/2024    MCHC 32.6 11/07/2024    RDW 12.4 11/07/2024    MPV 13.3 (H) 11/07/2024    NEUTROABS 4.00 11/05/2020     CMP:   Lab Results   Component Value Date    SODIUM 143 11/07/2024    K 3.8 11/07/2024     (H) 11/07/2024    CO2 27 11/07/2024    AGAP 7 11/07/2024    BUN 15 11/07/2024    CREATININE 0.76 11/07/2024    GLUC 157 (H) 11/07/2024    GLUF 106 (H) 11/09/2023    CALCIUM 9.5 11/07/2024    AST 26 11/07/2024    ALT 30 11/07/2024    ALKPHOS 68 11/07/2024    TP 7.1 11/07/2024    ALB 4.1 11/07/2024    TBILI 0.56 11/07/2024    EGFR 78 11/07/2024       Lab Results   Component Value Date/Time    TSH 2.77 07/14/2020 10:06 AM    CDJ4VGNAGNHB 1.675 11/07/2024 10:52 AM    HGBA1C 5.8 (H) 09/29/2020 02:10 PM       Lab Results   Component Value Date/Time    LEVETIRACETA <2.0 (L) 11/07/2024 10:52 AM    LEVETIRACETA 35.4 01/11/2021 12:42 PM         General Exam  GENERAL APPEARANCE:  No distress, alert, interactive and cooperative.  CARDIOVASCULAR: Warm and well perfused  LUNGS: normal work of breathing on room air  EXTREMITIES: no peripheral edema     Neurologic Exam  MENTAL STATE:  Orientation was normal to time, place and person. Recent and remote memory was intact.  Attention span and concentration were normal. Language testing was normal for comprehension, repetition, expression, and naming.      CRANIAL NERVES:  CN 2       visual fields full to confrontation.  CN 3, 4, 6  Pupils round, 4 mm in diameter, equally reactive to light. Lids symmetric; no ptosis. EOMs normal alignment, full range. No nystagmus.  CN 5  Facial sensation intact bilaterally.  CN 7  Normal and symmetric facial strength.   CN 8  Hearing intact to conversation.  CN 9  Palate elevates symmetrically.  CN 11  Normal strength of shoulder shrug and neck turning.  CN 12  Tongue  midline, with normal bulk and strength.     MOTOR:  Mild spasticity in RLE, and decreased range of motion in right foot. Right foot tends to spontaneously invert. No fasciculations, tremor or other abnormal movements were present. Strength is 5/5 on the left hemibody.        R  Deltoid:   4+   Biceps:   5  Triceps:   5  Wrist extension: 5  Finger extension:  5  Hip extension:  3+  Knee extension: 5  Knee flexion:  5-  Dorsiflexion:  0  Plantarflexion:  0       REFLEXES:  RIGHT UE   LEFT UE  BR:2              BR:2    Biceps:2      Biceps:2       RIGHT LLE   LEFT LLE    Knee:2           Knee:2    Ankle:0         Ankle:0    No clonus.     SENSORY:  She feels a tingling throughout the right hemibody. Decreased sensation to temperature in right leg. Right arm sensation is intact.       GAIT:  Station was unstable with circumduction of right leg. Ambulates with a cane.     Administrative Statements   The total amount of time spent with the patient and on chart review and documentation was 90 minutes. Issues addressed during this clinic visit included overall management, medication counseling or monitoring, and counseling and coordination of care.

## 2024-12-11 ENCOUNTER — OFFICE VISIT (OUTPATIENT)
Dept: NEUROSURGERY | Facility: CLINIC | Age: 74
End: 2024-12-11
Payer: MEDICARE

## 2024-12-11 VITALS
BODY MASS INDEX: 27.38 KG/M2 | TEMPERATURE: 98.3 F | HEIGHT: 65 IN | HEART RATE: 77 BPM | WEIGHT: 164.3 LBS | OXYGEN SATURATION: 98 % | SYSTOLIC BLOOD PRESSURE: 122 MMHG | DIASTOLIC BLOOD PRESSURE: 76 MMHG | RESPIRATION RATE: 16 BRPM

## 2024-12-11 DIAGNOSIS — R29.898 RIGHT ARM WEAKNESS: ICD-10-CM

## 2024-12-11 DIAGNOSIS — M21.371 RIGHT FOOT DROP: Primary | ICD-10-CM

## 2024-12-11 DIAGNOSIS — D32.9 BENIGN MENINGIOMA (HCC): ICD-10-CM

## 2024-12-11 PROCEDURE — 99215 OFFICE O/P EST HI 40 MIN: CPT | Performed by: PHYSICIAN ASSISTANT

## 2024-12-11 NOTE — ASSESSMENT & PLAN NOTE
Patient presents for annual follow up  S/p left frontoparietal craniotomy for mass resection on 10/15/2020 with Dr. Diaz  Pathology consistent with Grade 1 Meningioma.  Residual RLE weakness (AFO brace) and right sided numbness/tingling since surgery.    Imaging:  MRI brain w wo contrast 11/12/24: Stable exam. Status post left frontal parietal vertex meningioma resection without evidence of residual recurrent disease.Mild chronic microangiopathic changes.  Plan  Continue monitor neurological symptoms.  Dr. Diaz recommended she resume PT for her right UE/ foot/ankle symptoms. New referral to PT provided.   MRI imaging reviewed in detail with patient and compared to additional postoperative scans as well as her preoperative scan.  Expected findings without recurrent tumor.  Dr. Diaz recommended ongoing surveillance per NCCN guidelines in 1 year, though patient requested to follow up in 18 months with repeat MRI brain with and without contrast instead which Dr. Diaz agreed was reasonable given stable imaging since surgery over the last 4 years without tumor recurrence.   Pt needs pre treatment for contrasted imaging given contrast allergy: patient will call back to obtain the scripts for benadryl and prednisone, closer to her next follow up.   Pt made aware to contact the office with any questions or concerns in the interim.

## 2024-12-11 NOTE — PROGRESS NOTES
Neurosurgery Office Note  Izabela Moeller 74 y.o. female MRN: [id number]      Assessment & Plan     Benign meningioma (HCC)  Patient presents for annual follow up  S/p left frontoparietal craniotomy for mass resection on 10/15/2020 with Dr. Diaz  Pathology consistent with Grade 1 Meningioma.  Residual RLE weakness (AFO brace) and right sided numbness/tingling since surgery.    Imaging:  MRI brain w wo contrast 11/12/24: Stable exam. Status post left frontal parietal vertex meningioma resection without evidence of residual recurrent disease.Mild chronic microangiopathic changes.  Plan  Continue monitor neurological symptoms.  Dr. Diaz recommended she resume PT for her right UE/ foot/ankle symptoms. New referral to PT provided.   MRI imaging reviewed in detail with patient and compared to additional postoperative scans as well as her preoperative scan.  Expected findings without recurrent tumor.  Dr. Diza recommended ongoing surveillance per NCCN guidelines in 1 year, though patient requested to follow up in 18 months with repeat MRI brain with and without contrast instead which Dr. Diaz agreed was reasonable given stable imaging since surgery over the last 4 years without tumor recurrence.   Pt needs pre treatment for contrasted imaging given contrast allergy: patient will call back to obtain the scripts for benadryl and prednisone, closer to her next follow up.   Pt made aware to contact the office with any questions or concerns in the interim.       Diagnoses and all orders for this visit:    Right foot drop  -     Ambulatory Referral to Physical Therapy; Future    Right arm weakness  -     Ambulatory Referral to Physical Therapy; Future    Benign meningioma (HCC)  -     Ambulatory Referral to Physical Therapy; Future  -     MRI brain w wo contrast; Future  -     Creatinine, serum; Future  -     BUN; Future          I have spent a total time of 45 minutes on 12/11/24 in caring for this patient  including Diagnostic results, Instructions for management, Patient and family education, Impressions, Documenting in the medical record, Reviewing / ordering tests, medicine, procedures  , and Obtaining or reviewing history  .      CHIEF COMPLAINT    Chief Complaint   Patient presents with    Follow-up     Meningioma, s/p resection 10/2020, with MRI       HISTORY    History of Present Illness     74 y.o. year old female     This is a 74-year-old female with past medical history significant for hypercholesterolemia, meningioma status post resection of left frontoparietal Grade 1 Meningioma resection on 10/15/2020 by Dr. Diaz and rafael who presents today for annual follow-up.  Pathology consistent with grade 1 meningioma and patient did not require adjuvant radiation therapy.  She underwent updated MRI and presents today for review.    Today, patient continues with mild right-sided tingling/numbness which is stable.  She continues with mild residual weakness most noted in right ankle for which she wears an AFO brace.  Patient states without the brace her ankle continues to turn inward and her toes curl up. She is concerned that her toes have been curling up more frequently. She denies recent formal PT and reports her last session was last year.  She is ambulatory with the use of a cane. At times she states her right leg feels significantly heavy as if she is lifting to legs.    Patient is accompanied by her  to this visit.       REVIEW OF SYSTEMS    Review of Systems   Constitutional: Negative.    HENT:  Positive for nosebleeds (occasional) and tinnitus (infrequent).    Eyes:  Negative for visual disturbance.   Respiratory:  Positive for shortness of breath (with exertion). Negative for chest tightness.    Cardiovascular: Negative.    Gastrointestinal: Negative.  Negative for nausea and vomiting.        Wont go for 2/3 days and then has bad constipation    Endocrine: Negative.    Genitourinary: Negative.     Musculoskeletal:  Positive for back pain (lower back a little bit), gait problem (wearing brace and using cane), myalgias (sg horse middle of night) and neck pain (a little bit of neck pain from shoulder to neck into arm).        Drop foot, Right foot turns in and toes curl in once in awhile.   Still in PT twice a week.    Skin: Negative.    Allergic/Immunologic: Negative.    Neurological:  Positive for weakness (right sided - heaviness in right leg) and numbness (tingling right side,  unchanged). Negative for dizziness, tremors, seizures, speech difficulty, light-headedness and headaches.        MRI BRAIN 23   Hematological: Negative.         Neg AC   Psychiatric/Behavioral: Negative.  Negative for confusion and decreased concentration.    All other systems reviewed and are negative.      ROS obtained by MA. Reviewed. See HPI.     Meds/Allergies     Current Outpatient Medications   Medication Sig Dispense Refill    Baclofen 5 MG TABS Take 5 mg by mouth 2 (two) times a day 60 tablet 5     No current facility-administered medications for this visit.       Allergies   Allergen Reactions    Gadolinium GI Intolerance    Gadolinium Derivatives Seizures    Trilipix [Choline Fenofibrate] Other (See Comments)     Pain Started in the upper/mid back and radiated into the front (chest area). Pt unaware of this response and is unsure.       PAST HISTORY    Past Medical History:   Diagnosis Date    Arthritis     L shoulder    Brain compression (HCC) 2020    Hypercholesteremia     Pneumonia        Past Surgical History:   Procedure Laterality Date     SECTION      [place] CRNEC TREPHINE BONE FLAP MENINGIOMA SUPRATENTOR Left 10/15/2020    Procedure: Image guided left frontoparietal craniotomy for tumor;  Surgeon: Nick Diaz MD;  Location: [facility];  Service: Neurosurgery    TUBAL LIGATION         Social History     Tobacco Use    Smoking status: Never    Smokeless tobacco: Never   Vaping Use    Vaping  "status: Never Used   Substance Use Topics    Alcohol use: Yes     Comment: special occasions    Drug use: Never       Family History   Problem Relation Age of Onset    No Known Problems Mother     No Known Problems Sister      Above history personally reviewed.     EXAM    Vitals:Blood pressure 122/76, pulse 77, temperature 98.3 °F (36.8 °C), temperature source Temporal, resp. rate 16, height 5' 5\" (1.651 m), weight 74.5 kg (164 lb 4.8 oz), SpO2 98%, not currently breastfeeding.,Body mass index is 27.34 kg/m².     Physical Exam  Constitutional:       General: She is not in acute distress.     Appearance: Normal appearance. She is well-developed. She is not ill-appearing.   HENT:      Head: Normocephalic and atraumatic.      Right Ear: External ear normal.      Left Ear: External ear normal.      Nose: Nose normal.      Mouth/Throat:      Mouth: Mucous membranes are moist.   Eyes:      General: No scleral icterus.        Right eye: No discharge.         Left eye: No discharge.      Extraocular Movements: EOM normal.      Conjunctiva/sclera: Conjunctivae normal.   Cardiovascular:      Rate and Rhythm: Normal rate.   Pulmonary:      Effort: Pulmonary effort is normal. No respiratory distress.   Abdominal:      General: There is no distension.      Palpations: Abdomen is soft.   Skin:     General: Skin is warm and dry.      Findings: No rash.   Neurological:      Mental Status: She is alert.   Psychiatric:         Mood and Affect: Mood normal.         Speech: Speech normal.         Behavior: Behavior normal.         Thought Content: Thought content normal.         Judgment: Judgment normal.       Neurologic Exam     Mental Status   Follows 3 step commands.   Attention: normal. Concentration: normal.   Speech: speech is normal   Level of consciousness: alert  Knowledge: good.   Normal comprehension.     Cranial Nerves     CN III, IV, VI   Extraocular motions are normal.   Upgaze: normal  Conjugate gaze: present    CN V "   Facial sensation intact.     CN VII   Facial expression full, symmetric.     CN VIII   Hearing: intact    CN XI   Right trapezius strength: normal  Left trapezius strength: normal    CN XII   Tongue: not atrophic  Fasciculations: absent  Tongue deviation: none    Motor Exam     Strength   Strength 5/5 except as noted. RLE 4-4+ HF/KF/KE, AFO brace on ankle     Sensory Exam   Left arm light touch: normal  Left leg light touch: normal  Slight tingling to LT RUE (Prox>dist) and RLE (distal>prox)     Gait, Coordination, and Reflexes     Tremor   Resting tremor: absent  Intention tremor: absent  Action tremor: absent    Reflexes   Right Miguel: absent  Left Miguel: absent    Neurological Exam  Mental Status  Alert. Speech is normal.    Cranial Nerves  CN III, IV, VI: Extraocular movements intact bilaterally.  CN V: Facial sensation is normal.  CN XI:  Right: Trapezius strength is normal.  Left: Trapezius strength is normal.  CN XII:No tongue atrophyTongue without fasciculations    Motor    RLE 4-4+ HF/KF/KE, AFO brace on ankle.    Sensory  Slight tingling to LT RUE (Prox>dist) and RLE (distal>prox).    Reflexes    Right pathological reflexes: Dejon's absent.  Left pathological reflexes: Dejon's absent.    MEDICAL DECISION MAKING    Imaging Studies:     MRI brain w wo contrast    Result Date: 11/15/2023  Narrative: MRI BRAIN WITH AND WITHOUT CONTRAST INDICATION: D32.9: Benign neoplasm of meninges, unspecified. COMPARISON: MRI brain 11/9/2022, 9/2/2020; 10/15/2020 TECHNIQUE: Multiplanar, multisequence imaging of the brain was performed before and after gadolinium administration. Imaging performed on 1.5T MRI IV Contrast:  7 mL of Gadobutrol injection (SINGLE-DOSE) IMAGE QUALITY:   Diagnostic. FINDINGS: BRAIN PARENCHYMA: Stable encephalomalacia with surrounding gliosis in the left frontoparietal region status post resection of meningioma. No abnormal enhancement to suggest recurrence of disease. Hemosiderin  staining in the resection cavity subjacent to left parietal craniotomy is stable. There is no discrete mass, mass effect or midline shift. There is no intracranial hemorrhage.  Normal posterior fossa.  Diffusion imaging is unremarkable. Stable small scattered hyperintensities on T2/FLAIR imaging are noted in the periventricular and subcortical white matter demonstrating an appearance that is statistically most likely to represent mild microangiopathic change. VENTRICLES: Slight ex vacuo dilatation of the body and occipital horn left lateral ventricle is stable. SELLA AND PITUITARY GLAND:  Normal. ORBITS:  Normal. PARANASAL SINUSES:  Normal. VASCULATURE:  Evaluation of the major intracranial vasculature demonstrates appropriate flow voids. CALVARIUM AND SKULL BASE: Marrow signal heterogeneity within the calvarium could be suggestive of anemia. EXTRACRANIAL SOFT TISSUES:  Normal.     Impression: 1. Stable postop changes, status post resection of left parietal meningioma. No evidence of recurrent tumor. 2. Mild, chronic microangiopathy elsewhere is stable. 3. No acute infarction, intracranial hemorrhage or new mass lesion. Resident: KRISTIN LAGUNA I, the attending radiologist, have reviewed the images and agree with the final report above. Workstation performed: [id number]       I have personally reviewed pertinent reports.   and I have personally reviewed pertinent films in PACS

## 2024-12-30 ENCOUNTER — EVALUATION (OUTPATIENT)
Age: 74
End: 2024-12-30
Payer: MEDICARE

## 2024-12-30 DIAGNOSIS — D32.9 BENIGN MENINGIOMA (HCC): Primary | ICD-10-CM

## 2024-12-30 DIAGNOSIS — M21.371 RIGHT FOOT DROP: ICD-10-CM

## 2024-12-30 DIAGNOSIS — R29.898 RIGHT ARM WEAKNESS: ICD-10-CM

## 2024-12-30 PROCEDURE — 97530 THERAPEUTIC ACTIVITIES: CPT

## 2024-12-30 PROCEDURE — 97162 PT EVAL MOD COMPLEX 30 MIN: CPT

## 2024-12-30 NOTE — PROGRESS NOTES
PT Evaluation          POC expires Unit limit Auth Expiration date PT/OT + Visit Limit? Co-Insurance   3/30/25 N/a pend BOMN Yes                               Visit/Unit Tracking                                                       Today's date: 2024  Patient name: Izabela Moeller  : 1950  MRN: [id number]  Referring provider: Marcie Parson PA-C  Dx:   Encounter Diagnosis     ICD-10-CM    1. Benign meningioma (HCC)  D32.9             Assessment  Assessment details: Patient is a 74 y.o. Female who presents to skilled outpatient PT with impaired mobility with RLE hemiparesis 2/2 brain tumor removal in . Pt presents with associated impairments, including RLE synergy patterning, abnormal gait pattern, reduced gait speed, reduced LE strength/power, impaired functional mobility, and fall risk. Utilizing quad cane and AFO with household and community ambulation. Pt has goal of increased toe movement/strength, improved balance, ambulation without R AFO, and reduced spasticity. Provided extensive patient education and will continue to educate on realistic expectations due to pt's spasticity and synergy patterns. Next visit will also include formal assessment of endurance and static/dynamic balance (6MWT and TRIMBLE/FGA) as these were not assessed today due to time limitations and focus on patient education, and will provide basic HEP NV. Pt will benefit from skilled PT interventions for gait training, balance training, neuromuscular reeducation, strengthening, endurance training, and functional task practice to assist pt in improving current level of mobility and increasing QOL. Educated patient extensively on PT implication and limitations, PT POC (NMES for HEP; will bring in unit in upcoming visits), importance of inc physical activity, need for AFO to reduce foot/toe drag (inc risk of falls), f/u with neurology for spasticity  management, and Botox injections.    Impairments: Abnormal coordination, Abnormal gait, Abnormal muscle tone, Abnormal or restricted ROM, Activity intolerance, Impaired balance, Impaired physical strength, Lacks appropriate HEP, Poor posture, Poor body mechanics, Pain with function, Safety issue, Weight-bearing intolerance, Abnormal movement, Difficulty understanding, Abnormal muscle firing  Understanding of Dx/Px/POC: Good  Prognosis: Good    Patient verbalized understanding of POC.         Please contact me if you have any questions or recommendations. Thank you for the referral and the opportunity to share in Izabela Coynenatyhusam's care.        Plan  Plan details:   Patient would benefit from: Skilled PT  Planned modality interventions: Biofeedback, Cryotherapy, TENS, Thermotherapy  Planned therapy interventions: Abdominal trunk stabilization, ADL training, Balance, Balance/WB training, Breathing training, Body mechanics training, Coordination, Functional ROM exercises, Gait training, HEP, Joint Mobilization, Manual Therapy, Brock taping, Motor coordination training, Neuromuscular re-education, Patient education, Postural training, Strengthening, Stretching, Therapeutic activities, Therapeutic exercises, Therapeutic training, Transfer training, Activity modification, Work reintegration  Frequency: 1-2x/wk  Duration in weeks: 12 weeks  Plan of Care beginning date: 12/30/2024  Plan of Care expiration date: 3 months - 3/30/2025  Treatment plan discussed with: Patient       Goals  Short Term Goals (4 weeks):    - Patient will improve time on TUG by 2.9 seconds to facilitate improved safety in all ambulation  - Patient will be independent in basic HEP 2-3 weeks  - Patient will improve 5xSTS score by 2.3 seconds to promote improved LE functional strength needed for ADLs  - Patient will perform TRIMBLE  - Patient will perform 6MWT    Long Term Goals (12 weeks):  - Patient will be independent in a comprehensive home  exercise program  - Patient will improve scoring on DGI by 2.6 points to progress safety  - Patient will improve gait speed by 0.18 m/s to improve safety with community ambulation  - Patient will improve TRIMBLE by 6 points in order to improve static balance and reduce risk for falls  - Patient will improve 6 Minute Walk Test score by 190 feet to promote improved cardiovascular endurance  - Patient will report 50% reduction in near falls in order to improve safety with functional tasks and reduce his risk for falls  - Patient will report going on walks at least 3 days per week to promote independence and improved cardiovascular endurance  - Patient will be able to ascend/descend stairs reciprocally with 1 UE assist to promote independence and safety with ADLs  - Patient will report 50% reduction in near falls when ambulating on uneven terrain      Cut off score    All date taken from [facility] Neuro Section or Rehab Measures      Trimble/56  MDC: 6 pts  Age Norms:  60-69: M - 55   F - 55  70-79: M - 54   F - 53  80-89: M - 53   F - 50 5xSTS: Tod et al 2010  MDC: 2.3 sec  Age Norms:  60-69: 11.4 sec  70-79: 12.6 sec  80-89: 14.8 sec   TUG  MDC: 4.14 sec  Cut off score:  >13.5 sec community dwelling adults  >32.2 frail elderly  <20 I for basic transfers  >30 dependent on transfers 10 Meter Walk Test: Harjit and Jude et al 2011  MDC: 0.18 m/s  20-29: M - 1.35 m   F - 1.34 m  30-39: M - 1.43 m   F - 1.34 m  40-49: M - 1.43 m   F - 1.39 m  50-59: M - 1.43 m   F - 1.31 m  60-69: M - 1.34 m   F - 1.24 m  70-79: M - 1.26 m   F - 1.13 m  80-89: M - 0.97 m   F - 0.94 m    Household Ambulator < 0.4 m/s  Limited Community Ambulator 0.4 - 0.8 m/s  Community Ambulator 0.8 - 1.2 m/s  Safely cross the street > 1.2 m/s   FGA  MCID: 4 pts  Geriatrics/community < 22/30 fall risk  Geriatrics/community < 20/30 unexplained falls    DGI  MDC: vestibular - 4 pts  MDC: geriatric/community - 3 pts  Falls risk </24 mCTSIB  Norm: 20-60  yrs  Eyes open firm: norm sway 0.21-0.48  Eyes closed firm: norm sway 0.48-0.99  Eyes open foam: norm sway 0.38-0.71  Eyes closed foam: norm sway 0.70-2.22   6 Minute Walk Test  MDC: 190.98 ft  MCID: 164 ft    Age Norms  60-69: M - 1876 ft (571.80 m)  F - 1765 ft (537.98 m)  70-79: M - 1729 ft (527.00 m)  F - 1545 ft (470.92 m)  80-89: M - 1368 ft (416.97 m)  F - 1286 ft (391.97 m) ABC: Yves & Soto, 2003  <67% increased risk for falls   Vancourt-Laurie Monofilaments  Evaluator Size:        Force (grams):          Hand/Dorsal Thresholds:        Plantar Thresholds:  - 1.65                       - 0.008                       - Normal                                 - Normal  - 2.36                       - 0.02                         - Normal                                 - Normal  - 2.44                       - 0.04                         - Normal                                 - Normal  - 2.83                       - 0.07                         - Normal                                 - Normal  - 3.22                       - 0.16                         - Diminished light touch          - Normal  - 3.61                       - 0.40                         - Diminished light touch          - Normal  - 3.84                       - 0.60                         - Diminished protective           - Diminished light touch  - 4.08                       - 1.00                         - Diminished protective           - Diminished light touch  - 4.17                       - 1.40                         - Diminished protective           - Diminished light touch  - 4.31                       - 2.00                         - Diminished protective           - Diminished light touch  - 4.56                       - 4.00                         - Loss of protective sense      - Diminished protective  - 4.74                       - 6.00                         - Loss of protective sense      - Diminished protective  -  4.93                       - 8.00                         - Loss of protective sense      - Diminished protective  - 5.07                       - 10.0                         - Loss of protective sense     - Loss of protective sense  - 5.18                       - 15.0                         - Loss of protective sense     - Loss of protective sense  - 5.46                       - 26.0                         - Loss of protective sense     - Loss of protective sense  - 5.88                       - 60.0                         - Loss of protective sense     - Loss of protective sense  - 6.10                       - 100                          - Loss of protective sense     - Loss of protective sense  - 6.45                       - 180                          - Loss of protective sense     - Loss of protective sense  - 6.65                       - 300                          - Deep pressure sense only  - Deep pressure sense only       Subjective    History of Present Illness  - Mechanism of injury: Meningioma s/p L frontoparietal craniotomy (2020) resulting in residual R-sided heaviness, weakness (LE > UE), numbness, and tingling. Patient arrives with primary complaint of lack of toe movement (absent for past year) and excessive supination. Shares it has worsened in recent months.   - Primary AD: narrow base QC  - Assist level at home: mod IND (needs assistance with R AFO don)  - Decreased fine motor tasks: No    Patient goal:   Get the toes moving again  Reduce inversion/supination  Reduce heaviness in R LE    Pain  - Location: R-sided LBP  - Aggravating factors: rotation    Social Support  - Steps to enter house: 1 VIOLET  - Stairs in house: 13 with R HR, inc performance time  - Lives in: [facility]  - Lives with:  and son    - Employment status: retired retail sales  - Hand dominance: R-handed    Treatments  - Previous treatment: outpatient PT  - Current treatment: neurology  - Diagnostic Testing: see  chart      Objective     LE MMT  - R Hip Flexion: 3+/5   L Hip Flexion: 4-/5  - R Hip Extension: 3+/5  L Hip Extension: 4-/5  - R Hip Abduction: 4-/5  L Hip Abduction: 4-/5  - R Hip Adduction: 4-/5  L Hip Adduction: 4-/5  - R Knee Extension: 4+/5  L Knee Extension: 4+/5  - R Knee Flexion: 3+/5  L Knee Flexion: 4+/5  - R Ankle DF: 0/5   L Ankle DF: 4+/5  - R Ankle PF: 2/5   L Ankle PF: 4+/5    Sensation  - Light touch: wfl  - Deep pressure: wfl    Coordination  - Heel to Shin: defer  - Alternate Toe Taps: defer  - Finger to Nose: slightly dysmetric on R, improved accuracy with reduced speed    Reflexes/Clonus  - Clonus: No    Modified Marlo   - R knee extension: 2 - easily moved, but more marked tone throughout most ROM   - L knee extension: 0 - no increase in tone  - R knee flexion: 1 - catch and release or minimum resistance at end range    - L knee flexion: 0 - no increase in tone  - R ankle DF: 1 - catch and release or minimum resistance at end range     - L ankle DF: 0 - no increase in tone  - R ankle inversion: 1 - catch and release or minimum resistance at end range    - L ankle inversion: 0 - no increase in tone  - R ankle eversion: 1+ - catch followed by minimum resistance throughout remainder (< half ROM)  - L ankle eversion: 0 - no increase in tone           Outcome Measures Initial Eval  12/30        5xSTS 16.95 sec no UE, 5/7 reps (2 failed due to insufficient ant WS and poor R foot placement)        TUG  - Regular     19.98 sec with QC          10 meter 0.39 m/s with QC        TRIMBLE NV        FGA defer        DGI defer        mCTSIB  - FTEO (firm)  - FTEC (firm)  - FTEO (foam)  - FTEC (foam)   defer        6MWT NV        ABC 45%                            Precautions: epilepsy, hx meningioma, arthritis, pneumonia     Past Medical History:   Diagnosis Date    Arthritis     L shoulder    Brain compression (HCC) 9/11/2020    Hypercholesteremia     Pneumonia

## 2025-01-07 ENCOUNTER — OFFICE VISIT (OUTPATIENT)
Age: 75
End: 2025-01-07
Payer: MEDICARE

## 2025-01-07 DIAGNOSIS — R29.898 RIGHT ARM WEAKNESS: ICD-10-CM

## 2025-01-07 DIAGNOSIS — D32.9 BENIGN MENINGIOMA (HCC): Primary | ICD-10-CM

## 2025-01-07 DIAGNOSIS — M21.371 RIGHT FOOT DROP: ICD-10-CM

## 2025-01-07 PROCEDURE — 97112 NEUROMUSCULAR REEDUCATION: CPT

## 2025-01-07 NOTE — PROGRESS NOTES
"Daily Note     Today's date: 2025  Patient name: Izabela Moeller  : 1950  MRN: [id number]  Referring provider: Marcie Parson PA-C  Dx:   Encounter Diagnosis     ICD-10-CM    1. Benign meningioma (HCC)  D32.9       2. Right foot drop  M21.371       3. Right arm weakness  R29.898           Start Time: 1630  Stop Time: 1715  Total time in clinic (min): 45 minutes    Subjective: Patient reports that her right foot tries turn in and her toes curl sometimes which causes her to lose her balance.      Objective: See treatment diary below    NMR:  STS No UE support x 10 v.c. for RLE placement  Short //bars 1 UE support Fwd/Bwd walking x 5 laps RLE circumducts Fwd amb  Short //bars 1 UE support side stepping x 5 laps  Short //bars 1 UE support Low hurdles (4) Fwd step over step x 5 laps, side stepping x 5 laps  Short //bars 4\" step ups 10x ea F/L  Short //bar on airex FA/EO/EC 30\" x 2 ea            Assessment: Tolerated treatment well.   First session after initial evaluation.  Patient participated in skilled PT session focused on balance, gait, and endurance.  Patient able to complete exercise program with no issues.   Patient with increased RLE circumduction with stepping over hurdles, v.c. to correct.  Patient experience increase R toes curling during balance on airex.  Patient would continue to benefit from skilled PT interventions to address deficits with balance, gait, and endurance. Patient demonstrated fatigue post treatment      Plan: Continue per plan of care.        POC expires Unit limit Auth Expiration date PT/OT + Visit Limit? Co-Insurance   3/30/25 N/a pend BOMN Yes                               Visit/Unit Tracking  2025              2                                    "

## 2025-01-09 ENCOUNTER — OFFICE VISIT (OUTPATIENT)
Age: 75
End: 2025-01-09
Payer: MEDICARE

## 2025-01-09 DIAGNOSIS — R29.898 RIGHT ARM WEAKNESS: ICD-10-CM

## 2025-01-09 DIAGNOSIS — D32.9 BENIGN MENINGIOMA (HCC): Primary | ICD-10-CM

## 2025-01-09 DIAGNOSIS — M21.371 RIGHT FOOT DROP: ICD-10-CM

## 2025-01-09 PROCEDURE — 97112 NEUROMUSCULAR REEDUCATION: CPT

## 2025-01-09 NOTE — PROGRESS NOTES
"Daily Note     Today's date: 2025  Patient name: Izabela Moeller  : 1950  MRN: [id number]  Referring provider: Marcie Parson PA-C  Dx:   Encounter Diagnosis     ICD-10-CM    1. Benign meningioma (HCC)  D32.9       2. Right foot drop  M21.371       3. Right arm weakness  R29.898                      Subjective: Patient reports that her right foot tries turn in and her toes curl sometimes which causes her to lose her balance.      Objective: See treatment diary below    NMR in // bars:  STS no UE support 2x10 v.c. for RLE placement   Addition of Y MB for second set  Fwd/Bwd amb with 1UE assist, vc to reduce circumduction  x 5 laps  Speed amb with L ' x 1  4\" lat step up 2 x 10 with R LE only  VC/TC for glute med cx to reduce [place]   Mod tandem amb with 1UE assist 3 laps  Large amplitude lat amb, 0-1 UE support, 4 laps  Hurdles 5 x 6\" fwd and lat with L support on // bars, 3 laps/ea    Assessment: Tolerated treatment well. Patient participated in skilled PT session focused on balance, gait, and endurance.  Patient able to complete exercise program with no issues.   Patient with increased RLE circumduction with stepping over hurdles, v.c. to correct. Patient would continue to benefit from skilled PT interventions to address deficits with balance, gait, and endurance. Patient demonstrated fatigue post treatment      Plan: Continue per plan of care.        POC expires Unit limit Auth Expiration date PT/OT + Visit Limit? Co-Insurance   3/30/25 N/a pend BOMN Yes                               Visit/Unit Tracking                                                   "

## 2025-01-14 ENCOUNTER — OFFICE VISIT (OUTPATIENT)
Age: 75
End: 2025-01-14
Payer: MEDICARE

## 2025-01-14 DIAGNOSIS — D32.9 BENIGN MENINGIOMA (HCC): Primary | ICD-10-CM

## 2025-01-14 DIAGNOSIS — M21.371 RIGHT FOOT DROP: ICD-10-CM

## 2025-01-14 DIAGNOSIS — R29.898 RIGHT ARM WEAKNESS: ICD-10-CM

## 2025-01-14 PROCEDURE — 97112 NEUROMUSCULAR REEDUCATION: CPT

## 2025-01-14 NOTE — PROGRESS NOTES
"Daily Note     Today's date: 2025  Patient name: Izabela Moeller  : 1950  MRN: [id number]  Referring provider: Marcie Parson PA-C  Dx:   Encounter Diagnosis     ICD-10-CM    1. Benign meningioma (HCC)  D32.9       2. Right foot drop  M21.371       3. Right arm weakness  R29.898                      Subjective: Patient reports feeling okay      Objective: See treatment diary below    NMR in // bars:  STS no UE support 2x10 v.c. for RLE placement   Addition of Y MB for second set  Fwd/Bwd amb with 1UE assist, vc to reduce circumduction  x 5 laps  Speed amb with L ' x 1  4\" lat step up 2 x 10 with R LE only  VC/TC for glute med cx to reduce [place]   Mod tandem amb with 1UE assist 3 laps  Large amplitude lat amb, 0-1 UE support, 4 laps  Hurdles 5 x 6\" fwd and lat with L support on // bars, 3 laps/ea    Assessment: Tolerated treatment well. Patient participated in skilled PT session focused on balance, gait, and endurance. Patient brought in stim unit, tried to turn it on but it was dead. Advised patient to bring in a new battery for next session. Patient experienced no loss of balance. Patient does tend to circumduct when going backwards. After curing patient to increase hip flexion she was able to clear the ground. Patient would continue to benefit from skilled PT interventions to address deficits with balance, gait, and endurance. Patient demonstrated fatigue post treatment      Plan: Continue per plan of care.        POC expires Unit limit Auth Expiration date PT/OT + Visit Limit? Co-Insurance   3/30/25 N/a pend BOMN Yes                               Visit/Unit Tracking                                                  "

## 2025-01-16 ENCOUNTER — OFFICE VISIT (OUTPATIENT)
Age: 75
End: 2025-01-16
Payer: MEDICARE

## 2025-01-16 DIAGNOSIS — M21.371 RIGHT FOOT DROP: ICD-10-CM

## 2025-01-16 DIAGNOSIS — R29.898 RIGHT ARM WEAKNESS: ICD-10-CM

## 2025-01-16 DIAGNOSIS — D32.9 BENIGN MENINGIOMA (HCC): Primary | ICD-10-CM

## 2025-01-16 PROCEDURE — 97112 NEUROMUSCULAR REEDUCATION: CPT

## 2025-01-16 NOTE — PROGRESS NOTES
"Daily Note     Today's date: 2025  Patient name: Izabela Moeller  : 1950  MRN: [id number]  Referring provider: Marcie Parson PA-C  Dx:   Encounter Diagnosis     ICD-10-CM    1. Benign meningioma (HCC)  D32.9       2. Right foot drop  M21.371       3. Right arm weakness  R29.898           Start Time: 1545  Stop Time: 1630  Total time in clinic (min): 45 minutes    Subjective: Patient reports feeling okay. Hasn't gotten battery for stim unit yet. No new falls. Felt good after last session.       Objective: See treatment diary below    NMR in // bars:  STS no UE support 2x10 v.c. for RLE placement - YMB   Fwd/bwd amb L HHA 60' total x5   Speed amb with L ' x 1  Side stepping GTB 3x laps   4\" lat step up 2 x 10 with R LE only  VC/TC for glute med cx to reduce [place]   Mod tandem amb with 1UE assist 3 laps   Hurdles 5 x 6\" fwd and lat with L support on // bars, 3 laps/ea  Step tap stool 6\" x30 ea //bar   Fwd cone taps //bar x3 laps     Assessment: Tolerated treatment well. Patient participated in skilled PT session focused on balance, gait, and endurance. Did not have stim unit battery. Patient experienced no loss of balance. Patient does circumduct with bethanie navigation, when becoming fatigued using vaulting compensation. . After curing patient to increase hip flexion she was able to clear the ground. Added further motor control challenges with cone taps that proved to be a challenge but improved nicely intra-session. Would try some compliant surface training nv. Patient would continue to benefit from skilled PT interventions to address deficits with balance, gait, and endurance. Patient demonstrated fatigue post treatment      Plan: Continue per plan of care.        POC expires Unit limit Auth Expiration date PT/OT + Visit Limit? Co-Insurance   3/30/25 N/a pend BOMN Yes                               Visit/Unit Tracking                                                  "

## 2025-01-21 ENCOUNTER — OFFICE VISIT (OUTPATIENT)
Age: 75
End: 2025-01-21
Payer: MEDICARE

## 2025-01-21 DIAGNOSIS — D32.9 BENIGN MENINGIOMA (HCC): Primary | ICD-10-CM

## 2025-01-21 DIAGNOSIS — M21.371 RIGHT FOOT DROP: ICD-10-CM

## 2025-01-21 DIAGNOSIS — R29.898 RIGHT ARM WEAKNESS: ICD-10-CM

## 2025-01-21 PROCEDURE — 97530 THERAPEUTIC ACTIVITIES: CPT

## 2025-01-21 PROCEDURE — 97112 NEUROMUSCULAR REEDUCATION: CPT

## 2025-01-21 NOTE — PROGRESS NOTES
"Daily Note     Today's date: 2025  Patient name: Izabela Moeller  : 1950  MRN: [id number]  Referring provider: Marcie Parson PA-C  Dx:   Encounter Diagnosis     ICD-10-CM    1. Benign meningioma (HCC)  D32.9       2. Right foot drop  M21.371       3. Right arm weakness  R29.898                      Subjective: Patient reports feeling okay. Denies falls or changes. Arrives with battery pack.      Objective: See treatment diary below    NMR in // bars:  STS no UE support 2x10 v.c. for RLE placement - YMB   Seated ankle DF with NMES alterate, 10s on 10s off 10 min   Edu on ACTIVE cx during stim  March with, 3s iso, 1UE support 3 laps  Side stepping GTB 3 laps     TA  Patient edu   Set parameters on patients NMES unit   Edu on DF pad placement   Alternate NMES with DF 10s on 10s off, dosage (10-15 min), 4x/wk   HEP of AAROM for DF, toe flex/ext, eversion S, dosage   Lack of movement, plausible causes include spasticity and/or contractions   Causes of spasticity       NOT TODAY -  Fwd/bwd amb L HHA 60' total x5   Speed amb with L ' x 1  4\" lat step up 2 x 10 with R LE only  VC/TC for glute med cx to reduce [place]   Mod tandem amb with 1UE assist 3 laps   Hurdles 5 x 6\" fwd and lat with L support on // bars, 3 laps/ea  Step tap stool 6\" x30 ea //bar   Fwd cone taps //bar x3 laps     Assessment: Tolerated treatment well. Patient participated in skilled PT session focused on balance, gait, and endurance. Patient experienced no loss of balance. Can trial compliant surfaces NV. Set NMES unit for HEP with patient verbalizing understanding. Patient would continue to benefit from skilled PT interventions to address deficits with balance, gait, and endurance. Patient demonstrated fatigue post treatment      Plan: Continue per plan of care.        POC expires Unit limit Auth Expiration date PT/OT + Visit Limit? Co-Insurance   3/30/25 N/a pend BOMN Yes                               Visit/Unit " Tracking  12/30 1/7 1/9 1/14 1/16 1/21

## 2025-01-23 ENCOUNTER — OFFICE VISIT (OUTPATIENT)
Age: 75
End: 2025-01-23
Payer: MEDICARE

## 2025-01-23 DIAGNOSIS — R29.898 RIGHT ARM WEAKNESS: ICD-10-CM

## 2025-01-23 DIAGNOSIS — M21.371 RIGHT FOOT DROP: ICD-10-CM

## 2025-01-23 DIAGNOSIS — D32.9 BENIGN MENINGIOMA (HCC): Primary | ICD-10-CM

## 2025-01-23 PROCEDURE — 97112 NEUROMUSCULAR REEDUCATION: CPT

## 2025-01-23 NOTE — PROGRESS NOTES
"Daily Note     Today's date: 2025  Patient name: Izabela Moeller  : 1950  MRN: [id number]  Referring provider: Marcie Parson PA-C  Dx:   Encounter Diagnosis     ICD-10-CM    1. Benign meningioma (HCC)  D32.9       2. Right foot drop  M21.371       3. Right arm weakness  R29.898             Start Time: 1415  Stop Time: 1456  Total time in clinic (min): 41 minutes    Subjective: Patient reports feeling good. Felt good after last session. Has been using NMES at home.       Objective: See treatment diary below    NMR in // bars:  STS no UE support 2x10 v.c. for RLE placement -  with, 3s iso, 1UE support 3 laps  Side stepping GTB 3 laps   Side stepping foam beam 3x laps   Step onto  airex fwd and lat 3x laps   Rockerboard UUE 30x ea M/L and F/B   Lateral step ups 4\" cue for glute med   Stand on airex around the world 5# KB       NOT TODAY -  Fwd/bwd amb L HHA 60' total x5   Speed amb with L ' x 1  4\" lat step up 2 x 10 with R LE only  VC/TC for glute med cx to reduce [place]   Mod tandem amb with 1UE assist 3 laps   Hurdles 5 x 6\" fwd and lat with L support on // bars, 3 laps/ea  Step tap stool 6\" x30 ea //bar   Fwd cone taps //bar x3 laps   Seated ankle DF with NMES alterate, 10s on 10s off 10 min   Edu on ACTIVE cx during stim    Assessment: Tolerated treatment well. Patient participated in skilled PT session focused on balance, gait, and endurance. Does fairly with compliant surfaces, still has some trouble that requires handheld assist while on compliant surface. Patient would continue to benefit from skilled PT interventions to address deficits with balance, gait, and endurance. Has to be mindful of where her ankle is in space.  Patient demonstrated fatigue post treatment      Plan: Continue per plan of care.        POC expires Unit limit Auth Expiration date PT/OT + Visit Limit? Co-Insurance   3/30/25 N/a pend BOMN Yes                               Visit/Unit " Tracking  12/30 1/7 1/9 1/14 1/16 1/21

## 2025-01-28 ENCOUNTER — OFFICE VISIT (OUTPATIENT)
Age: 75
End: 2025-01-28
Payer: MEDICARE

## 2025-01-28 DIAGNOSIS — D32.9 BENIGN MENINGIOMA (HCC): Primary | ICD-10-CM

## 2025-01-28 DIAGNOSIS — R29.898 RIGHT ARM WEAKNESS: ICD-10-CM

## 2025-01-28 DIAGNOSIS — M21.371 RIGHT FOOT DROP: ICD-10-CM

## 2025-01-28 PROCEDURE — 97112 NEUROMUSCULAR REEDUCATION: CPT

## 2025-01-28 PROCEDURE — 97530 THERAPEUTIC ACTIVITIES: CPT

## 2025-01-28 NOTE — PROGRESS NOTES
"Daily Note     Today's date: 2025  Patient name: Izabela Moeller  : 1950  MRN: [id number]  Referring provider: Marcie Parson PA-C  Dx:   Encounter Diagnosis     ICD-10-CM    1. Benign meningioma (HCC)  D32.9       2. Right foot drop  M21.371       3. Right arm weakness  R29.898                        Subjective: Patient reports feeling good. Felt good after last session. Has been using NMES at home.       Objective: See treatment diary below    NMR in // bars:  STS no UE support 2x12 v.c. for RLE placement - YMB   March with, 3s iso, 1UE support 3 laps  Mod tandem amb 1UE support 2 laps  Side stepping GTB 3 laps   Lateral step ups 4\" cue for glute med 2 x 10/ea  Side stepping foam beam 3 laps     TM 8 min 5% incline at 0.8 mph        NOT TODAY -  Fwd/bwd amb L HHA 60' total x5   Speed amb with L ' x 1  4\" lat step up 2 x 10 with R LE only  VC/TC for glute med cx to reduce [place]   Mod tandem amb with 1UE assist 3 laps   Hurdles 5 x 6\" fwd and lat with L support on // bars, 3 laps/ea  Step tap stool 6\" x30 ea //bar   Step onto  airex fwd and lat 3x laps   Rockerboard UUE 30x ea M/L and F/B   Stand on airex around [place] 5# KB       Assessment: Tolerated treatment well. Patient participated in skilled PT session focused on balance, gait, and endurance. Does fairly with compliant surfaces, still has some trouble that requires handheld assist while on compliant surface. Patient would continue to benefit from skilled PT interventions to address deficits with balance, gait, and endurance. Has to be mindful of where her ankle is in space.  Patient demonstrated fatigue post treatment    Would like to improve endurance; trial ending sessions on TM with incline due to deficits noted at home.      Plan: Continue per plan of care.        POC expires Unit limit Auth Expiration date PT/OT + Visit Limit? Co-Insurance   3/30/25 N/a pend BOMN Yes                               Visit/Unit " Tracking  12/30 1/7 1/9 1/14 1/16 1/21 1/28

## 2025-01-30 ENCOUNTER — EVALUATION (OUTPATIENT)
Age: 75
End: 2025-01-30
Payer: MEDICARE

## 2025-01-30 DIAGNOSIS — M21.371 RIGHT FOOT DROP: ICD-10-CM

## 2025-01-30 DIAGNOSIS — D32.9 BENIGN MENINGIOMA (HCC): Primary | ICD-10-CM

## 2025-01-30 DIAGNOSIS — R29.898 RIGHT ARM WEAKNESS: ICD-10-CM

## 2025-01-30 PROCEDURE — 97112 NEUROMUSCULAR REEDUCATION: CPT

## 2025-01-30 NOTE — PROGRESS NOTES
PT Evaluation / Progress Note         POC expires Unit limit Auth Expiration date PT/OT + Visit Limit? Co-Insurance   3/30/25 N/a pend BOMN Yes                               Visit/Unit Tracking   PN                                              Today's date: 2025  Patient name: Izabela Moeller  : 1950  MRN: [id number]  Referring provider: Marcie Parson PA-C  Dx:   Encounter Diagnosis     ICD-10-CM    1. Benign meningioma (HCC)  D32.9       2. Right foot drop  M21.371       3. Right arm weakness  R29.898             Assessment  Assessment details: Patient is a 74 y.o. Female who presents to skilled outpatient PT with impaired mobility with RLE hemiparesis 2/2 brain tumor removal in . Pt presents with associated impairments, including RLE synergy patterning, abnormal gait pattern, reduced gait speed, reduced LE strength/power, impaired functional mobility, and fall risk. Utilizing quad cane and AFO with household and community ambulation. Pt has goal of increased toe movement/strength, improved balance, ambulation without R AFO, and reduced spasticity. Provided extensive patient education and will continue to educate on realistic expectations due to pt's spasticity and synergy patterns. Demonstrates MDC improvements indicating clinical significant change on 5xSTS, TUG, and 10mWT indicating enhanced functional transfers, mobility, and gait speed. Now able to assess TRIMBLE and 6MWT. She is considered at HIGH risk of falls per 5xSTS, TUG, TRIMBLE, and 10mWT. Pt will benefit from skilled PT interventions for gait training, balance training, neuromuscular reeducation, strengthening, endurance training, and functional task practice to assist pt in improving current level of mobility and increasing QOL. Educated patient extensively on PT implication and limitations, PT POC (NMES for HEP; will bring in  unit in upcoming visits), importance of inc physical activity, need for AFO to reduce foot/toe drag (inc risk of falls), f/u with neurology for spasticity management, and Botox injections. Has met 5/5 STG and is progressing towards completion of LTGs.      Impairments: Abnormal coordination, Abnormal gait, Abnormal muscle tone, Abnormal or restricted ROM, Activity intolerance, Impaired balance, Impaired physical strength, Lacks appropriate HEP, Poor posture, Poor body mechanics, Pain with function, Safety issue, Weight-bearing intolerance, Abnormal movement, Difficulty understanding, Abnormal muscle firing  Understanding of Dx/Px/POC: Good  Prognosis: Good    Patient verbalized understanding of POC.         Please contact me if you have any questions or recommendations. Thank you for the referral and the opportunity to share in Izabela Moeller's care.        Plan  Plan details:   Patient would benefit from: Skilled PT  Planned modality interventions: Biofeedback, Cryotherapy, TENS, Thermotherapy  Planned therapy interventions: Abdominal trunk stabilization, ADL training, Balance, Balance/WB training, Breathing training, Body mechanics training, Coordination, Functional ROM exercises, Gait training, HEP, Joint Mobilization, Manual Therapy, Brock taping, Motor coordination training, Neuromuscular re-education, Patient education, Postural training, Strengthening, Stretching, Therapeutic activities, Therapeutic exercises, Therapeutic training, Transfer training, Activity modification, Work reintegration  Frequency: 1-2x/wk  Duration in weeks: 12 weeks  Plan of Care beginning date: 12/30/2024  Plan of Care expiration date: 3 months - 4/30/2025  Treatment plan discussed with: Patient       Goals  Short Term Goals (4 weeks):    - Patient will improve time on TUG by 2.9 seconds to facilitate improved safety in all ambulation met  - Patient will be independent in basic HEP 2-3 weeks met  - Patient will improve 5xSTS  score by 2.3 seconds to promote improved LE functional strength needed for ADLs met  - Patient will perform TRIMBLE met  - Patient will perform 6MWT met    Long Term Goals (12 weeks):  - Patient will be independent in a comprehensive home exercise program  - Patient will improve scoring on DGI by 2.6 points to progress safety  - Patient will improve gait speed by 0.18 m/s to improve safety with community ambulation  - Patient will improve RTIMBLE by 6 points in order to improve static balance and reduce risk for falls  - Patient will improve 6 Minute Walk Test score by 190 feet to promote improved cardiovascular endurance  - Patient will report 50% reduction in near falls in order to improve safety with functional tasks and reduce his risk for falls  - Patient will report going on walks at least 3 days per week to promote independence and improved cardiovascular endurance  - Patient will be able to ascend/descend stairs reciprocally with 1 UE assist to promote independence and safety with ADLs  - Patient will report 50% reduction in near falls when ambulating on uneven terrain      Cut off score    All date taken from [facility]TA Neuro Section or Rehab Measures      Trimble/56  MDC: 6 pts  Age Norms:  60-69: M - 55   F - 55  70-79: M - 54   F - 53  80-89: M - 53   F - 50 5xSTS: Tod et al 2010  MDC: 2.3 sec  Age Norms:  60-69: 11.4 sec  70-79: 12.6 sec  80-89: 14.8 sec   TUG  MDC: 4.14 sec  Cut off score:  >13.5 sec community dwelling adults  >32.2 frail elderly  <20 I for basic transfers  >30 dependent on transfers 10 Meter Walk Test: Annamaria et al 2011  MDC: 0.18 m/s  20-29: M - 1.35 m   F - 1.34 m  30-39: M - 1.43 m   F - 1.34 m  40-49: M - 1.43 m   F - 1.39 m  50-59: M - 1.43 m   F - 1.31 m  60-69: M - 1.34 m   F - 1.24 m  70-79: M - 1.26 m   F - 1.13 m  80-89: M - 0.97 m   F - 0.94 m    Household Ambulator < 0.4 m/s  Limited Community Ambulator 0.4 - 0.8 m/s  Community Ambulator 0.8 - 1.2 m/s  Safely cross the  street > 1.2 m/s   FGA  MCID: 4 pts  Geriatrics/community < 22/30 fall risk  Geriatrics/community < 20/30 unexplained falls    DGI  MDC: vestibular - 4 pts  MDC: geriatric/community - 3 pts  Falls risk <19/24 mCTSIB  Norm: 20-60 yrs  Eyes open firm: norm sway 0.21-0.48  Eyes closed firm: norm sway 0.48-0.99  Eyes open foam: norm sway 0.38-0.71  Eyes closed foam: norm sway 0.70-2.22   6 Minute Walk Test  MDC: 190.98 ft  MCID: 164 ft    Age Norms  60-69: M - 1876 ft (571.80 m)  F - 1765 ft (537.98 m)  70-79: M - 1729 ft (527.00 m)  F - 1545 ft (470.92 m)  80-89: M - 1368 ft (416.97 m)  F - 1286 ft (391.97 m) ABC: Yves & Soto, 2003  <67% increased risk for falls   [place] Mon[employer]il[employer]  Evaluator Size:        Force (grams):          Hand/Dorsal Thresholds:        Pl[place]ar Thresholds:  - 1.65                       - 0.008                       - Normal                                 - Normal  - 2.36                       - 0.02                         - Normal                                 - Normal  - 2.44                       - 0.04                         - Normal                                 - Normal  - 2.83                       - 0.07                         - Normal                                 - Normal  - 3.22                       - 0.16                         - Diminished light touch          - Normal  - 3.61                       - 0.40                         - Diminished light touch          - Normal  - 3.84                       - 0.60                         - Diminished protective           - Diminished light touch  - 4.08                       - 1.00                         - Diminished protective           - Diminished light touch  - 4.17                       - 1.40                         - Diminished protective           - Diminished light touch  - 4.31                       - 2.00                         - Diminished protective           - Diminished light touch  - 4.56                        - 4.00                         - Loss of protective sense      - Diminished protective  - 4.74                       - 6.00                         - Loss of protective sense      - Diminished protective  - 4.93                       - 8.00                         - Loss of protective sense      - Diminished protective  - 5.07                       - 10.0                         - Loss of protective sense     - Loss of protective sense  - 5.18                       - 15.0                         - Loss of protective sense     - Loss of protective sense  - 5.46                       - 26.0                         - Loss of protective sense     - Loss of protective sense  - 5.88                       - 60.0                         - Loss of protective sense     - Loss of protective sense  - 6.10                       - 100                          - Loss of protective sense     - Loss of protective sense  - 6.45                       - 180                          - Loss of protective sense     - Loss of protective sense  - 6.65                       - 300                          - Deep pressure sense only  - Deep pressure sense only       Subjective    History of Present Illness  - Mechanism of injury: Meningioma s/p L frontoparietal craniotomy (2020) resulting in residual R-sided heaviness, weakness (LE > UE), numbness, and tingling. Patient arrives with primary complaint of lack of toe movement (absent for past year) and excessive supination. Shares it has worsened in recent months.     Update 1/30/25: Denies falls or changes in past month.     - Primary AD: narrow base QC  - Assist level at home: mod IND (needs assistance with R AFO don)  - Decreased fine motor tasks: No    Patient goal:   Get the toes moving again  Reduce inversion/supination  Reduce heaviness in R LE    Pain  - Location: R-sided LBP  - Aggravating factors: rotation    Social Support  - Steps to enter house: 1 VIOLET  - Stairs in  house: 13 with R HR, inc performance time  - Lives in: [facility]  - Lives with:  and son    - Employment status: retired retail sales  - Hand dominance: R-handed    Treatments  - Previous treatment: outpatient PT  - Current treatment: neurology  - Diagnostic Testing: see chart      Objective     LE MMT  - R Hip Flexion: 3+/5   L Hip Flexion: 4-/5  - R Hip Extension: 3+/5  L Hip Extension: 4-/5  - R Hip Abduction: 4-/5  L Hip Abduction: 4-/5  - R Hip Adduction: 4-/5  L Hip Adduction: 4-/5  - R Knee Extension: 4+/5  L Knee Extension: 4+/5  - R Knee Flexion: 3+/5  L Knee Flexion: 4+/5  - R Ankle DF: 0/5   L Ankle DF: 4+/5  - R Ankle PF: 2/5   L Ankle PF: 4+/5    Sensation  - Light touch: wfl  - Deep pressure: wfl    Coordination  - Heel to Shin: defer  - Alternate Toe Taps: defer  - Finger to Nose: slightly dysmetric on R, improved accuracy with reduced speed    Reflexes/Clonus  - Clonus: No    Modified Marlo   - R knee extension: 2 - easily moved, but more marked tone throughout most ROM   - L knee extension: 0 - no increase in tone  - R knee flexion: 1 - catch and release or minimum resistance at end range    - L knee flexion: 0 - no increase in tone  - R ankle DF: 1 - catch and release or minimum resistance at end range     - L ankle DF: 0 - no increase in tone  - R ankle inversion: 1 - catch and release or minimum resistance at end range    - L ankle inversion: 0 - no increase in tone  - R ankle eversion: 1+ - catch followed by minimum resistance throughout remainder (< half ROM)  - L ankle eversion: 0 - no increase in tone           Outcome Measures Initial Eval  12/30 1/30       5xSTS 16.95 sec no UE, 5/7 reps (2 failed due to insufficient ant WS and poor R foot placement) 11.30s no UE assist, continued difficulty with anterior WS       TUG  - Regular     19.98 sec with QC     15.77s with QC       10 meter 0.39 m/s with QC 0.53 m/s with QC       TRIMBLE NV 36/56       FGA defer defer       DGI defer defer        mCTSIB  - FTEO (firm)  - FTEC (firm)  - FTEO (foam)  - FTEC (foam)   defer   defer       6MWT  ft       ABC 45% 43.13%                       NMR  STS no UE support 2x12 v.c. for RLE placement - YMB   L HHA amb 5# aw on RLE and 7.5# aw on ', vc for inc stride length on L  TM amb 5% incline at 0.9 mph    Precautions: epilepsy, hx meningioma, arthritis, pneumonia     Past Medical History:   Diagnosis Date    Arthritis     L shoulder    Brain compression (HCC) 9/11/2020    Hypercholesteremia     Pneumonia

## 2025-02-04 ENCOUNTER — OFFICE VISIT (OUTPATIENT)
Age: 75
End: 2025-02-04
Payer: MEDICARE

## 2025-02-04 DIAGNOSIS — R29.898 RIGHT ARM WEAKNESS: ICD-10-CM

## 2025-02-04 DIAGNOSIS — D32.9 BENIGN MENINGIOMA (HCC): Primary | ICD-10-CM

## 2025-02-04 DIAGNOSIS — M21.371 RIGHT FOOT DROP: ICD-10-CM

## 2025-02-04 PROCEDURE — 97530 THERAPEUTIC ACTIVITIES: CPT

## 2025-02-04 PROCEDURE — 97112 NEUROMUSCULAR REEDUCATION: CPT

## 2025-02-04 NOTE — PROGRESS NOTES
"Daily Note     Today's date: 2025  Patient name: Izabela Moeller  : 1950  MRN: [id number]  Referring provider: Marcie Parson PA-C  Dx:   Encounter Diagnosis     ICD-10-CM    1. Benign meningioma (HCC)  D32.9       2. Right foot drop  M21.371       3. Right arm weakness  R29.898                        Subjective: Patient reports feeling good. Nothing new. Foot keeps turning in      Objective: See treatment diary below    NMR in // bars:  STS no UE support 2x12 v.c. for RLE placement -  with, 3s iso, 1UE support 3 laps  Mod tandem amb 1UE support 2 laps  Side stepping GTB 3 laps   Lateral step ups 4\" cue for glute med 2 x 10/ea  Side stepping foam beam 3 laps   Step tap onto 6\" stool 2x10     TA  On/off of upright bike with demo of strapping in RLE with theraband- family education      Assessment: Tolerated treatment well. Continued per primary PT POC. Patient participated in skilled PT session focused on balance, gait, and endurance. Family education included demo on/off of upright bike with RLE support with theraband and with  present.  Patient demonstrated fatigue post treatment    Would like to improve endurance; trial ending sessions on TM with incline due to deficits noted at home.      Plan: Continue per plan of care.        POC expires Unit limit Auth Expiration date PT/OT + Visit Limit? Co-Insurance   3/30/25 N/a pend BOMN Yes                               Visit/Unit Tracking   2/4                                           "

## 2025-02-06 ENCOUNTER — OFFICE VISIT (OUTPATIENT)
Age: 75
End: 2025-02-06
Payer: MEDICARE

## 2025-02-06 DIAGNOSIS — D32.9 BENIGN MENINGIOMA (HCC): Primary | ICD-10-CM

## 2025-02-06 DIAGNOSIS — M21.371 RIGHT FOOT DROP: ICD-10-CM

## 2025-02-06 PROCEDURE — 97112 NEUROMUSCULAR REEDUCATION: CPT | Performed by: PHYSICAL THERAPIST

## 2025-02-06 NOTE — PROGRESS NOTES
Daily Note     Today's date: 2025  Patient name: Izabela Moeller  : 1950  MRN: [id number]  Referring provider: Marcie Parson PA-C  Dx:   Encounter Diagnosis     ICD-10-CM    1. Benign meningioma (HCC)  D32.9       2. Right foot drop  M21.371                        Subjective: Patient reports feeling good. Nothing new. Foot keeps turning in      Objective: See treatment diary below    NMR in // bars:  Nu-step: lvl 5 for 5 minutes focus on speed to see if it reduces tone.   STS no UE support 2 x 10 left foot on green foam pad with right ankle mob during sit to stand   March with, 3s iso, 1UE support 3 laps  Lateral hip swing over foam pad vertical direction to simulate clearance needed to mount a horse 20 reps   Gait with metronome at 85bpm.       Assessment: Tolerated treatment well with improvement in gait pattern post Nu step and ankle joint mobs. Use of metronome to increase gait speed with ability to hit 85 bpm per step.  .  Patient demonstrated fatigue post treatment      Plan: Continue per plan of care.        POC expires Unit limit Auth Expiration date PT/OT + Visit Limit? Co-Insurance   3/30/25 N/a pend BOMN Yes                               Visit/Unit Tracking

## 2025-02-11 ENCOUNTER — OFFICE VISIT (OUTPATIENT)
Age: 75
End: 2025-02-11
Payer: MEDICARE

## 2025-02-11 DIAGNOSIS — R29.898 RIGHT ARM WEAKNESS: ICD-10-CM

## 2025-02-11 DIAGNOSIS — M21.371 RIGHT FOOT DROP: ICD-10-CM

## 2025-02-11 DIAGNOSIS — D32.9 BENIGN MENINGIOMA (HCC): Primary | ICD-10-CM

## 2025-02-11 PROCEDURE — 97112 NEUROMUSCULAR REEDUCATION: CPT

## 2025-02-11 NOTE — PROGRESS NOTES
"Daily Note     Today's date: 2025  Patient name: Izabela Moeller  : 1950  MRN: [id number]  Referring provider: Marcie Parson PA-C  Dx:   Encounter Diagnosis     ICD-10-CM    1. Benign meningioma (HCC)  D32.9       2. Right foot drop  M21.371       3. Right arm weakness  R29.898                        Subjective: Patient reports feeling good. Nothing new. Foot keeps turning in      Objective: See treatment diary below    NMR in // bars:  Nu-step: lvl 5 for 5 minutes focus on speed to see if it reduces tone.   STS no UE support 2 x 10   March with, 3s iso, 1UE support 3 laps  Mod tandem amb with 1UE assist 3 laps  Lateral step ups 4\" cue for glute med 2 x 10/ea  Side stepping foam beam 3 laps   Step tap onto 6\" stool 2x10   Hurdles 6 inches 4 laps ea fwd and lateral       Assessment: Tolerated treatment well. Patient experienced no loss of balance. Patient require minimal rest breaks during session. Patient did have difficulty with hurdles clearing only 75% of the time. Continue to progress .  Patient demonstrated fatigue post treatment      Plan: Continue per plan of care.        POC expires Unit limit Auth Expiration date PT/OT + Visit Limit? Co-Insurance   3/30/25 N/a pend BOMN Yes                               Visit/Unit Tracking   1 1 2/ 2/                                         "

## 2025-02-13 ENCOUNTER — OFFICE VISIT (OUTPATIENT)
Age: 75
End: 2025-02-13
Payer: MEDICARE

## 2025-02-13 DIAGNOSIS — M21.371 RIGHT FOOT DROP: ICD-10-CM

## 2025-02-13 DIAGNOSIS — D32.9 BENIGN MENINGIOMA (HCC): Primary | ICD-10-CM

## 2025-02-13 PROCEDURE — 97112 NEUROMUSCULAR REEDUCATION: CPT | Performed by: PHYSICAL THERAPIST

## 2025-02-13 NOTE — PROGRESS NOTES
"Daily Note     Today's date: 2025  Patient name: Izabela Moeller  : 1950  MRN: [id number]  Referring provider: Marcie Parson PA-C  Dx:   Encounter Diagnosis     ICD-10-CM    1. Benign meningioma (HCC)  D32.9       2. Right foot drop  M21.371                        Subjective: Patient reports feeling good. Nothing new. Foot keeps turning in      Objective: See treatment diary below    NMR in // bars:  Nu-step: lvl 5 for 5 minutes focus on speed to see if it reduces tone.   Left ankle mob during STS no UE support 2 x 10   March with, 3s iso, 1UE support 4 laps  Hurdles 6 inches 4 laps ea fwd and lateral   W/C pushes 150 feet x 2 laps with manual resistance from therapist   Forced walking 150 feet x 2 laps with therapist assistance to speed patient up   Lateral step ups 4\" cue for glute med 2 x 10/ea  Step tap onto 6\" stool 2x10   Mod tandem amb with 1UE assist 3 laps    Assessment: Tolerated treatment well. Pt with improvement in gait pattern post Nu step and ankle joint mobs. Use of metronome and forced walking with improvement in foot turn over rate.  Patient experienced no loss of balance. Patient require minimal rest breaks during session. Patient did have difficulty with hurdles clearing only 75% of the time. Continue to progress .  Patient demonstrated fatigue post treatment      Plan: Continue per plan of care.        POC expires Unit limit Auth Expiration date PT/OT + Visit Limit? Co-Insurance   3/30/25 N/a pend BOMN Yes                               Visit/Unit Tracking  - PN                                         "

## 2025-02-17 ENCOUNTER — TELEPHONE (OUTPATIENT)
Dept: NEUROLOGY | Facility: CLINIC | Age: 75
End: 2025-02-17

## 2025-02-17 NOTE — TELEPHONE ENCOUNTER
Called pt to confirm appt for 2/26/25. Pt did not answer, but voicemail was left. Will send a reminder in mail.

## 2025-02-18 ENCOUNTER — OFFICE VISIT (OUTPATIENT)
Age: 75
End: 2025-02-18
Payer: MEDICARE

## 2025-02-18 DIAGNOSIS — D32.9 BENIGN MENINGIOMA (HCC): Primary | ICD-10-CM

## 2025-02-18 DIAGNOSIS — R29.898 RIGHT ARM WEAKNESS: ICD-10-CM

## 2025-02-18 DIAGNOSIS — M21.371 RIGHT FOOT DROP: ICD-10-CM

## 2025-02-18 PROCEDURE — 97112 NEUROMUSCULAR REEDUCATION: CPT

## 2025-02-18 NOTE — PROGRESS NOTES
"Daily Note     Today's date: 2025  Patient name: Izabela Moeller  : 1950  MRN: [id number]  Referring provider: Marcie Parson PA-C  Dx:   Encounter Diagnosis     ICD-10-CM    1. Benign meningioma (HCC)  D32.9       2. Right foot drop  M21.371       3. Right arm weakness  R29.898                      Subjective: Patient reports feeling good. Nothing new.      Objective: See treatment diary below    NMR in // bars:  Nu-step: lvl 5 for 5 minutes focus on speed to see if it reduces tone  March with, 3s iso, 1UE support 4 laps  Left ankle mob during STS no UE support 2 x 10   Green foam under LLE, PT OP to promote inc DF  Mod tandem amb with 1UE assist 3 laps  Lateral step ups 4\" cue for glute med 2 x 10/ea  Side step with G TB resistance at proximal knees 3 laps  Hurdles 6 inches 4 laps ea fwd and lateral     NOT TODAY -  W/C pushes 150 feet x 2 laps with manual resistance from therapist   Forced walking 150 feet x 2 laps with therapist assistance to speed patient up   Step tap onto 6\" stool 2x10     Assessment: Tolerated treatment well. Pt with improvement in gait pattern post Nu step and ankle joint mobs. In previous session, use of metronome and forced walking with improvement in foot turn over rate.  Patient experienced no loss of balance. Patient require minimal rest breaks during session. Patient did have difficulty with hurdles clearing only 75% of the time. Continue to progress .  Patient demonstrated fatigue post treatment      Plan: Continue per plan of care.        POC expires Unit limit Auth Expiration date PT/OT + Visit Limit? Co-Insurance   3/30/25 N/a pend BOMN Yes                               Visit/Unit Tracking  - PN                                      "

## 2025-02-20 ENCOUNTER — OFFICE VISIT (OUTPATIENT)
Age: 75
End: 2025-02-20
Payer: MEDICARE

## 2025-02-20 DIAGNOSIS — D32.9 BENIGN MENINGIOMA (HCC): Primary | ICD-10-CM

## 2025-02-20 DIAGNOSIS — R29.898 RIGHT ARM WEAKNESS: ICD-10-CM

## 2025-02-20 DIAGNOSIS — M21.371 RIGHT FOOT DROP: ICD-10-CM

## 2025-02-20 PROCEDURE — 97112 NEUROMUSCULAR REEDUCATION: CPT

## 2025-02-20 NOTE — PROGRESS NOTES
Daily Note     Today's date: 2025  Patient name: zIabela Moeller  : 1950  MRN: [id number]  Referring provider: Marcie Parson PA-C  Dx:   Encounter Diagnosis     ICD-10-CM    1. Benign meningioma (HCC)  D32.9       2. Right foot drop  M21.371       3. Right arm weakness  R29.898         Start Time: 1630  Stop Time: 1715  Total time in clinic (min): 45 minutes    Subjective: Patient reports feeling good.      Objective: See treatment diary below    NMR in // bars:  - Nu-step: lvl 5 for 5 minutes focus on speed  - Resisted walking with red band, 4# on LLE, 5# on RLE  - March with, 3s iso, 1UE support 4 laps, 2.5# ankle weight on toes of R foot  - Mod tandem amb with 1UE assist 3 laps  - Retro amb 4 laps, 2 UE support  - Sit to stand 2x10  - Lunges while walking 4 laps       Assessment: No losses of balance, but she is unstable with single leg stance activities on the RLE. R ankle DF is limited, which alters gait mechanics and increases energy expenditure. Continue to progress as safe and able. She can continue to benefit from skilled PT to maximize her function.      Plan: Continue per plan of care.        POC expires Unit limit Auth Expiration date PT/OT + Visit Limit? Co-Insurance   3/30/25 N/a pend BOMN Yes                               Visit/Unit Tracking  - PN

## 2025-02-25 ENCOUNTER — OFFICE VISIT (OUTPATIENT)
Age: 75
End: 2025-02-25
Payer: MEDICARE

## 2025-02-25 DIAGNOSIS — M21.371 RIGHT FOOT DROP: ICD-10-CM

## 2025-02-25 DIAGNOSIS — D32.9 BENIGN MENINGIOMA (HCC): Primary | ICD-10-CM

## 2025-02-25 PROCEDURE — 97112 NEUROMUSCULAR REEDUCATION: CPT

## 2025-02-25 NOTE — PROGRESS NOTES
Daily Note     Today's date: 2025  Patient name: Izabela Moeller  : 1950  MRN: [id number]  Referring provider: Marcie Parson PA-C  Dx:   Encounter Diagnosis     ICD-10-CM    1. Benign meningioma (HCC)  D32.9       2. Right foot drop  M21.371         Start Time: 1545  Stop Time: 1625  Total time in clinic (min): 40 minutes    Subjective: Patient reports feeling good. Went to see monster trucks over the weekend.       Objective: See treatment diary below    NMR in // bars:  - Nu-step: lvl 5 for 5 minutes focus on speed  - Resisted walking with red band, 4# on LLE, 5# on RLE  - March with, 3s iso, 1UE support 4 laps, 2.5# ankle weight on toes of R foot  - Mod tandem amb with 1UE assist 4 laps  - Retro amb 4 laps, 2 UE support  - Sit to stand 2x10  - Lunges while walking 4 laps    Assessment: No losses of balance, but she continues to be unstable with single leg stance activities on the RLE. R ankle DF continues to be  limited, alters gait mechanics and increases energy expenditure leading to decreased activity tolerance overall. Patient does seem to have balance improvements. Mild increase in volume to achieve progressive overload. Continue to progress as safe and able. She can continue to benefit from skilled PT to maximize her function.      Plan: Continue per plan of care.  RE next visit?        POC expires Unit limit Auth Expiration date PT/OT + Visit Limit? Co-Insurance   3/30/25 N/a pend BOMN Yes                               Visit/Unit Tracking  - PN

## 2025-02-26 ENCOUNTER — OFFICE VISIT (OUTPATIENT)
Dept: NEUROLOGY | Facility: CLINIC | Age: 75
End: 2025-02-26
Payer: MEDICARE

## 2025-02-26 VITALS
HEART RATE: 68 BPM | WEIGHT: 166 LBS | DIASTOLIC BLOOD PRESSURE: 74 MMHG | SYSTOLIC BLOOD PRESSURE: 110 MMHG | OXYGEN SATURATION: 98 % | BODY MASS INDEX: 27.66 KG/M2 | HEIGHT: 65 IN

## 2025-02-26 DIAGNOSIS — G81.91 RIGHT HEMIPARESIS (HCC): Primary | ICD-10-CM

## 2025-02-26 DIAGNOSIS — D32.9 BENIGN MENINGIOMA (HCC): ICD-10-CM

## 2025-02-26 DIAGNOSIS — G40.109 PARTIAL SYMPTOMATIC EPILEPSY WITH SIMPLE PARTIAL SEIZURES, NOT INTRACTABLE, WITHOUT STATUS EPILEPTICUS (HCC): ICD-10-CM

## 2025-02-26 PROCEDURE — 99214 OFFICE O/P EST MOD 30 MIN: CPT | Performed by: STUDENT IN AN ORGANIZED HEALTH CARE EDUCATION/TRAINING PROGRAM

## 2025-02-26 PROCEDURE — G2211 COMPLEX E/M VISIT ADD ON: HCPCS | Performed by: STUDENT IN AN ORGANIZED HEALTH CARE EDUCATION/TRAINING PROGRAM

## 2025-02-26 NOTE — PROGRESS NOTES
"Epilepsy Ambulatory Visit  Name: Izabela Moeller       : 1950       MRN: [id number]   Encounter Provider: Elvia Watkins MD   Encounter Date: 2025  Encounter department: [facility]    Izabela Moeller is a right-handed 74 y.o. female with PMH of left frontoparietal meningioma s/p resection (), c/b right hemiparesis and epilepsy who presents for follow-up. Regarding her epilepsy, she continues to have episodes of \"mild\" right hemibody twitching and right leg stiffening, occurring at night once a month, with preserved awareness, concerning for focal seizure. However, she is not interested in restarting seizure medication due to side effects.     She has spasticity in the RLE with inversion of her foot which is causing pain when she wears her foot brace. We discussed again my recommendation for botox with PM&R and she was willing to try this. She did not consistently try the baclofen, she will try this again.     4-Dimensional Classification  Epileptic Paroxysmal Episode   Semiology: 1. R hemibody clonic 2. RLE tonic   Epileptogenic Zone: Left frontoparietal  Etiology: Left frontoparietal meningioma  Comorbidities: R hemiparesis  Current Epilepsy Medications: none  Prior Epilepsy Medications: levetiracetam 500-500      Assessment & Plan  Right hemiparesis (HCC)  With right foot contracture   - continue physical therapy  - referral to PM&R already placed, for spasticity management  - start baclofen 5mg BID (order already placed)  - continue physical therapy-> she has a referral to get a new foot brace       Partial symptomatic epilepsy with simple partial seizures, not intractable, without status epilepticus (HCC)  - does not want to take AEDs  - monitor clinically   - continue seizure precautions       Benign meningioma (HCC)  - surveillance per neurosurgery          RTC 6 mo      INTERVAL HISTORY  She continues to have episodes of right leg stretching and " twitching, only at night. It does not bother her. We discussed that this may be a seizure but she does not want to treat this.     She has been going to physical therapy which has been helping. She is still getting pain in her foot and around her brace, which she thinks is mainly related to the inversion of her foot. She was told by physical therapy that she needs a new brace and she was referred to get a fitting for a new one. Her foot continues to turn inwards. She has minimal movement of her foot.     She has not yet called to get an appointment with PM&R, but she is more willing to try Botox. She says she didn't call because she was thinking of moving to [place].       Prior Work-up:   MRI Brain: Personally reviewed by me, and shows: area of gliosis and encephalomalacia adjacent to the left central sulcus.   11/12/24: IMPRESSION:  Stable exam. Status post left frontal parietal vertex meningioma resection without evidence of residual recurrent disease.  Mild chronic microangiopathic changes.  REEGs (1/29/21):   This is a normal routine EEG. If a seizure disorder is considered clinically a repeat tracing with sleep deprivation may be of additional diagnostic value     Epilepsy Risk Factors: Patient is a product of uncomplicated full term pregnancy, met appropriate development milestones. No learning disabilities or cognitive delay. No h/o febrile seizures, CNS infections, or strokes.  There is no family h/o of seizures or epilepsy.     Seizure History:  In 2020, she began to have tingling and numbness in her right toes, which started to progress to the whole right hemibody up to her neck. She also began to have episodes of twitching involving the whole right side, starting in her leg. She was eventually diagnosed with a meningioma which was resected. After surgery, she could barely move the right hemibody and had slurred speech. All of this has significantly improved.     She continues to have right  "hemibody weakness, mostly in the left, and wears a leg brace. She was previously in physical therapy but stopped last year. She uses a cane to ambulate. She continues to have tingling in the right hemibody that is bothersome to her, as well as a throbbing and tightness and heaviness feeling.      She continues to have episodes of \"light\" twitching involving the whole right side, about once a month. She also has episodes where the right leg stretches out and cramps. She has never lost awareness.      She was on Keppra, felt like she was losing her hair and getting constipated, so she stopped taking it. She has not noticed a significant different in her twitching or stiffening episodes.      Per witness ():   He would see her leg \"pulsating\" and shaking which would start to increase in frequency. This would last 5 minutes and eventually progress to her arm, but not her face or head.    Past Medical History:    Past Medical History:   Diagnosis Date    Arthritis     L shoulder    Brain compression (HCC) 2020    Hypercholesteremia     Pneumonia      Past Surgical History:   Procedure Laterality Date     SECTION      IA CRNEC TREPH BONE FLAP CRNOT EXC MENINGIOMA STTL Left 10/15/2020    Procedure: Image guided left frontoparietal craniotomy for tumor;  Surgeon: Nick Diaz MD;  Location: [facility];  Service: Neurosurgery    TUBAL LIGATION         Family History:  Family History   Problem Relation Age of Onset    No Known Problems Mother     No Known Problems Sister        Social History:  Social History     Tobacco Use    Smoking status: Never    Smokeless tobacco: Never   Vaping Use    Vaping status: Never Used   Substance Use Topics    Alcohol use: Yes     Comment: special occasions    Drug use: Never        Allergies:  Allergies   Allergen Reactions    Gadolinium GI Intolerance    Gadolinium Derivatives Seizures    Trilipix [Choline Fenofibrate] Other (See Comments)     Pain Started in the " "upper/mid back and radiated into the front (chest area). Pt unaware of this response and is unsure.       Medications:  No current outpatient medications on file.      OBJECTIVE  /74 (BP Location: Left arm, Patient Position: Sitting, Cuff Size: Large)   Pulse 68   Ht 5' 5\" (1.651 m)   Wt 75.3 kg (166 lb)   SpO2 98%   BMI 27.62 kg/m²      Labs  I have reviewed pertinent labs:  CBC:   Lab Results   Component Value Date    WBC 6.94 11/07/2024    RBC 4.60 11/07/2024    HGB 13.6 11/07/2024    HCT 41.7 11/07/2024    MCV 91 11/07/2024     11/07/2024    MCH 29.6 11/07/2024    MCHC 32.6 11/07/2024    RDW 12.4 11/07/2024    MPV 13.3 (H) 11/07/2024    NEUTROABS 4.00 11/05/2020     CMP:   Lab Results   Component Value Date    SODIUM 143 11/07/2024    K 3.8 11/07/2024     (H) 11/07/2024    CO2 27 11/07/2024    AGAP 7 11/07/2024    BUN 15 11/07/2024    CREATININE 0.76 11/07/2024    GLUC 157 (H) 11/07/2024    GLUF 106 (H) 11/09/2023    CALCIUM 9.5 11/07/2024    AST 26 11/07/2024    ALT 30 11/07/2024    ALKPHOS 68 11/07/2024    TP 7.1 11/07/2024    ALB 4.1 11/07/2024    TBILI 0.56 11/07/2024    EGFR 78 11/07/2024       Lab Results   Component Value Date/Time    TSH 2.77 07/14/2020 10:06 AM    QNN8DNJSZWRS 1.675 11/07/2024 10:52 AM    HGBA1C 5.8 (H) 09/29/2020 02:10 PM       Lab Results   Component Value Date/Time    LEVETIRACETA <2.0 (L) 11/07/2024 10:52 AM    LEVETIRACETA 35.4 01/11/2021 12:42 PM         General Exam  GENERAL APPEARANCE:  No distress, alert, interactive and cooperative.  CARDIOVASCULAR: Warm and well perfused  LUNGS: normal work of breathing on room air  EXTREMITIES: no peripheral edema     Neurologic Exam  Mental Status: Alert and oriented to person, place, and time.   Language: Fluent, comprehension intact.  Cranial Nerves: EOMI with no nystagmus. Face is symmetric. No dysarthria. Hearing is intact to conversation.    Motor:  Spasticity in the RLE, with no movement of the right foot. " Right foot is in an inverted position with flexion of big toe.                                       R  Hip extension:             3+  Knee extension:          5  Knee flexion:               4+  Dorsiflexion:                0  Plantarflexion:             0  Gait: Station was unstable with circumduction of right leg. Ambulates with a cane.     Administrative Statements   The total amount of time spent with the patient and on chart review and documentation was 30 minutes. Issues addressed during this visit included counseling on diagnosis and prognosis, counseling on medical management, and counseling on medication side effects.

## 2025-02-27 ENCOUNTER — EVALUATION (OUTPATIENT)
Age: 75
End: 2025-02-27
Payer: MEDICARE

## 2025-02-27 DIAGNOSIS — D32.9 BENIGN MENINGIOMA (HCC): Primary | ICD-10-CM

## 2025-02-27 DIAGNOSIS — R29.898 RIGHT ARM WEAKNESS: ICD-10-CM

## 2025-02-27 DIAGNOSIS — M21.371 RIGHT FOOT DROP: ICD-10-CM

## 2025-02-27 PROCEDURE — 97112 NEUROMUSCULAR REEDUCATION: CPT | Performed by: PHYSICAL THERAPIST

## 2025-02-27 NOTE — PROGRESS NOTES
PT Evaluation / Progress Note         POC expires Unit limit Auth Expiration date PT/OT + Visit Limit? Co-Insurance   3/30/25 N/a pend BOMN Yes                               Visit/Unit Tracking   PN                                              Today's date: 2025  Patient name: Izabela Moeller  : 1950  MRN: [id number]  Referring provider: Marcie Parson PA-C  Dx:   Encounter Diagnosis     ICD-10-CM    1. Benign meningioma (HCC)  D32.9       2. Right foot drop  M21.371       3. Right arm weakness  R29.898             Assessment  Assessment details: Patient is a 74 y.o. Female who presents to skilled outpatient PT with impaired mobility with RLE hemiparesis 2/2 brain tumor removal in . Pt presents with associated impairments, including RLE synergy patterning, abnormal gait pattern, reduced gait speed, reduced LE strength/power, impaired functional mobility, and fall risk. Utilizing quad cane and AFO with household and community ambulation. Pt has goal of increased toe movement/strength, improved balance, ambulation without R AFO, and reduced spasticity. Currently exploring use of medication to reduce tone and marcela of new AFO to help control tone. Show mini change with achieving MDC this date for test and measures. She is considered at HIGH risk of falls per 5xSTS, TUG, TRIMBLE, and 10mWT. Pt will benefit from skilled PT interventions for gait training, balance training, neuromuscular reeducation, strengthening, endurance training, and functional task practice to assist pt in improving current level of mobility and increasing QOL. Educated patient extensively on PT implication and limitations, PT POC (NMES for HEP; will bring in unit in upcoming visits), importance of inc physical activity, need for AFO to reduce foot/toe drag (inc risk of falls), f/u with neurology for spasticity management, and  Botox injections. Has met 5/5 STG and is progressing towards completion of LTGs.      Impairments: Abnormal coordination, Abnormal gait, Abnormal muscle tone, Abnormal or restricted ROM, Activity intolerance, Impaired balance, Impaired physical strength, Lacks appropriate HEP, Poor posture, Poor body mechanics, Pain with function, Safety issue, Weight-bearing intolerance, Abnormal movement, Difficulty understanding, Abnormal muscle firing  Understanding of Dx/Px/POC: Good  Prognosis: Good    Patient verbalized understanding of POC.         Please contact me if you have any questions or recommendations. Thank you for the referral and the opportunity to share in Izabela Sunni's care.        Plan  Plan details:   Patient would benefit from: Skilled PT  Planned modality interventions: Biofeedback, Cryotherapy, TENS, Thermotherapy  Planned therapy interventions: Abdominal trunk stabilization, ADL training, Balance, Balance/WB training, Breathing training, Body mechanics training, Coordination, Functional ROM exercises, Gait training, HEP, Joint Mobilization, Manual Therapy, Brock taping, Motor coordination training, Neuromuscular re-education, Patient education, Postural training, Strengthening, Stretching, Therapeutic activities, Therapeutic exercises, Therapeutic training, Transfer training, Activity modification, Work reintegration  Frequency: 1-2x/wk  Duration in weeks: 12 weeks  Plan of Care beginning date: 12/30/2024  Plan of Care expiration date: 3 months - 3/30/2025  Treatment plan discussed with: Patient       Goals  Short Term Goals (4 weeks):    - Patient will improve time on TUG by 2.9 seconds to facilitate improved safety in all ambulation met  - Patient will be independent in basic HEP 2-3 weeks met  - Patient will improve 5xSTS score by 2.3 seconds to promote improved LE functional strength needed for ADLs met  - Patient will perform TRIMBLE met  - Patient will perform 6MWT met    Long Term Goals  (12 weeks):  - Patient will be independent in a comprehensive home exercise program Progressing   - Patient will improve gait speed by 0.18 m/s to improve safety with community ambulation Progressing   - Patient will improve TRIMBLE by 6 points in order to improve static balance and reduce risk for falls Progressing   - Patient will improve 6 Minute Walk Test score by 190 feet to promote improved cardiovascular endurance Progressing   - Patient will report 50% reduction in near falls in order to improve safety with functional tasks and reduce his risk for falls Progressing   - Patient will report going on walks at least 3 days per week to promote independence and improved cardiovascular endurance Progressing   - Patient will be able to ascend/descend stairs reciprocally with 1 UE assist to promote independence and safety with ADLs Progressing   - Patient will report 50% reduction in near falls when ambulating on uneven terrain Progressing       Cut off score    All date taken from [facility]TA Neuro Section or Rehab Measures      Trimble/56  MDC: 6 pts  Age Norms:  60-69: M - 55   F - 55  70-79: M - 54   F - 53  80-89: M - 53   F - 50 5xSTS: Tod et al 2010  MDC: 2.3 sec  Age Norms:  60-69: 11.4 sec  70-79: 12.6 sec  80-89: 14.8 sec   TUG  MDC: 4.14 sec  Cut off score:  >13.5 sec community dwelling adults  >32.2 frail elderly  <20 I for basic transfers  >30 dependent on transfers 10 Meter Walk Test: Mar al 2011  MDC: 0.18 m/s  20-29: M - 1.35 m   F - 1.34 m  30-39: M - 1.43 m   F - 1.34 m  40-49: M - 1.43 m   F - 1.39 m  50-59: M - 1.43 m   F - 1.31 m  60-69: M - 1.34 m   F - 1.24 m  70-79: M - 1.26 m   F - 1.13 m  80-89: M - 0.97 m   F - 0.94 m    Household Ambulator < 0.4 m/s  Limited Community Ambulator 0.4 - 0.8 m/s  Community Ambulator 0.8 - 1.2 m/s  Safely cross the street > 1.2 m/s   FGA  MCID: 4 pts  Geriatrics/community < 22/30 fall risk  Geriatrics/community < 20/30 unexplained falls    DGI  MDC:  vestibular - 4 pts  MDC: geriatric/community - 3 pts  Falls risk <19/24 mCTSIB  Norm: 20-60 yrs  Eyes open firm: norm sway 0.21-0.48  Eyes closed firm: norm sway 0.48-0.99  Eyes open foam: norm sway 0.38-0.71  Eyes closed foam: norm sway 0.70-2.22   6 Minute Walk Test  MDC: 190.98 ft  MCID: 164 ft    Age Norms  60-69: M - 1876 ft (571.80 m)  F - 1765 ft (537.98 m)  70-79: M - 1729 ft (527.00 m)  F - 1545 ft (470.92 m)  80-89: M - 1368 ft (416.97 m)  F - 1286 ft (391.97 m) ABC: Yves & Soto, 2003  <67% increased risk for falls   Fowler-Laurie Monofilaments  Evaluator Size:        Force (grams):          Hand/Dorsal Thresholds:        Plantar Thresholds:  - 1.65                       - 0.008                       - Normal                                 - Normal  - 2.36                       - 0.02                         - Normal                                 - Normal  - 2.44                       - 0.04                         - Normal                                 - Normal  - 2.83                       - 0.07                         - Normal                                 - Normal  - 3.22                       - 0.16                         - Diminished light touch          - Normal  - 3.61                       - 0.40                         - Diminished light touch          - Normal  - 3.84                       - 0.60                         - Diminished protective           - Diminished light touch  - 4.08                       - 1.00                         - Diminished protective           - Diminished light touch  - 4.17                       - 1.40                         - Diminished protective           - Diminished light touch  - 4.31                       - 2.00                         - Diminished protective           - Diminished light touch  - 4.56                       - 4.00                         - Loss of protective sense      - Diminished protective  - 4.74                       -  6.00                         - Loss of protective sense      - Diminished protective  - 4.93                       - 8.00                         - Loss of protective sense      - Diminished protective  - 5.07                       - 10.0                         - Loss of protective sense     - Loss of protective sense  - 5.18                       - 15.0                         - Loss of protective sense     - Loss of protective sense  - 5.46                       - 26.0                         - Loss of protective sense     - Loss of protective sense  - 5.88                       - 60.0                         - Loss of protective sense     - Loss of protective sense  - 6.10                       - 100                          - Loss of protective sense     - Loss of protective sense  - 6.45                       - 180                          - Loss of protective sense     - Loss of protective sense  - 6.65                       - 300                          - Deep pressure sense only  - Deep pressure sense only       Subjective    History of Present Illness  - Mechanism of injury: Meningioma s/p L frontoparietal craniotomy (2020) resulting in residual R-sided heaviness, weakness (LE > UE), numbness, and tingling. Patient arrives with primary complaint of lack of toe movement (absent for past year) and excessive supination. Shares it has worsened in recent months.     Update 2/27/25: Denies falls or changes in past month. Got a new script for a brace due to issues with current one with noted issues with toes curling still.     - Primary AD: narrow base QC  - Assist level at home: mod IND (needs assistance with R AFO don)  - Decreased fine motor tasks: No    Patient goal:   Get the toes moving again  Reduce inversion/supination  Reduce heaviness in R LE    Pain  - Location: R-sided LBP  - Aggravating factors: rotation    Social Support  - Steps to enter house: 1 VIOLET  - Stairs in house: 13 with R HR, inc  performance time  - Lives in: [facility]  - Lives with:  and son    - Employment status: retired retail sales  - Hand dominance: R-handed    Treatments  - Previous treatment: outpatient PT  - Current treatment: neurology  - Diagnostic Testing: see chart      Objective     LE MMT  - R Hip Flexion: 3+/5   L Hip Flexion: 4-/5  - R Hip Extension: 3+/5  L Hip Extension: 4-/5  - R Hip Abduction: 4-/5  L Hip Abduction: 4-/5  - R Hip Adduction: 4-/5  L Hip Adduction: 4-/5  - R Knee Extension: 4+/5  L Knee Extension: 4+/5  - R Knee Flexion: 3+/5  L Knee Flexion: 4+/5  - R Ankle DF: 0/5   L Ankle DF: 4+/5  - R Ankle PF: 2/5   L Ankle PF: 4+/5    Sensation  - Light touch: wfl  - Deep pressure: wfl    Coordination  - Heel to Shin: defer  - Alternate Toe Taps: defer  - Finger to Nose: slightly dysmetric on R, improved accuracy with reduced speed    Reflexes/Clonus  - Clonus: No    Modified Marlo   - R knee extension: 2 - easily moved, but more marked tone throughout most ROM   - L knee extension: 0 - no increase in tone  - R knee flexion: 1 - catch and release or minimum resistance at end range    - L knee flexion: 0 - no increase in tone  - R ankle DF: 1 - catch and release or minimum resistance at end range     - L ankle DF: 0 - no increase in tone  - R ankle inversion: 1 - catch and release or minimum resistance at end range    - L ankle inversion: 0 - no increase in tone  - R ankle eversion: 1+ - catch followed by minimum resistance throughout remainder (< half ROM) - L ankle eversion: 0 - no increase in tone           Outcome Measures Initial Eval  12/30 1/30 2/27/25      5xSTS 16.95 sec no UE, 5/7 reps (2 failed due to insufficient ant WS and poor R foot placement) 11.30s no UE assist, continued difficulty with anterior WS 10.61s no UE A       TUG  - Regular     19.98 sec with QC     15.77s with QC   16.06 sec       10 meter 0.39 m/s with QC 0.53 m/s with QC .51` m/s w QC      TRIMBLE NV 36/56 37/56      FGA defer defer        DGI defer defer       mCTSIB  - FTEO (firm)  - FTEC (firm)  - FTEO (foam)  - FTEC (foam)   defer   defer   30 sec   30 sec   3 sec   LOB       6MWT  ft 700 ft       ABC 45% 43.13%                       NMR  STS no UE support 2x12 v.c. for RLE placement - YMB   L HHA amb 5# aw on RLE and 7.5# aw on ', vc for inc stride length on L  TM amb 5% incline at 0.9 mph    Precautions: epilepsy, hx meningioma, arthritis, pneumonia     Past Medical History:   Diagnosis Date    Arthritis     L shoulder    Brain compression (HCC) 9/11/2020    Hypercholesteremia     Pneumonia

## 2025-03-04 ENCOUNTER — OFFICE VISIT (OUTPATIENT)
Age: 75
End: 2025-03-04
Payer: MEDICARE

## 2025-03-04 DIAGNOSIS — R29.898 RIGHT ARM WEAKNESS: ICD-10-CM

## 2025-03-04 DIAGNOSIS — D32.9 BENIGN MENINGIOMA (HCC): Primary | ICD-10-CM

## 2025-03-04 DIAGNOSIS — M21.371 RIGHT FOOT DROP: ICD-10-CM

## 2025-03-04 PROCEDURE — 97112 NEUROMUSCULAR REEDUCATION: CPT | Performed by: PHYSICAL THERAPIST

## 2025-03-04 NOTE — PROGRESS NOTES
Daily Note     Today's date: 3/4/2025  Patient name: Izabela Moeller  : 1950  MRN: [id number]  Referring provider: Marcie Parson PA-C  Dx:   Encounter Diagnosis     ICD-10-CM    1. Benign meningioma (HCC)  D32.9       2. Right foot drop  M21.371       3. Right arm weakness  R29.898                    Subjective: Patient reports feeling good. Went to see monster trucks over the weekend.       Objective: See treatment diary below    NMR in // bars:  - Resisted walking with purple band around ankles 3 laps with RW ( 450 feet)   - March with, 3s iso, 1UE support 4 laps, 2.5# ankle weight on toes of R foot                                                    - Mod tandem amb with 1UE assist 4 laps  - Retro amb 4 laps, 2 UE support  - Sit to stand 2x10  - Lunges while walking 4 laps  - high knee  with 1 foot on 6 inch step with base of floor to top of 2nd step ( total height 12 inches)     Assessment: No losses of balance, but she continues to be unstable with single leg stance activities on the RLE. Improvement in right knee flexion with resisted band walking and high knee drivers. Continue to progress as safe and able. She can continue to benefit from skilled PT to maximize her function.      Plan: Continue per plan of care.         POC expires Unit limit Auth Expiration date PT/OT + Visit Limit? Co-Insurance   3/30/25 N/a pend BOMN Yes                               Visit/Unit Tracking  - PN 2/4 2/6 2/11 2/13   2/18 2/20 2/25  2/27 3/4

## 2025-03-06 ENCOUNTER — OFFICE VISIT (OUTPATIENT)
Age: 75
End: 2025-03-06
Payer: MEDICARE

## 2025-03-06 DIAGNOSIS — D32.9 BENIGN MENINGIOMA (HCC): Primary | ICD-10-CM

## 2025-03-06 DIAGNOSIS — M21.371 RIGHT FOOT DROP: ICD-10-CM

## 2025-03-06 DIAGNOSIS — R29.898 RIGHT ARM WEAKNESS: ICD-10-CM

## 2025-03-06 PROCEDURE — 97112 NEUROMUSCULAR REEDUCATION: CPT

## 2025-03-06 NOTE — PROGRESS NOTES
Daily Note     Today's date: 3/6/2025  Patient name: Izabela Moeller  : 1950  MRN: [id number]  Referring provider: Marcie Parson PA-C  Dx:   Encounter Diagnosis     ICD-10-CM    1. Benign meningioma (HCC)  D32.9       2. Right foot drop  M21.371       3. Right arm weakness  R29.898           Start Time: 1630  Stop Time: 1715  Total time in clinic (min): 45 minutes    Subjective: Patient reports that she wore her ankle brace today for Abran to take a look at.      Objective: See treatment diary below    NMR in // bars:  - Resisted walking with purple band around ankles 3 laps with RW ( 450 feet) Not today  - March with, 3s iso, 1UE support 4 laps, 2.5# ankle weight on RLE                                                    - Mod tandem amb with 1UE assist 4 laps  - Retro amb 4 laps, 1 UE support  - Sit to stand 2x10 w/R ankle brace and stabilizing ankle. LLE on 1 green pad x10 2 green pads x10.  - Lunges while walking 4 laps  - high knee  with 1 foot on 6 inch step with base of floor to top of 2nd step ( total height 12 inches)   - 2# B/L AW ambulation around clinic x 3 full laps w/cane    Assessment: Tolerated treatment well.   Patient participated in skilled PT session focused on balance, gait, and endurance.   Patient continues with decreased R ankle DF causing for compensation during activities.  Continues to demonstrate decreased R ankle stability with SLS activities.   Patient would continue to benefit from skilled PT interventions to address deficits with balance, gait, and endurance. Patient demonstrated fatigue post treatment      Plan: Continue per plan of care.        POC expires Unit limit Auth Expiration date PT/OT + Visit Limit? Co-Insurance   3/30/25 N/a pend BOMN Yes                               Visit/Unit Tracking  - PN 2/4 2/6 2/11 2/13   2/18 2/20 2/25  2/27 3/4 3/6

## 2025-03-11 ENCOUNTER — OFFICE VISIT (OUTPATIENT)
Age: 75
End: 2025-03-11
Payer: MEDICARE

## 2025-03-11 DIAGNOSIS — M21.371 RIGHT FOOT DROP: ICD-10-CM

## 2025-03-11 DIAGNOSIS — D32.9 BENIGN MENINGIOMA (HCC): Primary | ICD-10-CM

## 2025-03-11 DIAGNOSIS — R29.898 RIGHT ARM WEAKNESS: ICD-10-CM

## 2025-03-11 PROCEDURE — 97112 NEUROMUSCULAR REEDUCATION: CPT

## 2025-03-11 NOTE — PROGRESS NOTES
Daily Note     Today's date: 3/11/2025  Patient name: Izabela Moeller  : 1950  MRN: [id number]  Referring provider: Marcie Parson PA-C  Dx:   Encounter Diagnosis     ICD-10-CM    1. Benign meningioma (HCC)  D32.9       2. Right foot drop  M21.371       3. Right arm weakness  R29.898             Start Time: 1630  Stop Time: 1715  Total time in clinic (min): 45 minutes    Subjective: Patient reports that she is doing well. Brace appointment is on Monday.        Objective: See treatment diary below    NMR in // bars:  - Resisted walking with purple band around ankles 3 laps with RW ( 450 feet) Not today  - March with, 3s iso, 1UE support 4 laps, 3# ankle weight BLE   - Mod tandem amb with 1UE assist 4 laps  - Retro amb 4 laps, 1 UE support  - Sit to stand 2x10 w/R ankle brace and stabilizing ankle. LLE on 1 green pad x20.  - Lunges while walking 4 laps  - high knee  with 1 foot on 6 inch step with base of floor to top of 2nd step ( total height 12 inches)   - 2# B/L AW ambulation around clinic x 3 full laps w/cane  - 0.9 5% incline x10 mins     Assessment: Tolerated treatment well.   Patient participated in skilled PT session focused on balance, gait, and endurance.   Patient continues with decreased R ankle DF causing for compensation during activities, circumduction. Good endurance while on treadmill for 10 minutes.   Continues to demonstrate decreased R ankle stability with SLS activities.   Patient would continue to benefit from skilled PT interventions to address deficits with balance, gait, and endurance. Patient demonstrated fatigue post treatment      Plan: Continue per plan of care.        POC expires Unit limit Auth Expiration date PT/OT + Visit Limit? Co-Insurance   3/30/25 N/a pend BOMN Yes                               Visit/Unit Tracking  - PN 2/4 2/6 2/11 2/13   2/18 2/20 2/25  2/27 3/4 3/6 3/11

## 2025-03-13 ENCOUNTER — OFFICE VISIT (OUTPATIENT)
Age: 75
End: 2025-03-13
Payer: MEDICARE

## 2025-03-13 DIAGNOSIS — D32.9 BENIGN MENINGIOMA (HCC): Primary | ICD-10-CM

## 2025-03-13 DIAGNOSIS — M21.371 RIGHT FOOT DROP: ICD-10-CM

## 2025-03-13 DIAGNOSIS — R29.898 RIGHT ARM WEAKNESS: ICD-10-CM

## 2025-03-13 PROCEDURE — 97112 NEUROMUSCULAR REEDUCATION: CPT

## 2025-03-13 NOTE — PROGRESS NOTES
Daily Note     Today's date: 3/13/2025  Patient name: Izabela Moeller  : 1950  MRN: [id number]  Referring provider: Marcie Parson PA-C  Dx:   Encounter Diagnosis     ICD-10-CM    1. Benign meningioma (HCC)  D32.9       2. Right foot drop  M21.371       3. Right arm weakness  R29.898                        Subjective: Patient reports that she is doing well. Brace appointment is on Monday.        Objective: See treatment diary below    NMR in // bars:  - Resisted walking with purple band around ankles 3 laps with RW ( 450 feet) Not today  - March with, 3s iso, 1UE support 4 laps, 3# ankle weight BLE   - Mod tandem amb with 1UE assist 4 laps  - Retro amb 4 laps, 1 UE support  - Sit to stand 2x10 w/R ankle brace and stabilizing ankle. LLE on 1 green pad x20.  - Lunges while walking 4 laps  - high knee  with 1 foot on 6 inch step with base of floor to top of 2nd step ( total height 12 inches)   - 2# B/L AW ambulation around clinic x 3 full laps w/cane  - 0.9 5% incline x10 mins- deferred  -nustep 10 minutes     Assessment: Tolerated treatment well.   Patient participated in skilled PT session focused on balance, gait, and endurance.   Patient continues with decreased R ankle DF causing for compensation during activities. Patient deferred the treadmill today, but wanted to ride the nustep. Patient experienced no loss fo balance during exercises. Continue to progress. Patient would continue to benefit from skilled PT interventions to address deficits with balance, gait, and endurance. Patient demonstrated fatigue post treatment      Plan: Continue per plan of care.        POC expires Unit limit Auth Expiration date PT/OT + Visit Limit? Co-Insurance   3/30/25 N/a pend BOMN Yes                               Visit/Unit Tracking  - PN  3/4 3/6 3/11 3/13

## 2025-03-18 ENCOUNTER — OFFICE VISIT (OUTPATIENT)
Age: 75
End: 2025-03-18
Payer: MEDICARE

## 2025-03-18 DIAGNOSIS — D32.9 BENIGN MENINGIOMA (HCC): Primary | ICD-10-CM

## 2025-03-18 DIAGNOSIS — M21.371 RIGHT FOOT DROP: ICD-10-CM

## 2025-03-18 PROCEDURE — 97112 NEUROMUSCULAR REEDUCATION: CPT | Performed by: PHYSICAL THERAPIST

## 2025-03-18 NOTE — PROGRESS NOTES
Daily Note     Today's date: 3/18/2025  Patient name: Izabela Moeller  : 1950  MRN: [id number]  Referring provider: Marcie Parson PA-C  Dx:   Encounter Diagnosis     ICD-10-CM    1. Benign meningioma (HCC)  D32.9       2. Right foot drop  M21.371                        Subjective: Patient reports that she is doing well. Brace appointment is on Monday.        Objective: See treatment diary below    NMR in // bars:  - Resisted walking with purple band around ankles 3 laps with RW ( 450 feet)   - March with, 3s iso, 1UE support 4 laps, 3# ankle weight BLE   - Mod tandem amb with 1UE assist 4 laps  - Retro amb 4 laps, 1 UE support  - high knee  with 1 foot on 6 inch step with base of floor to top of 2nd step ( total height 12 inches)   - 2# B/L AW ambulation around clinic x 3 full laps w/cane  - 0.9 5% incline x5 mins-       Assessment: Tolerated treatment well.   Patient participated in skilled PT session focused on balance, gait, and endurance.   Patient continues with decreased R ankle DF causing for compensation during activities.  Patient experienced no loss fo balance during exercises. Continue to progress. Patient would continue to benefit from skilled PT interventions to address deficits with balance, gait, and endurance. Patient demonstrated fatigue post treatment      Plan: Continue per plan of care.        POC expires Unit limit Auth Expiration date PT/OT + Visit Limit? Co-Insurance   3/30/25 N/a pend BOMN Yes                               Visit/Unit Tracking  - PN  3/4 3/6 3/11 3/13 3/18

## 2025-03-20 ENCOUNTER — OFFICE VISIT (OUTPATIENT)
Age: 75
End: 2025-03-20
Payer: MEDICARE

## 2025-03-20 DIAGNOSIS — M21.371 RIGHT FOOT DROP: ICD-10-CM

## 2025-03-20 DIAGNOSIS — D32.9 BENIGN MENINGIOMA (HCC): Primary | ICD-10-CM

## 2025-03-20 DIAGNOSIS — R29.898 RIGHT ARM WEAKNESS: ICD-10-CM

## 2025-03-20 PROCEDURE — 97112 NEUROMUSCULAR REEDUCATION: CPT | Performed by: PHYSICAL THERAPIST

## 2025-03-25 ENCOUNTER — OFFICE VISIT (OUTPATIENT)
Age: 75
End: 2025-03-25
Payer: MEDICARE

## 2025-03-25 DIAGNOSIS — R29.898 RIGHT ARM WEAKNESS: ICD-10-CM

## 2025-03-25 DIAGNOSIS — M21.371 RIGHT FOOT DROP: ICD-10-CM

## 2025-03-25 DIAGNOSIS — D32.9 BENIGN MENINGIOMA (HCC): Primary | ICD-10-CM

## 2025-03-25 PROCEDURE — 97112 NEUROMUSCULAR REEDUCATION: CPT

## 2025-03-25 NOTE — PROGRESS NOTES
"Daily Note     Today's date: 3/25/2025  Patient name: Izabela Moeller  : 1950  MRN: [id number]  Referring provider: Marcie Parson PA-C  Dx:   Encounter Diagnosis     ICD-10-CM    1. Benign meningioma (HCC)  D32.9       2. Right foot drop  M21.371       3. Right arm weakness  R29.898               Start Time: 1630  Stop Time: 1715  Total time in clinic (min): 45 minutes    Subjective: Patient reports that she is doing well - last time she felt very tired. \"Was a good workout though\". Picks up her new brace on .       Objective: See treatment diary below    NMR in // bars:  - Resisted walking with purple band around ankles 2 laps with RW    - March with, 3s iso, 1UE support 4 laps, 3# ankle weight BLE   - Mod tandem amb with 1UE assist 4 laps  - STS with 10#tidal tank 2x10  - high knee  with 1 foot on 6 inch step with base of floor to top of 2nd step ( total height 12 inches)   - 1.1  mph 5% incline x 10 mins     Manual:  Ankle mob with 12 inch step with PT resistance 2 minutes with active fwd lunge     Assessment: Tolerated treatment well.   Patient participated in skilled PT session focused on balance, gait, and endurance.   Patient continues with decreased R ankle DF causing for compensation during activities but is very functional in doing them - just not optimal overall.  Patient experienced no loss fo balance during exercises. Patient would continue to benefit from skilled PT interventions to address deficits with balance, gait, and endurance. Patient demonstrated fatigue post treatment      Plan: Continue per plan of care.        POC expires Unit limit Auth Expiration date PT/OT + Visit Limit? Co-Insurance   3/30/25 N/a pend BOMN Yes                               Visit/Unit Tracking  - PN 2/ - PN 3/ 3/6 3/11 3/13 3/18 3/20 3/25                              "

## 2025-03-27 ENCOUNTER — EVALUATION (OUTPATIENT)
Age: 75
End: 2025-03-27
Payer: MEDICARE

## 2025-03-27 DIAGNOSIS — R29.898 RIGHT ARM WEAKNESS: ICD-10-CM

## 2025-03-27 DIAGNOSIS — M21.371 RIGHT FOOT DROP: ICD-10-CM

## 2025-03-27 DIAGNOSIS — D32.9 BENIGN MENINGIOMA (HCC): Primary | ICD-10-CM

## 2025-03-27 PROCEDURE — 97110 THERAPEUTIC EXERCISES: CPT

## 2025-03-27 PROCEDURE — 97112 NEUROMUSCULAR REEDUCATION: CPT

## 2025-03-27 NOTE — PROGRESS NOTES
PT Re-Evaluation             POC expires Unit limit Auth Expiration date PT/OT + Visit Limit? Co-Insurance   3/30/25 N/a pend BOMN Yes    25                                               Visit/Unit Tracking  - PN 2/4 2/ - PN 3/4 3/6 3/11 3/13 3/18 3/20 3/25   3/27                                           Today's date: 3/27/2025  Patient name: Izabela Moeller  : 1950  MRN: [id number]  Referring provider: Marcie Parson PA-C  Dx:   Encounter Diagnosis     ICD-10-CM    1. Benign meningioma (HCC)  D32.9       2. Right foot drop  M21.371       3. Right arm weakness  R29.898               Assessment  Assessment details: Patient is a 74 y.o. Female who presents to skilled outpatient PT with impaired mobility with RLE hemiparesis 2/2 brain tumor removal in . Pt presents with associated impairments, including RLE synergy patterning, abnormal gait pattern, reduced gait speed, reduced LE strength/power, impaired functional mobility, and fall risk. Currently, Utilizing quad cane and AFO with household and community ambulation and has been very steady over the past month. Pt has goal of increased toe movement/strength, improved balance, ambulation without R AFO, and reduced spasticity. Currently exploring use of medication to reduce tone and marcela of new AFO to help control tone, improvements in tone have been inconsistent. Show mild change with achieving MDC this date for test and measures in 5x STS and TUG. She is considered at HIGH risk of falls per TRIMBLE, ABC, and 10mWT. Pt will benefit from skilled PT interventions for gait training, balance training, neuromuscular reeducation, strengthening, endurance training, and functional task practice to assist pt in improving current level of mobility and increasing QOL. Educated patient extensively on PT implication and  limitations, PT POC, importance of inc physical activity, need for AFO to reduce foot/toe drag (inc risk of falls), f/u with neurology for spasticity management, and Botox injections. Has met 5/5 STG and is progressing towards completion of LTGs. Extended POC through 6/27/25 with long term goals remaining appropriate at this time.       Impairments: Abnormal coordination, Abnormal gait, Abnormal muscle tone, Abnormal or restricted ROM, Activity intolerance, Impaired balance, Impaired physical strength, Lacks appropriate HEP, Poor posture, Poor body mechanics, Pain with function, Safety issue, Weight-bearing intolerance, Abnormal movement, Difficulty understanding, Abnormal muscle firing  Understanding of Dx/Px/POC: Good  Prognosis: Good    Patient verbalized understanding of POC.         Please contact me if you have any questions or recommendations. Thank you for the referral and the opportunity to share in Izabela Avinachristopherwilfredohusam's care.        Plan  Plan details:   Patient would benefit from: Skilled PT  Planned modality interventions: Biofeedback, Cryotherapy, TENS, Thermotherapy  Planned therapy interventions: Abdominal trunk stabilization, ADL training, Balance, Balance/WB training, Breathing training, Body mechanics training, Coordination, Functional ROM exercises, Gait training, HEP, Joint Mobilization, Manual Therapy, Brock taping, Motor coordination training, Neuromuscular re-education, Patient education, Postural training, Strengthening, Stretching, Therapeutic activities, Therapeutic exercises, Therapeutic training, Transfer training, Activity modification, Work reintegration  Frequency: 1-2x/wk  Duration in weeks: 12 weeks  Plan of Care beginning date: 3/27/25  Plan of Care expiration date: 3 months - 6/27/25  Treatment plan discussed with: Patient       Goals  Short Term Goals (4 weeks):    - Patient will improve time on TUG by 2.9 seconds to facilitate improved safety in all ambulation met  - Patient  will be independent in basic HEP 2-3 weeks met  - Patient will improve 5xSTS score by 2.3 seconds to promote improved LE functional strength needed for ADLs met  - Patient will perform TRIMBLE met  - Patient will perform 6MWT met    Long Term Goals (12 weeks):  - Patient will be independent in a comprehensive home exercise program Progressing   - Patient will improve gait speed by 0.18 m/s to improve safety with community ambulation Progressing   - Patient will improve TRIMBLE by 6 points in order to improve static balance and reduce risk for falls Progressing   - Patient will improve 6 Minute Walk Test score by 190 feet to promote improved cardiovascular endurance Progressing   - Patient will report 50% reduction in near falls in order to improve safety with functional tasks and reduce his risk for falls Progressing   - Patient will report going on walks at least 3 days per week to promote independence and improved cardiovascular endurance Progressing   - Patient will be able to ascend/descend stairs reciprocally with 1 UE assist to promote independence and safety with ADLs Progressing   - Patient will report 50% reduction in near falls when ambulating on uneven terrain Progressing       Cut off score    All date taken from [facility]TA Neuro Section or Rehab Measures      Trimble/56  MDC: 6 pts  Age Norms:  60-69: M - 55   F - 55  70-79: M - 54   F - 53  80-89: M - 53   F - 50 5xSTS: Tod et al 2010  MDC: 2.3 sec  Age Norms:  60-69: 11.4 sec  70-79: 12.6 sec  80-89: 14.8 sec   TUG  MDC: 4.14 sec  Cut off score:  >13.5 sec community dwelling adults  >32.2 frail elderly  <20 I for basic transfers  >30 dependent on transfers 10 Meter Walk Test: Annamaria et al 2011  MDC: 0.18 m/s  20-29: M - 1.35 m   F - 1.34 m  30-39: M - 1.43 m   F - 1.34 m  40-49: M - 1.43 m   F - 1.39 m  50-59: M - 1.43 m   F - 1.31 m  60-69: M - 1.34 m   F - 1.24 m  70-79: M - 1.26 m   F - 1.13 m  80-89: M - 0.97 m   F - 0.94 m    Household  Ambulator < 0.4 m/s  Limited Community Ambulator 0.4 - 0.8 m/s  Community Ambulator 0.8 - 1.2 m/s  Safely cross the street > 1.2 m/s   FGA  MCID: 4 pts  Geriatrics/community < 22/30 fall risk  Geriatrics/community < 20/30 unexplained falls    DGI  MDC: vestibular - 4 pts  MDC: geriatric/community - 3 pts  Falls risk <19/24 mCTSIB  Norm: 20-60 yrs  Eyes open firm: norm sway 0.21-0.48  Eyes closed firm: norm sway 0.48-0.99  Eyes open foam: norm sway 0.38-0.71  Eyes closed foam: norm sway 0.70-2.22   6 Minute Walk Test  MDC: 190.98 ft  MCID: 164 ft    Age Norms  60-69: M - 1876 ft (571.80 m)  F - 1765 ft (537.98 m)  70-79: M - 1729 ft (527.00 m)  F - 1545 ft (470.92 m)  80-89: M - 1368 ft (416.97 m)  F - 1286 ft (391.97 m) ABC: Yves & Soto, 2003  <67% increased risk for falls   [place] Monofilaments  Evaluator Size:        Force (grams):          Hand/Dorsal Thresholds:        Plantar Thresholds:  - 1.65                       - 0.008                       - Normal                                 - Normal  - 2.36                       - 0.02                         - Normal                                 - Normal  - 2.44                       - 0.04                         - Normal                                 - Normal  - 2.83                       - 0.07                         - Normal                                 - Normal  - 3.22                       - 0.16                         - Diminished light touch          - Normal  - 3.61                       - 0.40                         - Diminished light touch          - Normal  - 3.84                       - 0.60                         - Diminished protective           - Diminished light touch  - 4.08                       - 1.00                         - Diminished protective           - Diminished light touch  - 4.17                       - 1.40                         - Diminished protective           - Diminished light touch  - 4.31                        - 2.00                         - Diminished protective           - Diminished light touch  - 4.56                       - 4.00                         - Loss of protective sense      - Diminished protective  - 4.74                       - 6.00                         - Loss of protective sense      - Diminished protective  - 4.93                       - 8.00                         - Loss of protective sense      - Diminished protective  - 5.07                       - 10.0                         - Loss of protective sense     - Loss of protective sense  - 5.18                       - 15.0                         - Loss of protective sense     - Loss of protective sense  - 5.46                       - 26.0                         - Loss of protective sense     - Loss of protective sense  - 5.88                       - 60.0                         - Loss of protective sense     - Loss of protective sense  - 6.10                       - 100                          - Loss of protective sense     - Loss of protective sense  - 6.45                       - 180                          - Loss of protective sense     - Loss of protective sense  - 6.65                       - 300                          - Deep pressure sense only  - Deep pressure sense only       Subjective    History of Present Illness  - Mechanism of injury: Meningioma s/p L frontoparietal craniotomy (2020) resulting in residual R-sided heaviness, weakness (LE > UE), numbness, and tingling. Patient arrives with primary complaint of lack of toe movement (absent for past year) and excessive supination. Shares it has worsened in recent months.     Update 2/27/25: Denies falls or changes in past month. Got a new script for a brace due to issues with current one with noted issues with toes curling still.     Update 3/27/25: Reports PT feels about the same. Feels that her walking capabilities. Feels that that she would benefit from doing some mat  table exercises. Feels that would be beneficial for her strength.  When her tone is bad and her toes want to curl, that really impacts her ability to walk.     - Primary AD: narrow base QC  - Assist level at home: mod IND (needs assistance with R AFO don)  - Decreased fine motor tasks: No    Patient goal:   Get the toes moving again  Reduce inversion/supination  Reduce heaviness in R LE    Pain  - Location: R-sided LBP  - Aggravating factors: rotation    Social Support  - Steps to enter house: 1 VIOLET  - Stairs in house: 13 with R HR, inc performance time  - Lives in: [facility]  - Lives with:  and son    - Employment status: retired retail sales  - Hand dominance: R-handed    Treatments  - Previous treatment: outpatient PT  - Current treatment: neurology  - Diagnostic Testing: see chart      Objective     LE MMT (not tested today)   - R Hip Flexion: 3+/5   L Hip Flexion: 4-/5  - R Hip Extension: 3+/5  L Hip Extension: 4-/5  - R Hip Abduction: 4-/5  L Hip Abduction: 4-/5  - R Hip Adduction: 4-/5  L Hip Adduction: 4-/5  - R Knee Extension: 4+/5  L Knee Extension: 4+/5  - R Knee Flexion: 3+/5  L Knee Flexion: 4+/5  - R Ankle DF: 0/5   L Ankle DF: 4+/5  - R Ankle PF: 2/5   L Ankle PF: 4+/5    Sensation (NT)   - Light touch: wfl  - Deep pressure: wfl    Coordination (NT)   - Heel to Shin: defer  - Alternate Toe Taps: defer  - Finger to Nose: slightly dysmetric on R, improved accuracy with reduced speed    Reflexes/Clonus (NT)   - Clonus: No    Modified Marlo (NT)  - R knee extension: 2 - easily moved, but more marked tone throughout most ROM   - L knee extension: 0 - no increase in tone  - R knee flexion: 1 - catch and release or minimum resistance at end range    - L knee flexion: 0 - no increase in tone  - R ankle DF: 1 - catch and release or minimum resistance at end range     - L ankle DF: 0 - no increase in tone  - R ankle inversion: 1 - catch and release or minimum resistance at end range    - L ankle inversion:  0 - no increase in tone  - R ankle eversion: 1+ - catch followed by minimum resistance throughout remainder (< half ROM) - L ankle eversion: 0 - no increase in tone           Outcome Measures Initial Eval  12/30 1/30 2/27/25 3/27/25      5xSTS 16.95 sec no UE, 5/7 reps (2 failed due to insufficient ant WS and poor R foot placement) 11.30s no UE assist, continued difficulty with anterior WS 10.61s no UE A  9.5s no UE      TUG  - Regular     19.98 sec with QC     15.77s with QC   16.06 sec    14.09s QC      10 meter 0.39 m/s with QC 0.53 m/s with QC .51` m/s w QC 0.48 m/s w QC      TRIMBLE NV 36/56 37/56 42/56     FGA defer defer       DGI defer defer       mCTSIB  - FTEO (firm)  - FTEC (firm)  - FTEO (foam)  - FTEC (foam)   defer   defer   30 sec   30 sec   3 sec   LOB    30 sec  30 sec  30  22 seconds     6MWT  ft 700 ft  660 ft      ABC 45% 43.13%  58.13%                      Daily Treatment   NMR  FOM   SLR supine 2x10 ea   SLR s/l 2x10 ea   Supine bridge 2x10 ea     Precautions: epilepsy, hx meningioma, arthritis, pneumonia     Past Medical History:   Diagnosis Date    Arthritis     L shoulder    Brain compression (HCC) 9/11/2020    Hypercholesteremia     Pneumonia

## 2025-04-01 ENCOUNTER — OFFICE VISIT (OUTPATIENT)
Age: 75
End: 2025-04-01
Payer: MEDICARE

## 2025-04-01 DIAGNOSIS — M21.371 RIGHT FOOT DROP: ICD-10-CM

## 2025-04-01 DIAGNOSIS — R29.898 RIGHT ARM WEAKNESS: ICD-10-CM

## 2025-04-01 DIAGNOSIS — D32.9 BENIGN MENINGIOMA (HCC): Primary | ICD-10-CM

## 2025-04-01 PROCEDURE — 97112 NEUROMUSCULAR REEDUCATION: CPT | Performed by: PHYSICAL THERAPIST

## 2025-04-01 NOTE — PROGRESS NOTES
"Daily Note     Today's date: 2025  Patient name: Izabela Moeller  : 1950  MRN: [id number]  Referring provider: Marcie Parson PA-C  Dx:   Encounter Diagnosis     ICD-10-CM    1. Benign meningioma (HCC)  D32.9       2. Right foot drop  M21.371       3. Right arm weakness  R29.898                 Start Time: 0430  Stop Time: 0515  Total time in clinic (min): 45 minutes    Subjective: Patient reports that she is doing well - last time she felt very tired. \"Was a good workout though\". Picks up her new brace on .       Objective: See treatment diary below    NMR in // bars:  - March with, 3s iso, 1UE support 4 laps, 4# ankle weight BLE   - Mod tandem amb with 1UE HHA 4 laps  - STS with 10#tidal tank 2x10  - WC resisted walking 130'   - Error augmentation walk 260' 10# RLE with HHA  - Time trials walks 130' HHA   1st: 00:49   2nd: 0043    Manual:  Ankle mob on foam pad PT resistance 2 minutes with active sit to stand 15x     Assessment: Tolerated treatment well.   Patient participated in skilled PT session focused on balance, gait, and endurance.   Patient continues with decreased R ankle DF causing for functional compensation at the hip during activities.  Patient experienced minor loss of balance with error augmentation and decreased HHA with tandem walks, but had good balance overall with activities. Pt needed minor cueing to keep feet parallel during sit-to-stand activity for even force distribution.  Recommend progressing balance tasks onto non-compliant surface to challenge pt.  Patient would continue to benefit from skilled PT interventions to address deficits with balance, gait, and endurance. Patient demonstrated fatigue post treatment      Plan: Continue per plan of care.        POC expires Unit limit Auth Expiration date PT/OT + Visit Limit? Co-Insurance   3/30/25 N/a pend BOMN Yes                               Visit/Unit Tracking  - PN  " 2/11 2/13 2/18 2/20 2/25 2/27 - PN 3/4 3/6 3/11 3/13 3/18 3/20 3/25  3/27 -RE   4/1                       /

## 2025-04-03 ENCOUNTER — OFFICE VISIT (OUTPATIENT)
Age: 75
End: 2025-04-03
Payer: MEDICARE

## 2025-04-03 DIAGNOSIS — D32.9 BENIGN MENINGIOMA (HCC): Primary | ICD-10-CM

## 2025-04-03 DIAGNOSIS — R29.898 RIGHT ARM WEAKNESS: ICD-10-CM

## 2025-04-03 DIAGNOSIS — M21.371 RIGHT FOOT DROP: ICD-10-CM

## 2025-04-03 PROCEDURE — 97112 NEUROMUSCULAR REEDUCATION: CPT | Performed by: PHYSICAL THERAPIST

## 2025-04-03 NOTE — PROGRESS NOTES
"Daily Note     Today's date: 4/3/2025  Patient name: Izabela Moeller  : 1950  MRN: [id number]  Referring provider: Marcie Parson PA-C  Dx:   Encounter Diagnosis     ICD-10-CM    1. Benign meningioma (HCC)  D32.9       2. Right foot drop  M21.371       3. Right arm weakness  R29.898           Start Time: 1630  Stop Time: 1715  Total time in clinic (min): 45 minutes    Subjective: Pt feels good today, no stiffness this time.      Objective: See treatment diary below    Subjective: Patient reports that she is doing well - last time she felt very tired. \"Was a good workout though\". Picks up her new brace on .       Objective: See treatment diary below    NMR in // bars:   4# BAW throughout  - Mod tandem amb with 1UE HHA 4 laps  - Lateral stepping 4 laps on blue foam pad, 1UE HHA  - Fwd/Bwd walking 4 laps 1UE HHA      In open space: 4# BAW throughout  - STS with 10#tidal tank 1x10  - Resisted walking 260'   - Lateral stepping with star pattern step onto river rocks, 3x  - Forward stepping with star pattern onto river rocks, 3x    Manual: not today  Ankle mob on foam pad PT resistance 2 minutes with active sit to stand 15x     Assessment: Tolerated treatment well.   Patient participated in skilled PT session focused on balance, gait, and endurance.   Patient continues with decreased R ankle DF causing for functional compensation at the hip during activities.  PT utilized HHA and 4# BAW throughout session. Patient experienced minor loss of balance, but is correcting without PT assistance. Continue with balance tasks and gait tasks to improve balance. Patient would continue to benefit from skilled PT interventions to address deficits with balance, gait, and endurance. Patient demonstrated fatigue post treatment      Plan: Continue per plan of care.             POC expires Unit limit Auth Expiration date PT/OT + Visit Limit? Co-Insurance   3/30/25 N/a pend BOMN Yes                             "   Visit/Unit Tracking  12/30 1/7 1/9 1/14 1/16 1/21 1/28 1/30- PN 2/4 2/6 2/11 2/13 2/18 2/20 2/25 2/27 - PN 3/4 3/6 3/11 3/13 3/18 3/20 3/25  3/27 -RE   4/1 4/3

## 2025-04-08 ENCOUNTER — OFFICE VISIT (OUTPATIENT)
Age: 75
End: 2025-04-08
Payer: MEDICARE

## 2025-04-08 DIAGNOSIS — R29.898 RIGHT ARM WEAKNESS: ICD-10-CM

## 2025-04-08 DIAGNOSIS — M21.371 RIGHT FOOT DROP: ICD-10-CM

## 2025-04-08 DIAGNOSIS — D32.9 BENIGN MENINGIOMA (HCC): Primary | ICD-10-CM

## 2025-04-08 PROCEDURE — 97112 NEUROMUSCULAR REEDUCATION: CPT | Performed by: PHYSICAL THERAPIST

## 2025-04-08 NOTE — PROGRESS NOTES
Daily Note     Today's date: 2025  Patient name: Izabela Moeller  : 1950  MRN: [id number]  Referring provider: Marcie Parson PA-C  Dx:   Encounter Diagnosis     ICD-10-CM    1. Benign meningioma (HCC)  D32.9       2. Right foot drop  M21.371       3. Right arm weakness  R29.898             Start Time: 1630  Stop Time: 1715  Total time in clinic (min): 45 minutes    Subjective: Pt feels good today.  Has new brace and shoes and is happy with them so far.    Objective: See treatment diary below    NMR:     - Lateral stepping 4 laps, 1UE HHA  - Fwd/Bwd walking 4 laps 1UE HHA  - Supine R foot <> mat 20x  - STS with 10#tidal tank 2x10  - 350ft walk CGA no AD    Not today:  - BAW 4#  - Mod tandem amb with 1UE HHA 4 laps  - Resisted walking 260'   - Lateral stepping with star pattern step onto river rocks, 3x  - Forward stepping with star pattern onto river rocks, 3x    Manual: not today  Ankle mob on foam pad PT resistance 2 minutes with active sit to stand 15x     Assessment: Tolerated treatment well.    Patient participated in skilled PT session focused on balance, gait, and endurance.   Patient continues with decreased R ankle DF causing for functional compensation at the hip during activities.   PT held ankle wts this session due to new brace and shoes, used L foot wedge to promote R WB. Incorporated supine foot <> mat exercise followed by walking, CGA.  Pt had increased hip flexion, extension and power in first 200' of walking, required cuing during last 150' to continue with hip motion.  PT education to remove insole in R shoe to promote even WB through the feet.  Continue with balance tasks and gait tasks to improve balance. Patient would continue to benefit from skilled PT interventions to address deficits with balance, gait, and endurance. Patient demonstrated fatigue post treatment      Plan: Continue per plan of care.             POC expires Unit limit Auth Expiration date PT/OT + Visit Limit?  Co-Insurance   3/30/25 N/a pend BOMN Yes                               Visit/Unit Tracking  12/30 1/7 1/9 1/14 1/16 1/21 1/28 1/30- PN 2/4 2/6 2/11 2/13 2/18 2/20 2/25 2/27 - PN 3/4 3/6 3/11 3/13 3/18 3/20 3/25  3/27 -RE   4/1 4/3 4/8

## 2025-04-10 ENCOUNTER — OFFICE VISIT (OUTPATIENT)
Age: 75
End: 2025-04-10
Payer: MEDICARE

## 2025-04-10 DIAGNOSIS — M21.371 RIGHT FOOT DROP: ICD-10-CM

## 2025-04-10 DIAGNOSIS — R29.898 RIGHT ARM WEAKNESS: ICD-10-CM

## 2025-04-10 DIAGNOSIS — D32.9 BENIGN MENINGIOMA (HCC): Primary | ICD-10-CM

## 2025-04-10 PROCEDURE — 97112 NEUROMUSCULAR REEDUCATION: CPT | Performed by: PHYSICAL THERAPIST

## 2025-04-10 NOTE — PROGRESS NOTES
"Daily Note     Today's date: 4/10/2025  Patient name: Izabela Moeller  : 1950  MRN: [id number]  Referring provider: Marcie Parson PA-C  Dx:   Encounter Diagnosis     ICD-10-CM    1. Benign meningioma (HCC)  D32.9       2. Right foot drop  M21.371       3. Right arm weakness  R29.898             Start Time: 1630  Stop Time: 1715  Total time in clinic (min): 45 minutes    Subjective: Pt feels good today.  Pt did not remove insole of R shoe, wants to wait until brace is adjusted next week to make any changes to her shoes.    Objective: See treatment diary below    NMR:     - Lateral stepping, 9\" hurdles 4 laps, 1UE HHA  - Fwd/Bwd walking 6\" hurdles 4 laps 1UE HHA  - Supine R foot <> mat 20x  - 550ft walk CGA no AD    Not today:  - BAW 4#  - Mod tandem amb with 1UE HHA 4 laps  - Resisted walking 260'   - Lateral stepping with star pattern step onto river rocks, 3x  - Forward stepping with star pattern onto river rocks, 3x    Manual: not today  Ankle mob on foam pad PT resistance 2 minutes with active sit to stand 15x     Assessment: Tolerated treatment well.    Patient participated in skilled PT session focused on balance, gait, and endurance.   Patient continues with decreased R ankle DF causing for functional compensation at the hip during activities.   PT used L foot wedge to promote R WB. Pt was able to walk 550' with no signs of fatigue in the R leg.  Increased bethanie height to 9\" for lateral hurdles. Pt would like to work on ankle stretching and strengthening next session.  Recommend 9\" for forward walking next session, do R ankle stretching and provide pt education on poor likeliness of regaining strength in R ankle. Continue with balance tasks and gait tasks to improve balance. Patient would continue to benefit from skilled PT interventions to address deficits with balance, gait, and endurance. Patient demonstrated fatigue post treatment      Plan: Continue per plan of care.             POC expires " Unit limit Auth Expiration date PT/OT + Visit Limit? Co-Insurance   3/30/25 N/a pend BOMN Yes                               Visit/Unit Tracking  12/30 1/7 1/9 1/14 1/16 1/21 1/28 1/30- PN 2/4 2/6 2/11 2/13 2/18 2/20 2/25 2/27 - PN 3/4 3/6 3/11 3/13 3/18 3/20 3/25  3/27 -RE   4/1 4/3 4/8 4/10

## 2025-04-15 ENCOUNTER — OFFICE VISIT (OUTPATIENT)
Age: 75
End: 2025-04-15
Payer: MEDICARE

## 2025-04-15 DIAGNOSIS — R29.898 RIGHT ARM WEAKNESS: ICD-10-CM

## 2025-04-15 DIAGNOSIS — D32.9 BENIGN MENINGIOMA (HCC): Primary | ICD-10-CM

## 2025-04-15 DIAGNOSIS — M21.371 RIGHT FOOT DROP: ICD-10-CM

## 2025-04-15 PROCEDURE — 97112 NEUROMUSCULAR REEDUCATION: CPT

## 2025-04-15 NOTE — PROGRESS NOTES
"Daily Note     Today's date: 4/10/2025  Patient name: Izabela Moeller  : 1950  MRN: [id number]  Referring provider: Marcie Parson PA-C  Dx:   Encounter Diagnosis     ICD-10-CM    1. Benign meningioma (HCC)  D32.9       2. Right foot drop  M21.371       3. Right arm weakness  R29.898             Start Time: 1630  Stop Time: 1715  Total time in clinic (min): 45 minutes    Subjective: Pt feels good today.     Objective: See treatment diary below    NMR: 4# BAW   - Lateral stepping with high knee iso 3s, 60'  1UE HHA  - Fwd walking 122' 1UE HHA  - HKM 3s iso, 60'  1UE HHA  - timed walk 122 ft 2x: 1st: 50s, 2nd: 39s  - Timed walk no weight 122ft 2x: 1st: 39s 2nd: 34s  - [place] stepping in a star pattern using lateral and cross stepping 5x each side  - R ankle Mobilization with movement on a 12\" step - manual  - 1 min standing calf stretch at the step R side      Not today:  - Mod tandem amb with 1UE HHA 4 laps  - Resisted walking 260'   - Lateral stepping with star pattern step onto river rocks, 3x  - Forward stepping with star pattern onto river rocks, 3x  - Supine R foot <> mat 20x  - 550ft walk CGA no AD    Manual: not today  Ankle mob on foam pad PT resistance 2 minutes with active sit to stand 15x     Assessment: Tolerated treatment well.  Patient participated in skilled PT session focused on balance, gait, and endurance.  Patient had loss of balance requiring PT assist at beginning of treatment session. Began session with ankle mobilization with movement on a 12\" step. Re-introduced 4# BL ankle weights this session and lateral star stepping.  Did time trials to work on increased speed with gait today.  Patient responded well all exercises, reporting increased challenge with star pattern exercises and marked increase in gait speed with time trials.  Continue with balance tasks and gait tasks to improve balance. Patient education on doing R LE stretches at home, holding 30s -1 min 3x per day with a " demonstration at start of session.  Patient would continue to benefit from skilled PT interventions to address deficits with balance, gait, and endurance. Patient demonstrated fatigue post treatment      Plan: Continue per plan of care.             POC expires Unit limit Auth Expiration date PT/OT + Visit Limit? Co-Insurance   3/30/25 N/a pend BOMN Yes                               Visit/Unit Tracking  12/30 1/7 1/9 1/14 1/16 1/21 1/28 1/30- PN 2/4 2/6 2/11 2/13 2/18 2/20 2/25 2/27 - PN 3/4 3/6 3/11 3/13 3/18 3/20 3/25  3/27 -RE   4/1 4/3 4/8 4/10 4/15

## 2025-04-17 ENCOUNTER — OFFICE VISIT (OUTPATIENT)
Age: 75
End: 2025-04-17
Payer: MEDICARE

## 2025-04-17 DIAGNOSIS — R29.898 RIGHT ARM WEAKNESS: ICD-10-CM

## 2025-04-17 DIAGNOSIS — D32.9 BENIGN MENINGIOMA (HCC): Primary | ICD-10-CM

## 2025-04-17 DIAGNOSIS — M21.371 RIGHT FOOT DROP: ICD-10-CM

## 2025-04-17 PROCEDURE — 97112 NEUROMUSCULAR REEDUCATION: CPT | Performed by: PHYSICAL THERAPIST

## 2025-04-17 NOTE — PROGRESS NOTES
Daily Note     Today's date: 4/10/2025  Patient name: Izabela Moeller  : 1950  MRN: [id number]  Referring provider: Marcie Parson PA-C  Dx:   Encounter Diagnosis     ICD-10-CM    1. Benign meningioma (HCC)  D32.9       2. Right foot drop  M21.371       3. Right arm weakness  R29.898             Start Time: 1630  Stop Time: 1715  Total time in clinic (min): 45 minutes    Subjective: Pt feels good today.     Objective: See treatment diary below    NMR: 4# BAW   - STS with 7.5# TT 2x15  - HKM 3s iso, at short // bar; 4 laps  - Timed walk 122 ft 2x: 1st: 40s, 2nd: 35s  - Timed walk no weight 122ft 2x: 1st: 43s 2nd: 37s  - Resisted walk with RW and blue band: 300ft  - [place] stepping in a star pattern using lateral and cross stepping 5x each side    Not today:  - Mod tandem amb with 1UE HHA 4 laps  - Resisted walking 260'   - Lateral stepping with star pattern step onto river rocks, 3x  - Forward stepping with star pattern onto river rocks, 3x  - Supine R foot <> mat 20x  - 550ft walk CGA no AD  Ankle mob on foam pad PT resistance 2 minutes with active sit to stand 15x     Assessment: Tolerated treatment well.  Patient participated in skilled PT session focused on balance, gait, and endurance.  Re-introduced resisted walking today with new brace & shoes. Patient responded well to resisted walking with a blue band and RW for stability, with fatigue after 300' resisted walk.  Patient would continue to benefit from skilled PT interventions to address deficits with balance, gait, and endurance. Patient demonstrated fatigue post treatment      Plan: Continue per plan of care.             POC expires Unit limit Auth Expiration date PT/OT + Visit Limit? Co-Insurance   3/30/25 N/a pend BOMN Yes                               Visit/Unit Tracking  - PN 2/4 2 - PN 3/4 3/6 3/11 3/13 3/18 3/20 3/25  3/27 -RE   4/1 4/3 4/8 4/10 4/15 4/17

## 2025-04-22 ENCOUNTER — OFFICE VISIT (OUTPATIENT)
Age: 75
End: 2025-04-22
Payer: MEDICARE

## 2025-04-22 DIAGNOSIS — R29.898 RIGHT ARM WEAKNESS: ICD-10-CM

## 2025-04-22 DIAGNOSIS — M21.371 RIGHT FOOT DROP: ICD-10-CM

## 2025-04-22 DIAGNOSIS — D32.9 BENIGN MENINGIOMA (HCC): Primary | ICD-10-CM

## 2025-04-22 PROCEDURE — 97112 NEUROMUSCULAR REEDUCATION: CPT | Performed by: PHYSICAL THERAPIST

## 2025-04-22 NOTE — PROGRESS NOTES
Daily Note     Today's date: 2025  Patient name: Izabela Moeller  : 1950  MRN: [id number]  Referring provider: Marcie Parson PA-C  Dx:   Encounter Diagnosis     ICD-10-CM    1. Benign meningioma (HCC)  D32.9       2. Right foot drop  M21.371       3. Right arm weakness  R29.898           Start Time: 1630  Stop Time: 1715  Total time in clinic (min): 45 minutes    Subjective: Pt feels good today.  Had brace adjusted, feels good so far.     Objective: See treatment diary below    NMR: 4# BAW   - STS with 10# TT 2x15  - Walking with '  - HKM 3s iso, at ballet bar; 3 laps  - High knee  with hover over top step 10x each side  - Mobilization with movement on R ankle      Not today:  - Mod tandem amb with 1UE HHA 4 laps  - Resisted walking 260'   - Lateral stepping with star pattern step onto river rocks, 3x  - Forward stepping with star pattern onto river rocks, 3x  - Supine R foot <> mat 20x  - 550ft walk CGA no AD  Ankle mob on foam pad PT resistance 2 minutes with active sit to stand 15x     Assessment: Tolerated treatment well.  Patient participated in skilled PT session focused HIGT.  Focused on endurance at start of session to assess recent brace adjustments.  Patient reported tightness in the R ankle, did mobilization with movement to promote better weightbearing and foot clearance.  Introduced high knee  with hover over top step to address LE weakness and improve weightbearing on R LE.  Patient responded well to this, using BUE support and maintaining balance throughout with good foot clearnace and fatigue after 10th repetition.  Patient would continue to benefit from skilled PT interventions to address deficits with balance, gait, and endurance. Patient demonstrated fatigue post treatment      Plan: Continue per plan of care.             POC expires Unit limit Auth Expiration date PT/OT + Visit Limit? Co-Insurance   3/30/25 N/a pend BOMN Yes                                Visit/Unit Tracking  12/30 1/7 1/9 1/14 1/16 1/21 1/28 1/30- PN 2/4 2/6 2/11 2/13 2/18 2/20 2/25 2/27 - PN 3/4 3/6 3/11 3/13 3/18 3/20 3/25  3/27 -RE   4/1 4/3 4/8 4/10 4/15 4/17 4/22

## 2025-04-24 ENCOUNTER — OFFICE VISIT (OUTPATIENT)
Age: 75
End: 2025-04-24
Payer: MEDICARE

## 2025-04-24 DIAGNOSIS — R29.898 RIGHT ARM WEAKNESS: ICD-10-CM

## 2025-04-24 DIAGNOSIS — D32.9 BENIGN MENINGIOMA (HCC): Primary | ICD-10-CM

## 2025-04-24 DIAGNOSIS — M21.371 RIGHT FOOT DROP: ICD-10-CM

## 2025-04-24 PROCEDURE — 97112 NEUROMUSCULAR REEDUCATION: CPT | Performed by: PHYSICAL THERAPIST

## 2025-04-24 NOTE — PROGRESS NOTES
"Daily Note     Today's date: 2025  Patient name: Izabela Moeller  : 1950  MRN: [id number]  Referring provider: Marcie Parson PA-C  Dx:   Encounter Diagnosis     ICD-10-CM    1. Benign meningioma (HCC)  D32.9       2. Right foot drop  M21.371       3. Right arm weakness  R29.898                      Subjective: Pt feels good today.  Had brace adjusted, feels good so far.     Objective: See treatment diary below    NMR:   - neuro priming with nustep 5 minutes lvl 4 to reduce tone in LEs.   - STS with 10# TT 2x15  - supine R foot up and down mat table with focusing on keeping knee flexed with manual resistance 20 reps followed by another 10 reps. Total reps 30   - Amb around clinic with SBQC 120 feet x 1   - Amb HHA around clinic 2 laps 240 feet   - Amb around clinic no  feet   - backwards walking on treadmill at .5 mph 10 minutes   - high knee  on  step with 1 foot on 6\" step and other leg going from ground to top of following step (12\") 30 reps R side      Not today:  - Mod tandem amb with 1UE HHA 4 laps  - Resisted walking 260'   - Lateral stepping with star pattern step onto river rocks, 3x  - Forward stepping with star pattern onto river rocks, 3x  - Supine R foot <> mat 20x  - 550ft walk CGA no AD  Ankle mob on foam pad PT resistance 2 minutes with active sit to stand 15x     Assessment: Tolerated treatment well.  Patient participated in skilled PT session focused HIGT.  Improvement in active leg clearance post supine mat exercise. Challenged with TM walking with tendency to externally rotate at the hip and step off the treadmill. Patient would continue to benefit from skilled PT interventions to address deficits with balance, gait, and endurance. Patient demonstrated fatigue post treatment      Plan: Continue per plan of care.             POC expires Unit limit Auth Expiration date PT/OT + Visit Limit? Co-Insurance   3/30/25 N/a pend BOMN Yes                             "   Visit/Unit Tracking  12/30 1/7 1/9 1/14 1/16 1/21 1/28 1/30- PN 2/4 2/6 2/11 2/13 2/18 2/20 2/25 2/27 - PN 3/4 3/6 3/11 3/13 3/18 3/20 3/25  3/27 -RE   4/1 4/3 4/8 4/10 4/15 4/17 4/22 4/24

## 2025-04-29 ENCOUNTER — EVALUATION (OUTPATIENT)
Age: 75
End: 2025-04-29
Payer: MEDICARE

## 2025-04-29 DIAGNOSIS — D32.9 BENIGN MENINGIOMA (HCC): Primary | ICD-10-CM

## 2025-04-29 DIAGNOSIS — R29.898 RIGHT ARM WEAKNESS: ICD-10-CM

## 2025-04-29 DIAGNOSIS — M21.371 RIGHT FOOT DROP: ICD-10-CM

## 2025-04-29 PROCEDURE — 97112 NEUROMUSCULAR REEDUCATION: CPT | Performed by: PHYSICAL THERAPIST

## 2025-04-29 NOTE — PROGRESS NOTES
Progress Note            POC expires Unit limit Auth Expiration date PT/OT + Visit Limit? Co-Insurance   3/30/25 N/a pend BOMN Yes    25                                               Visit/Unit Tracking  - PN 2/4 / - PN 3/4 3/6 3/11 3/13 3/18 3/20 3/25  3/27 -RE   4/1 4/3 4/8 4/10 4/15 4/17 4/22 4/24 4/29- PN                   Today's date: 2025  Patient name: Izabela Moeller  : 1950  MRN: [id number]  Referring provider: Marcie Parson PA-C  Dx:   Encounter Diagnosis     ICD-10-CM    1. Benign meningioma (HCC)  D32.9       2. Right foot drop  M21.371       3. Right arm weakness  R29.898                 Assessment  Assessment details: Patient is a 74 y.o. Female who presents to skilled outpatient PT with impaired mobility with RLE hemiparesis 2/2 brain tumor removal in . Pt presents with associated impairments, including RLE synergy patterning, abnormal gait pattern, reduced gait speed, reduced LE strength/power, impaired functional mobility, and fall risk. Currently, Utilizing quad cane and AFO with household and community ambulation and has been very steady over the past month. Pt has goal of increased toe movement/strength, improved balance, ambulation without R AFO, and reduced spasticity. Pt has  new AFO to help control tone, improvements in tone have been inconsistent.  Show mild change with achieving MDC this date for test and measures in 5x STS and TUG. She is considered at HIGH risk of falls per TRIMBLE, ABC, and 10mWT. However pt made clinically significant improvements in TRIMBLE: previously 42/56, currently 48/56 (MCID: 6pts).  Pt has maintained 6MWT distance covered, TUG and 5TSTS scores from previous evaluation.  Pt will benefit from skilled PT interventions for gait training, balance training, neuromuscular reeducation, strengthening,  endurance training, and functional task practice to assist pt in improving current level of mobility and increasing QOL. Educated patient extensively on PT implication and limitations, PT POC, importance of inc physical activity, need for AFO to reduce foot/toe drag (inc risk of falls), f/u with neurology for spasticity management, and Botox injections. Has met 5/5 STG and is progressing towards completion of LTGs. Extended POC through 6/27/25 with long term goals remaining appropriate at this time.        Impairments: Abnormal coordination, Abnormal gait, Abnormal muscle tone, Abnormal or restricted ROM, Activity intolerance, Impaired balance, Impaired physical strength, Lacks appropriate HEP, Poor posture, Poor body mechanics, Pain with function, Safety issue, Weight-bearing intolerance, Abnormal movement, Difficulty understanding, Abnormal muscle firing  Understanding of Dx/Px/POC: Good  Prognosis: Good    Patient verbalized understanding of POC.         Please contact me if you have any questions or recommendations. Thank you for the referral and the opportunity to share in Izabela Avinajohn's care.        Plan  Plan details:   Patient would benefit from: Skilled PT  Planned modality interventions: Biofeedback, Cryotherapy, TENS, Thermotherapy  Planned therapy interventions: Abdominal trunk stabilization, ADL training, Balance, Balance/WB training, Breathing training, Body mechanics training, Coordination, Functional ROM exercises, Gait training, HEP, Joint Mobilization, Manual Therapy, Brock taping, Motor coordination training, Neuromuscular re-education, Patient education, Postural training, Strengthening, Stretching, Therapeutic activities, Therapeutic exercises, Therapeutic training, Transfer training, Activity modification, Work reintegration  Frequency: 1-2x/wk  Duration in weeks: 12 weeks  Plan of Care beginning date: 3/27/25  Plan of Care expiration date: 3 months - 6/27/25  Treatment plan  discussed with: Patient       Goals  Short Term Goals (4 weeks):    - Patient will improve time on TUG by 2.9 seconds to facilitate improved safety in all ambulation met  - Patient will be independent in basic HEP 2-3 weeks met  - Patient will improve 5xSTS score by 2.3 seconds to promote improved LE functional strength needed for ADLs met  - Patient will perform TRIMBLE met  - Patient will perform 6MWT met    Long Term Goals (12 weeks):  - Patient will be independent in a comprehensive home exercise program Progressing   - Patient will improve gait speed by 0.18 m/s to improve safety with community ambulation Progressing   - Patient will improve TRIMBLE by 6 points in order to improve static balance and reduce risk for falls Met  - Patient will improve 6 Minute Walk Test score by 190 feet to promote improved cardiovascular endurance Progressing   - Patient will report 50% reduction in near falls in order to improve safety with functional tasks and reduce his risk for falls Progressing   - Patient will report going on walks at least 3 days per week to promote independence and improved cardiovascular endurance Progressing   - Patient will be able to ascend/descend stairs reciprocally with 1 UE assist to promote independence and safety with ADLs Progressing   - Patient will report 50% reduction in near falls when ambulating on uneven terrain Progressing       Cut off score    All date taken from [facility]TA Neuro Section or Rehab Measures      Trimble/56  MDC: 6 pts  Age Norms:  60-69: M - 55   F - 55  70-79: M - 54   F - 53  80-89: M - 53   F - 50 5xSTS: Tod et al 2010  MDC: 2.3 sec  Age Norms:  60-69: 11.4 sec  70-79: 12.6 sec  80-89: 14.8 sec   TUG  MDC: 4.14 sec  Cut off score:  >13.5 sec community dwelling adults  >32.2 frail elderly  <20 I for basic transfers  >30 dependent on transfers 10 Meter Walk Test: Mar al 2011  MDC: 0.18 m/s  20-29: M - 1.35 m   F - 1.34 m  30-39: M - 1.43 m   F - 1.34 m  40-49:  M - 1.43 m   F - 1.39 m  50-59: M - 1.43 m   F - 1.31 m  60-69: M - 1.34 m   F - 1.24 m  70-79: M - 1.26 m   F - 1.13 m  80-89: M - 0.97 m   F - 0.94 m    Household Ambulator < 0.4 m/s  Limited Community Ambulator 0.4 - 0.8 m/s  Community Ambulator 0.8 - 1.2 m/s  Safely cross the street > 1.2 m/s   FGA  MCID: 4 pts  Geriatrics/community < 22/30 fall risk  Geriatrics/community < 20/30 unexplained falls    DGI  MDC: vestibular - 4 pts  MDC: geriatric/community - 3 pts  Falls risk <19/24 mCTSIB  Norm: 20-60 yrs  Eyes open firm: norm sway 0.21-0.48  Eyes closed firm: norm sway 0.48-0.99  Eyes open foam: norm sway 0.38-0.71  Eyes closed foam: norm sway 0.70-2.22   6 Minute Walk Test  MDC: 190.98 ft  MCID: 164 ft    Age Norms  60-69: M - 1876 ft (571.80 m)  F - 1765 ft (537.98 m)  70-79: M - 1729 ft (527.00 m)  F - 1545 ft (470.92 m)  80-89: M - 1368 ft (416.97 m)  F - 1286 ft (391.97 m) ABC: Yves & Soto, 2003  <67% increased risk for falls   [place] Monofilaments  Evaluator Size:        Force (grams):          Hand/Dorsal Thresholds:        Plantar Thresholds:  - 1.65                       - 0.008                       - Normal                                 - Normal  - 2.36                       - 0.02                         - Normal                                 - Normal  - 2.44                       - 0.04                         - Normal                                 - Normal  - 2.83                       - 0.07                         - Normal                                 - Normal  - 3.22                       - 0.16                         - Diminished light touch          - Normal  - 3.61                       - 0.40                         - Diminished light touch          - Normal  - 3.84                       - 0.60                         - Diminished protective           - Diminished light touch  - 4.08                       - 1.00                         - Diminished protective            - Diminished light touch  - 4.17                       - 1.40                         - Diminished protective           - Diminished light touch  - 4.31                       - 2.00                         - Diminished protective           - Diminished light touch  - 4.56                       - 4.00                         - Loss of protective sense      - Diminished protective  - 4.74                       - 6.00                         - Loss of protective sense      - Diminished protective  - 4.93                       - 8.00                         - Loss of protective sense      - Diminished protective  - 5.07                       - 10.0                         - Loss of protective sense     - Loss of protective sense  - 5.18                       - 15.0                         - Loss of protective sense     - Loss of protective sense  - 5.46                       - 26.0                         - Loss of protective sense     - Loss of protective sense  - 5.88                       - 60.0                         - Loss of protective sense     - Loss of protective sense  - 6.10                       - 100                          - Loss of protective sense     - Loss of protective sense  - 6.45                       - 180                          - Loss of protective sense     - Loss of protective sense  - 6.65                       - 300                          - Deep pressure sense only  - Deep pressure sense only       Subjective    History of Present Illness  - Mechanism of injury: Meningioma s/p L frontoparietal craniotomy (2020) resulting in residual R-sided heaviness, weakness (LE > UE), numbness, and tingling. Patient arrives with primary complaint of lack of toe movement (absent for past year) and excessive supination. Shares it has worsened in recent months.     Update 2/27/25: Denies falls or changes in past month. Got a new script for a brace due to issues with current one with noted issues  with toes curling still.     Update 3/27/25: Reports PT feels about the same. Feels that her walking capabilities. Feels that that she would benefit from doing some mat table exercises. Feels that would be beneficial for her strength.  When her tone is bad and her toes want to curl, that really impacts her ability to walk.     Update 4/29/2025: Reports feeling tight and off balance today.  Toes curl during session but she is able to adapt to maintain good balance this session.    - Primary AD: narrow base QC  - Assist level at home: mod IND (needs assistance with R AFO don)  - Decreased fine motor tasks: No    Patient goal:   Get the toes moving again  Reduce inversion/supination  Reduce heaviness in R LE    Pain  - Location: R-sided LBP  - Aggravating factors: rotation    Social Support  - Steps to enter house: 1 VIOLET  - Stairs in house: 13 with R HR, inc performance time  - Lives in: [facility]  - Lives with:  and son    - Employment status: retired retail sales  - Hand dominance: R-handed    Treatments  - Previous treatment: outpatient PT  - Current treatment: neurology  - Diagnostic Testing: see chart      Objective     LE MMT (not tested today)   - R Hip Flexion: 3+/5   L Hip Flexion: 4-/5  - R Hip Extension: 3+/5  L Hip Extension: 4-/5  - R Hip Abduction: 4-/5  L Hip Abduction: 4-/5  - R Hip Adduction: 4-/5  L Hip Adduction: 4-/5  - R Knee Extension: 4+/5  L Knee Extension: 4+/5  - R Knee Flexion: 3+/5  L Knee Flexion: 4+/5  - R Ankle DF: 0/5   L Ankle DF: 4+/5  - R Ankle PF: 2/5   L Ankle PF: 4+/5    Sensation (NT)   - Light touch: wfl  - Deep pressure: wfl    Coordination (NT)   - Heel to Shin: defer  - Alternate Toe Taps: defer  - Finger to Nose: slightly dysmetric on R, improved accuracy with reduced speed    Reflexes/Clonus (NT)   - Clonus: No    Modified Marlo (NT)  - R knee extension: 2 - easily moved, but more marked tone throughout most ROM   - L knee extension: 0 - no increase in tone  - R knee  flexion: 1 - catch and release or minimum resistance at end range    - L knee flexion: 0 - no increase in tone  - R ankle DF: 1 - catch and release or minimum resistance at end range     - L ankle DF: 0 - no increase in tone  - R ankle inversion: 1 - catch and release or minimum resistance at end range    - L ankle inversion: 0 - no increase in tone  - R ankle eversion: 1+ - catch followed by minimum resistance throughout remainder (< half ROM) - L ankle eversion: 0 - no increase in tone           Outcome Measures Initial Eval  12/30 1/30 2/27/25 3/27/25  4/29/25    5xSTS 16.95 sec no UE, 5/7 reps (2 failed due to insufficient ant WS and poor R foot placement) 11.30s no UE assist, continued difficulty with anterior WS 10.61s no UE A  9.5s no UE  15s, no UE    TUG  - Regular     19.98 sec with QC     15.77s with QC   16.06 sec    14.09s QC  19s WBQC    10 meter 0.39 m/s with QC 0.53 m/s with QC .51` m/s w QC 0.48 m/s w QC  0.52 m/s    TRIMBLE NV 36/56 37/56 42/56 48/56    FGA defer defer       DGI defer defer       mCTSIB  - FTEO (firm)  - FTEC (firm)  - FTEO (foam)  - FTEC (foam)   defer   defer   30 sec   30 sec   3 sec   LOB    30 sec  30 sec  30  22 seconds 30 sec all positions    6MWT  ft 700 ft  660 ft  600 ft    ABC 45% 43.13%  58.13%  57.5%                    Daily Treatment   NMR  FOM   SLR supine 2x10 ea   SLR s/l 2x10 ea   Supine bridge 2x10 ea     Precautions: epilepsy, hx meningioma, arthritis, pneumonia     Past Medical History:   Diagnosis Date    Arthritis     L shoulder    Brain compression (HCC) 9/11/2020    Hypercholesteremia     Pneumonia

## 2025-05-01 ENCOUNTER — OFFICE VISIT (OUTPATIENT)
Age: 75
End: 2025-05-01
Payer: MEDICARE

## 2025-05-01 DIAGNOSIS — D32.9 BENIGN MENINGIOMA (HCC): Primary | ICD-10-CM

## 2025-05-01 DIAGNOSIS — R29.898 RIGHT ARM WEAKNESS: ICD-10-CM

## 2025-05-01 DIAGNOSIS — M21.371 RIGHT FOOT DROP: ICD-10-CM

## 2025-05-01 PROCEDURE — 97112 NEUROMUSCULAR REEDUCATION: CPT | Performed by: PHYSICAL THERAPIST

## 2025-05-01 NOTE — PROGRESS NOTES
"Daily Note     Today's date: 2025  Patient name: Izabela Moeller  : 1950  MRN: [id number]  Referring provider: Marcie Parson PA-C  Dx:   Encounter Diagnosis     ICD-10-CM    1. Benign meningioma (HCC)  D32.9       2. Right foot drop  M21.371       3. Right arm weakness  R29.898           Start Time: 1630  Stop Time: 1715  Total time in clinic (min): 45 minutes    Subjective: Pt feels good today.  Had brace adjusted, feels good so far.     Objective: See treatment diary below    NMR:   - Neural priming treadmill walk fwd: 1MPH 10 mins, incline 0-4%  - Treadmill bwd walk 0.5 MPH: 5 mins  - STS with 3s controlled decent 10# TT 2x10  - Lateral stepping UUE carry 7.5# TT, HHA 4x 50' laps  - Farmer carry UUE 7.5# TT & HHA, 2x25'    Not today:  -supine R foot up and down mat table with focusing on keeping knee flexed with manual resistance 20 reps followed by another 10 reps. Total reps 30   - Amb around clinic with SBQC 120 feet x 1   - Amb HHA around clinic 2 laps 240 feet   - Amb around clinic no  feet   - high knee  on  step with 1 foot on 6\" step and other leg going from ground to top of following step (12\") 30 reps R side    Assessment: Tolerated treatment well.  Patient participated in skilled PT session focused HIGT.  Did well with M walking at 4% incline, recommend increasing speed NV.  Backwards walking speed increased to 0.7MPH, with good pt response and no LOB.  Spt was challenged with STS with 3s descent to chair, requiring 2 min rest after each set. Patient would continue to benefit from skilled PT interventions to address deficits with balance, gait, and endurance. Patient demonstrated fatigue post treatment      Plan: Continue per plan of care.             POC expires Unit limit Auth Expiration date PT/OT + Visit Limit? Co-Insurance   3/30/25 N/a pend BOMN Yes                               Visit/Unit Tracking  - PN  "   2/18 2/20 2/25 2/27 - PN 3/4 3/6 3/11 3/13 3/18 3/20 3/25  3/27 -RE   4/1 4/3 4/8 4/10 4/15 4/17 4/22 4/24 4/29- PN 5/1

## 2025-05-05 ENCOUNTER — OFFICE VISIT (OUTPATIENT)
Age: 75
End: 2025-05-05
Payer: MEDICARE

## 2025-05-05 DIAGNOSIS — D32.9 BENIGN MENINGIOMA (HCC): Primary | ICD-10-CM

## 2025-05-05 DIAGNOSIS — R29.898 RIGHT ARM WEAKNESS: ICD-10-CM

## 2025-05-05 DIAGNOSIS — M21.371 RIGHT FOOT DROP: ICD-10-CM

## 2025-05-05 PROCEDURE — 97112 NEUROMUSCULAR REEDUCATION: CPT | Performed by: PHYSICAL THERAPIST

## 2025-05-05 NOTE — PROGRESS NOTES
"Daily Note     Today's date: 2025  Patient name: Izabela Moeller  : 1950  MRN: [id number]  Referring provider: Marcie Parson PA-C  Dx:   Encounter Diagnosis     ICD-10-CM    1. Benign meningioma (HCC)  D32.9       2. Right foot drop  M21.371       3. Right arm weakness  R29.898             Start Time: 1630  Stop Time: 1715  Total time in clinic (min): 45 minutes    Subjective: Pt feels good today.  Had brace adjusted, feels good so far.     Objective: See treatment diary below    NMR:   - Neural priming treadmill walk fwd: 1.2 MPH 10 mins, incline 0-4%  - Treadmill bwd walk 0.7 MPH: 5 mins  - STS with 3s controlled decent 10# TT 2x10  - STS with TB green pad under R foot 2x10  - Farmer carry UUE 7.5# TT & HHA, 200' laps ea side    Not today:  -supine R foot up and down mat table with focusing on keeping knee flexed with manual resistance 20 reps followed by another 10 reps. Total reps 30   - Amb around clinic with SBQC 120 feet x 1   - Amb HHA around clinic 2 laps 240 feet   - Amb around clinic no  feet   - high knee  on  step with 1 foot on 6\" step and other leg going from ground to top of following step (12\") 30 reps R side    Assessment: Tolerated treatment well.  Patient participated in skilled PT session focused HIGT.  Fwd walking increased to 1.2MPH & backward walking speed increased to 0.7MPH, with good pt response and no LOB and 7/10 RPE.  Spt was challenged with STS with 3s descent to chair, requiring 2 min rest after each set. Did STS with R foot on green TB pad to promote force generation through the LE. Patient would continue to benefit from skilled PT interventions to address deficits with balance, gait, and endurance. Patient demonstrated fatigue post treatment      Plan: Continue per plan of care.             POC expires Unit limit Auth Expiration date PT/OT + Visit Limit? Co-Insurance   3/30/25 N/a pend BOMN Yes                               Visit/Unit " Tracking  12/30 1/7 1/9 1/14 1/16 1/21 1/28 1/30- PN 2/4 2/6 2/11 2/13 2/18 2/20 2/25 2/27 - PN 3/4 3/6 3/11 3/13 3/18 3/20 3/25  3/27 -RE   4/1 4/3 4/8 4/10 4/15 4/17 4/22 4/24 4/29- PN 5/1 5/5

## 2025-05-08 ENCOUNTER — OFFICE VISIT (OUTPATIENT)
Age: 75
End: 2025-05-08
Payer: MEDICARE

## 2025-05-08 DIAGNOSIS — R29.898 RIGHT ARM WEAKNESS: ICD-10-CM

## 2025-05-08 DIAGNOSIS — D32.9 BENIGN MENINGIOMA (HCC): Primary | ICD-10-CM

## 2025-05-08 DIAGNOSIS — M21.371 RIGHT FOOT DROP: ICD-10-CM

## 2025-05-08 PROCEDURE — 97112 NEUROMUSCULAR REEDUCATION: CPT | Performed by: PHYSICAL THERAPIST

## 2025-05-08 NOTE — PROGRESS NOTES
"Daily Note     Today's date: 2025  Patient name: Izabela Moeller  : 1950  MRN: [id number]  Referring provider: Marcie Parson PA-C  Dx:   Encounter Diagnosis     ICD-10-CM    1. Benign meningioma (HCC)  D32.9       2. Right foot drop  M21.371       3. Right arm weakness  R29.898               Start Time: 1631  Stop Time: 1717  Total time in clinic (min): 46 minutes    Subjective: Pt feels good today.  She is getting botox injection next Monday to address spasticity in leg.    Objective: See treatment diary below    NMR:   - Neural priming treadmill walk fwd: 1.5 MPH 10 mins, incline 0-6%  - Treadmill bwd walk 1.0 MPH: 5 mins  - STS with 3s controlled decent 10# TT 2x10  - STS with TB green pad under R foot 2x10  - Squats at the short // bar x10  - 3x 30s calf stretch ea side    Not today:  -supine R foot up and down mat table with focusing on keeping knee flexed with manual resistance 20 reps followed by another 10 reps. Total reps 30   - Amb around clinic with SBQC 120 feet x 1   - Amb HHA around clinic 2 laps 240 feet   - Amb around clinic no  feet   - high knee  on  step with 1 foot on 6\" step and other leg going from ground to top of following step (12\") 30 reps R side    Assessment: Tolerated treatment well.  Patient participated in skilled PT session focused HIGT.  Fwd walking increased to 1.5 with 6% incline & backward walking speed increased to 1 MPH, with good pt response and no LOB and 7/10 RPE.  Incorporated calf stretch active rest after TM today, good response with calf feeling \"less tight\".  Pt request to try squats at the end of the session today, use of UE to pull to stand and control descent, recommend staying with STS exercise to promote use of LE muscles.  Patient would continue to benefit from skilled PT interventions to address deficits with balance, gait, and endurance. Patient demonstrated fatigue post treatment      Plan: Continue per plan of care.         "     POC expires Unit limit Auth Expiration date PT/OT + Visit Limit? Co-Insurance   3/30/25 N/a pend BOMN Yes                               Visit/Unit Tracking  12/30 1/7 1/9 1/14 1/16 1/21 1/28 1/30- PN 2/4 2/6 2/11 2/13 2/18 2/20 2/25 2/27 - PN 3/4 3/6 3/11 3/13 3/18 3/20 3/25  3/27 -RE   4/1 4/3 4/8 4/10 4/15 4/17 4/22 4/24 4/29- PN 5/1 5/5 5/8

## 2025-05-13 ENCOUNTER — OFFICE VISIT (OUTPATIENT)
Age: 75
End: 2025-05-13
Payer: MEDICARE

## 2025-05-13 DIAGNOSIS — R29.898 RIGHT ARM WEAKNESS: ICD-10-CM

## 2025-05-13 DIAGNOSIS — D32.9 BENIGN MENINGIOMA (HCC): Primary | ICD-10-CM

## 2025-05-13 DIAGNOSIS — M21.371 RIGHT FOOT DROP: ICD-10-CM

## 2025-05-13 PROCEDURE — 97112 NEUROMUSCULAR REEDUCATION: CPT | Performed by: PHYSICAL THERAPIST

## 2025-05-13 NOTE — PROGRESS NOTES
"Daily Note     Today's date: 2025  Patient name: Izabela Moeller  : 1950  MRN: [id number]  Referring provider: Marcie Parson PA-C  Dx:   Encounter Diagnosis     ICD-10-CM    1. Benign meningioma (HCC)  D32.9       2. Right foot drop  M21.371       3. Right arm weakness  R29.898                 Start Time: 1632  Stop Time: 1715  Total time in clinic (min): 43 minutes    Subjective: Pt feels good today.  She got botox yesterday and feels fine, not noticing much difference yet.    Objective: See treatment diary below    NMR:   - Neural priming treadmill walk fwd: 1.5 MPH 10 mins, incline 0-8%  - TM walk 2.0 MPH, 0% incline 5 mins  - Treadmill bwd walk 1.0 MPH: 5 mins  - STS with 3s controlled decent 10# TT 2x10  - 3x 30s calf stretch ea side    Not today:  -supine R foot up and down mat table with focusing on keeping knee flexed with manual resistance 20 reps followed by another 10 reps. Total reps 30   - Amb around clinic with SBQC 120 feet x 1   - Amb HHA around clinic 2 laps 240 feet   - Amb around clinic no  feet   - high knee  on  step with 1 foot on 6\" step and other leg going from ground to top of following step (12\") 30 reps R side    Assessment: Tolerated treatment well.  Patient participated in skilled PT session focused HIGT.  Fwd walking increased to 2.0 with 8% incline & backward walking speed increased to 1 MPH, with good pt response and no LOB and 7-8/10 RPE.  Incorporated calf stretch active rest after TM today, good response with calf feeling \"less tight\".  Patient would continue to benefit from skilled PT interventions to address deficits with balance, gait, and endurance. Patient demonstrated fatigue post treatment      Plan: Continue per plan of care.             POC expires Unit limit Auth Expiration date PT/OT + Visit Limit? Co-Insurance   3/30/25 N/a pend BOMN Yes                               Visit/Unit Tracking  - PN " 2/4 2/6 2/11 2/13 2/18 2/20 2/25 2/27 - PN 3/4 3/6 3/11 3/13 3/18 3/20 3/25  3/27 -RE   4/1 4/3 4/8 4/10 4/15 4/17 4/22 4/24 4/29- PN 5/1 5/5 5/8 5/13

## 2025-05-15 ENCOUNTER — APPOINTMENT (OUTPATIENT)
Age: 75
End: 2025-05-15
Payer: MEDICARE

## 2025-05-20 ENCOUNTER — OFFICE VISIT (OUTPATIENT)
Age: 75
End: 2025-05-20
Payer: MEDICARE

## 2025-05-20 DIAGNOSIS — R29.898 RIGHT ARM WEAKNESS: ICD-10-CM

## 2025-05-20 DIAGNOSIS — M21.371 RIGHT FOOT DROP: ICD-10-CM

## 2025-05-20 DIAGNOSIS — D32.9 BENIGN MENINGIOMA (HCC): Primary | ICD-10-CM

## 2025-05-20 PROCEDURE — 97112 NEUROMUSCULAR REEDUCATION: CPT | Performed by: PHYSICAL THERAPIST

## 2025-05-20 NOTE — PROGRESS NOTES
"Daily Note     Today's date: 2025  Patient name: Izabela Moeller  : 1950  MRN: [id number]  Referring provider: Marcie Parson PA-C  Dx:   Encounter Diagnosis     ICD-10-CM    1. Benign meningioma (HCC)  D32.9       2. Right foot drop  M21.371       3. Right arm weakness  R29.898             Start Time: 1630  Stop Time: 1715  Total time in clinic (min): 45 minutes    Subjective: Pt feels good today.  Feels like leg is looser since the botox injections.    Objective: See treatment diary below    NMR:   - 4x large stairwell reciprocating gait, 1UE  - STS with 3s controlled decent 10# TT 2x10  - side stepping 3x 50'  - walking 4x 150' laps    Not today:  -supine R foot up and down mat table with focusing on keeping knee flexed with manual resistance 20 reps followed by another 10 reps. Total reps 30   - Amb around clinic with SBQC 120 feet x 1   - Amb HHA around clinic 2 laps 240 feet   - Amb around clinic no  feet   - high knee  on  step with 1 foot on 6\" step and other leg going from ground to top of following step (12\") 30 reps R side    Assessment: Tolerated treatment well.  Patient participated in skilled PT session focused HIGT.  Performed large stairwell today with UUE on rail ascending and  UUE + HHA descending with reciprocal steps.  Pt did well with stairs, showing increased knee flexion afterwards for the remainder of the session. Patient would continue to benefit from skilled PT interventions to address deficits with balance, gait, and endurance. Patient demonstrated fatigue post treatment      Plan: Continue per plan of care.             POC expires Unit limit Auth Expiration date PT/OT + Visit Limit? Co-Insurance   3/30/25 N/a pend BOMN Yes                               Visit/Unit Tracking  - PN 2/4 2 - PN 3/ 3/6 3/11 3/13 3/18 3/20 3/25  3/27 -RE   4/1 4/3 4/8 4/10 4/15 4/17 4/22 4/24 4/29- PN " 5/1 5/5 5/8 5/13 5/20

## 2025-05-22 ENCOUNTER — OFFICE VISIT (OUTPATIENT)
Age: 75
End: 2025-05-22
Payer: MEDICARE

## 2025-05-22 DIAGNOSIS — R29.898 RIGHT ARM WEAKNESS: ICD-10-CM

## 2025-05-22 DIAGNOSIS — D32.9 BENIGN MENINGIOMA (HCC): Primary | ICD-10-CM

## 2025-05-22 DIAGNOSIS — M21.371 RIGHT FOOT DROP: ICD-10-CM

## 2025-05-22 PROCEDURE — 97112 NEUROMUSCULAR REEDUCATION: CPT

## 2025-05-22 NOTE — PROGRESS NOTES
"Daily Note     Today's date: 2025  Patient name: Izabela Moeller  : 1950  MRN: [id number]  Referring provider: Marcie Parson PA-C  Dx:   Encounter Diagnosis     ICD-10-CM    1. Benign meningioma (HCC)  D32.9       2. Right foot drop  M21.371       3. Right arm weakness  R29.898               Start Time: 1630  Stop Time: 1715  Total time in clinic (min): 45 minutes    Subjective: Pt feelt sore after last session, but it went away within a day.  Feels good today.    Objective: See treatment diary below    NMR:   - 4x large stairwell reciprocating gait, 1UE  - Walking no UE spt, close '  - TM walk 0% incline 2.0 mph to fatigue x2: 5 mins, 6:03 mins    Not today:  -supine R foot up and down mat table with focusing on keeping knee flexed with manual resistance 20 reps followed by another 10 reps. Total reps 30   - Amb around clinic with SBQC 120 feet x 1   - Amb HHA around clinic 2 laps 240 feet   - Amb around clinic no  feet   - high knee  on  step with 1 foot on 6\" step and other leg going from ground to top of following step (12\") 30 reps R side    Assessment: Tolerated treatment well.  Patient participated in skilled PT session focused HIGT.  Performed large stairwell today with UUE on rail ascending and  UUE + HHA descending with reciprocal steps.  Pt did well with stairs, showing increased knee flexion afterwards for the remainder of the session. Performed walking with no UE spt, pt walks slowly, but is able to complete it with close SBA.  Ended on TM walking at 2.0 MPH to fatigue.  Patient would continue to benefit from skilled PT interventions to address deficits with balance, gait, and endurance. Patient demonstrated fatigue post treatment      Plan: Continue per plan of care.             POC expires Unit limit Auth Expiration date PT/OT + Visit Limit? Co-Insurance   3/30/25 N/a pend BOMN Yes                               Visit/Unit Tracking   1 114 16 " 1/21 1/28 1/30- PN 2/4 2/6 2/11 2/13 2/18 2/20 2/25 2/27 - PN 3/4 3/6 3/11 3/13 3/18 3/20 3/25  3/27 -RE   4/1 4/3 4/8 4/10 4/15 4/17 4/22 4/24 4/29- PN 5/1 5/5 5/8 5/13 5/20

## 2025-05-27 ENCOUNTER — OFFICE VISIT (OUTPATIENT)
Age: 75
End: 2025-05-27
Payer: MEDICARE

## 2025-05-27 DIAGNOSIS — M21.371 RIGHT FOOT DROP: ICD-10-CM

## 2025-05-27 DIAGNOSIS — R29.898 RIGHT ARM WEAKNESS: ICD-10-CM

## 2025-05-27 DIAGNOSIS — D32.9 BENIGN MENINGIOMA (HCC): Primary | ICD-10-CM

## 2025-05-27 PROCEDURE — 97530 THERAPEUTIC ACTIVITIES: CPT | Performed by: PHYSICAL THERAPIST

## 2025-05-27 PROCEDURE — 97112 NEUROMUSCULAR REEDUCATION: CPT | Performed by: PHYSICAL THERAPIST

## 2025-05-27 NOTE — PROGRESS NOTES
"Daily Note     Today's date: 2025  Patient name: Izabela Moeller  : 1950  MRN: [id number]  Referring provider: Marcie Parson PA-C  Dx:   Encounter Diagnosis     ICD-10-CM    1. Benign meningioma (HCC)  D32.9       2. Right foot drop  M21.371       3. Right arm weakness  R29.898                 Start Time: 1630  Stop Time: 1715  Total time in clinic (min): 45 minutes    Subjective: Pt legs were sore after last session, but it went away within 2 days.  Feels good today, but shoes are too big and she has been catching toes on both sides occasionally.  Size of shoe purchased to accommodate brace.    Objective: See treatment diary below    NMR:   - 4x large stairwell reciprocating gait, 1UE  - Walking SPC '  - TM walk 0% incline 2.0 mph to fatigue: 6 mins    At ballet bar:  -lateral/fwd walking over hurdles (6\" and 9\" alternating) UUE 5 laps ea     Not today:  -supine R foot up and down mat table with focusing on keeping knee flexed with manual resistance 20 reps followed by another 10 reps. Total reps 30   - Amb around clinic with SBQC 120 feet x 1   - Amb HHA around clinic 2 laps 240 feet   - Amb around clinic no  feet   - high knee  on  step with 1 foot on 6\" step and other leg going from ground to top of following step (12\") 30 reps R side    Assessment: Tolerated treatment well.  Patient participated in skilled PT session focused HIGT.  Performed large stairwell today with UUE on rail both ascending and descending.  TM walking immediately after stairs to fatigue; pt tolerated 6 mins with RPE 7-8/10.  Reincorporated hurdles today to work on balance and limb clearance.  Pt education to look for shoes that fit to avoid a tripping hazard caused by excess toe length in the shoes.  Patient would continue to benefit from skilled PT interventions to address deficits with balance, gait, and endurance. Patient demonstrated fatigue post treatment      Plan: Continue per plan of " care.             POC expires Unit limit Auth Expiration date PT/OT + Visit Limit? Co-Insurance   3/30/25 N/a pend BOMN Yes                               Visit/Unit Tracking  12/30 1/7 1/9 1/14 1/16 1/21 1/28 1/30- PN 2/4 2/6 2/11 2/13 2/18 2/20 2/25 2/27 - PN 3/4 3/6 3/11 3/13 3/18 3/20 3/25  3/27 -RE   4/1 4/3 4/8 4/10 4/15 4/17 4/22 4/24 4/29- PN 5/1 5/5 5/8 5/13 5/20 5/22 5/27

## 2025-05-29 ENCOUNTER — EVALUATION (OUTPATIENT)
Age: 75
End: 2025-05-29
Payer: MEDICARE

## 2025-05-29 DIAGNOSIS — D32.9 BENIGN MENINGIOMA (HCC): Primary | ICD-10-CM

## 2025-05-29 DIAGNOSIS — R29.898 RIGHT ARM WEAKNESS: ICD-10-CM

## 2025-05-29 DIAGNOSIS — M21.371 RIGHT FOOT DROP: ICD-10-CM

## 2025-05-29 PROCEDURE — 97112 NEUROMUSCULAR REEDUCATION: CPT

## 2025-05-29 NOTE — PROGRESS NOTES
PT Re-Evaluation / Progress Note            POC expires Unit limit Auth Expiration date PT/OT + Visit Limit? Co-Insurance   3/30/25 N/a pend BOMN Yes    25                                                 Visit/Unit Tracking  - PN 2/4 2/6  - PN 3/4 3/6 3/11 3/13 3/18 3/20 3/25  3/27 -RE   4/1 4/3 4/8 4/10 4/15 4/17 4/22 4/24 4/29- PN  PN                   Today's date: 2025  Patient name: Izabela Moeller  : 1950  MRN: [id number]  Referring provider: Marcie Parson PA-C  Dx:   Encounter Diagnosis     ICD-10-CM    1. Benign meningioma (HCC)  D32.9       2. Right foot drop  M21.371       3. Right arm weakness  R29.898               Assessment  Assessment details: Patient is a 74 y.o. Female who presents to skilled outpatient PT with impaired mobility with RLE hemiparesis 2/2 brain tumor removal in . Pt presents with associated impairments, including RLE synergy patterning, abnormal gait pattern, reduced gait speed, reduced LE strength/power, impaired functional mobility, and fall risk. Currently, Utilizing quad cane and AFO with household and community ambulation and has been very steady over the past month. Pt has goal of increased toe movement/strength, improved balance, ambulation without R AFO, and reduced spasticity. Has since received botox injections (50% dosage) 2025 and did receive new AFO in previous month. Pend patient response to medication, will receive full injection in Aug. Since previous PN, patient shows mild improvements with below MDC this date for test and measures in 5xSTS, 10mWT, and 6MWT indicating enhanced functional transfers, mobility, and gait speed. MDC made on TUG indicating clinical significant improvement. Remains at HIGH risk of falls per 10mWT and TUG. No longer considered at HIGH  risk of falls per TRIMBLE. Despite improvements made this date compared to last RE (4/29), scores remain similar to previous months. However, due to recent Botox Injection, can trial skilled PT for additional 1 month to assess for improvements. Pt will benefit from skilled PT interventions for gait training, balance training, neuromuscular reeducation, strengthening, endurance training, and functional task practice to assist pt in improving current level of mobility and increasing QOL. Educated patient extensively on PT implication and limitations, PT POC, importance of inc physical activity, need for AFO to reduce foot/toe drag (inc risk of falls), f/u with neurology for spasticity management, and Botox injections. Has met 5/5 STG and is progressing towards completion of LTGs. Continue with POC through 6/27/25 with long term goals remaining appropriate at this time. Will see if Botox injections contribute to improvements; if scores remain similar at next RE, patient will be DC to comprehensive HEP. Patient verbalized understanding of PT POC and in agreement.     Outcome Measures Initial Eval  12/30 1/30 2/27/25 3/27/25  4/29/25 5/29   5xSTS 16.95 sec no UE, 5/7 reps (2 failed due to insufficient ant WS and poor R foot placement) 11.30s no UE assist, continued difficulty with anterior WS 10.61s no UE A  9.5s no UE  15s, no UE 14.06s no UE   TUG  - Regular     19.98 sec with QC     15.77s with QC   16.06 sec    14.09s QC  19s WBQC   15.50s w WBQC   10 meter 0.39 m/s with QC 0.53 m/s with QC .51` m/s w QC 0.48 m/s w QC  0.52 m/s 0.62 m/s w WBQC   TRIMBLE NV 36/56 37/56 42/56 48/56 48/56   FGA defer defer       DGI defer defer       mCTSIB  - FTEO (firm)  - FTEC (firm)  - FTEO (foam)  - FTEC (foam)   defer   defer   30 sec   30 sec   3 sec   LOB    30 sec  30 sec  30  22 seconds 30 sec all positions   Defer    6MWT  ft 700 ft  660 ft  600 ft 680 ft w WBCQ   ABC 45% 43.13%  58.13%  57.5% 56.25%     Impairments: Abnormal  coordination, Abnormal gait, Abnormal muscle tone, Abnormal or restricted ROM, Activity intolerance, Impaired balance, Impaired physical strength, Lacks appropriate HEP, Poor posture, Poor body mechanics, Pain with function, Safety issue, Weight-bearing intolerance, Abnormal movement, Difficulty understanding, Abnormal muscle firing  Understanding of Dx/Px/POC: Good  Prognosis: Good    Patient verbalized understanding of POC.         Please contact me if you have any questions or recommendations. Thank you for the referral and the opportunity to share in Izabela Raymondwilfredohusam's care.        Plan  Plan details:   Patient would benefit from: Skilled PT  Planned modality interventions: Biofeedback, Cryotherapy, TENS, Thermotherapy  Planned therapy interventions: Abdominal trunk stabilization, ADL training, Balance, Balance/WB training, Breathing training, Body mechanics training, Coordination, Functional ROM exercises, Gait training, HEP, Joint Mobilization, Manual Therapy, Brock taping, Motor coordination training, Neuromuscular re-education, Patient education, Postural training, Strengthening, Stretching, Therapeutic activities, Therapeutic exercises, Therapeutic training, Transfer training, Activity modification, Work reintegration  Frequency: 1-2x/wk  Duration in weeks: 12 weeks  Plan of Care beginning date: 3/27/25  Plan of Care expiration date: 3 months - 6/27/25  Treatment plan discussed with: Patient       Goals  Short Term Goals (4 weeks):    - Patient will improve time on TUG by 2.9 seconds to facilitate improved safety in all ambulation met  - Patient will be independent in basic HEP 2-3 weeks met  - Patient will improve 5xSTS score by 2.3 seconds to promote improved LE functional strength needed for ADLs met  - Patient will perform TRIMBLE met  - Patient will perform 6MWT met    Long Term Goals (12 weeks):  - Patient will be independent in a comprehensive home exercise program Progressing   - Patient will  improve gait speed by 0.18 m/s to improve safety with community ambulation Progressing (.14m/s)  - Patient will improve TRIMBLE by 6 points in order to improve static balance and reduce risk for falls Met  - Patient will improve 6 Minute Walk Test score by 190 feet to promote improved cardiovascular endurance Progressing   - Patient will report 50% reduction in near falls in order to improve safety with functional tasks and reduce his risk for falls Met   - Patient will report going on walks at least 3 days per week to promote independence and improved cardiovascular endurance Met   - Patient will be able to ascend/descend stairs reciprocally with 1 UE assist to promote independence and safety with ADLs Progressing   - Patient will report 50% reduction in near falls when ambulating on uneven terrain Met       Cut off score    All date taken from [facility] Neuro Section or Rehab Measures      Trimble/56  MDC: 6 pts  Age Norms:  60-69: M - 55   F - 55  70-79: M - 54   F - 53  80-89: M - 53   F - 50 5xSTS: Tod et al 2010  MDC: 2.3 sec  Age Norms:  60-69: 11.4 sec  70-79: 12.6 sec  80-89: 14.8 sec   TUG  MDC: 4.14 sec  Cut off score:  >13.5 sec community dwelling adults  >32.2 frail elderly  <20 I for basic transfers  >30 dependent on transfers 10 Meter Walk Test: Annamaria et al 2011  MDC: 0.18 m/s  20-29: M - 1.35 m   F - 1.34 m  30-39: M - 1.43 m   F - 1.34 m  40-49: M - 1.43 m   F - 1.39 m  50-59: M - 1.43 m   F - 1.31 m  60-69: M - 1.34 m   F - 1.24 m  70-79: M - 1.26 m   F - 1.13 m  80-89: M - 0.97 m   F - 0.94 m    Household Ambulator < 0.4 m/s  Limited Community Ambulator 0.4 - 0.8 m/s  Community Ambulator 0.8 - 1.2 m/s  Safely cross the street > 1.2 m/s   FGA  MCID: 4 pts  Geriatrics/community < 22/30 fall risk  Geriatrics/community < 20/30 unexplained falls    DGI  MDC: vestibular - 4 pts  MDC: geriatric/community - 3 pts  Falls risk <19/24 mCTSIB  Norm: 20-60 yrs  Eyes open firm: norm sway 0.21-0.48  Eyes  closed firm: norm sway 0.48-0.99  Eyes open foam: norm sway 0.38-0.71  Eyes closed foam: norm sway 0.70-2.22   6 Minute Walk Test  MDC: 190.98 ft  MCID: 164 ft    Age Norms  60-69: M - 1876 ft (571.80 m)  F - 1765 ft (537.98 m)  70-79: M - 1729 ft (527.00 m)  F - 1545 ft (470.92 m)  80-89: M - 1368 ft (416.97 m)  F - 1286 ft (391.97 m) ABC: Yves & Soto, 2003  <67% increased risk for falls   [place] Monofilaments  Evaluator Size:        Force (grams):          Hand/Dorsal Thresholds:        Plantar Thresholds:  - 1.65                       - 0.008                       - Normal                                 - Normal  - 2.36                       - 0.02                         - Normal                                 - Normal  - 2.44                       - 0.04                         - Normal                                 - Normal  - 2.83                       - 0.07                         - Normal                                 - Normal  - 3.22                       - 0.16                         - Diminished light touch          - Normal  - 3.61                       - 0.40                         - Diminished light touch          - Normal  - 3.84                       - 0.60                         - Diminished protective           - Diminished light touch  - 4.08                       - 1.00                         - Diminished protective           - Diminished light touch  - 4.17                       - 1.40                         - Diminished protective           - Diminished light touch  - 4.31                       - 2.00                         - Diminished protective           - Diminished light touch  - 4.56                       - 4.00                         - Loss of protective sense      - Diminished protective  - 4.74                       - 6.00                         - Loss of protective sense      - Diminished protective  - 4.93                       - 8.00                          - Loss of protective sense      - Diminished protective  - 5.07                       - 10.0                         - Loss of protective sense     - Loss of protective sense  - 5.18                       - 15.0                         - Loss of protective sense     - Loss of protective sense  - 5.46                       - 26.0                         - Loss of protective sense     - Loss of protective sense  - 5.88                       - 60.0                         - Loss of protective sense     - Loss of protective sense  - 6.10                       - 100                          - Loss of protective sense     - Loss of protective sense  - 6.45                       - 180                          - Loss of protective sense     - Loss of protective sense  - 6.65                       - 300                          - Deep pressure sense only  - Deep pressure sense only       Subjective    History of Present Illness  - Mechanism of injury: Meningioma s/p L frontoparietal craniotomy (2020) resulting in residual R-sided heaviness, weakness (LE > UE), numbness, and tingling. Patient arrives with primary complaint of lack of toe movement (absent for past year) and excessive supination. Shares it has worsened in recent months.     Update 2/27/25: Denies falls or changes in past month. Got a new script for a brace due to issues with current one with noted issues with toes curling still.     Update 3/27/25: Reports PT feels about the same. Feels that her walking capabilities. Feels that that she would benefit from doing some mat table exercises. Feels that would be beneficial for her strength.  When her tone is bad and her toes want to curl, that really impacts her ability to walk.     Update 4/29/2025: Reports feeling tight and off balance today.  Toes curl during session but she is able to adapt to maintain good balance this session.    Update 5/29/2025: Patient denies falls in past month. Reports same:  endurance, balance, strength. Botox injections received on 5/12 (approx 2.5 weeks ago); will see  in July for updates, with possible full injection in August 2025.     - Primary AD: narrow base QC  - Assist level at home: mod IND (needs assistance with R AFO don)  - Decreased fine motor tasks: No    Patient goal:   Get the toes moving again  Reduce inversion/supination  Reduce heaviness in R LE    Pain  - Location: R-sided LBP  - Aggravating factors: rotation    Social Support  - Steps to enter house: 1 VIOLET  - Stairs in house: 13 with R HR, inc performance time  - Lives in: [facility]  - Lives with:  and son    - Employment status: retired retail sales  - Hand dominance: R-handed    Treatments  - Previous treatment: outpatient PT  - Current treatment: neurology  - Diagnostic Testing: see chart      Objective     LE MMT (not tested today)   - R Hip Flexion: 3+/5   L Hip Flexion: 4-/5  - R Hip Extension: 3+/5  L Hip Extension: 4-/5  - R Hip Abduction: 4-/5  L Hip Abduction: 4-/5  - R Hip Adduction: 4-/5  L Hip Adduction: 4-/5  - R Knee Extension: 4+/5  L Knee Extension: 4+/5  - R Knee Flexion: 3+/5  L Knee Flexion: 4+/5  - R Ankle DF: 0/5   L Ankle DF: 4+/5  - R Ankle PF: 2/5   L Ankle PF: 4+/5    Sensation (NT)   - Light touch: wfl  - Deep pressure: wfl    Coordination (NT)   - Heel to Shin: defer  - Alternate Toe Taps: defer  - Finger to Nose: slightly dysmetric on R, improved accuracy with reduced speed    Reflexes/Clonus (NT)   - Clonus: No    Modified Marlo (NT)  - R knee extension: 2 - easily moved, but more marked tone throughout most ROM   - L knee extension: 0 - no increase in tone  - R knee flexion: 1 - catch and release or minimum resistance at end range    - L knee flexion: 0 - no increase in tone  - R ankle DF: 1 - catch and release or minimum resistance at end range     - L ankle DF: 0 - no increase in tone  - R ankle inversion: 1 - catch and release or minimum resistance at end range    - L ankle  inversion: 0 - no increase in tone  - R ankle eversion: 1+ - catch followed by minimum resistance throughout remainder (< half ROM) - L ankle eversion: 0 - no increase in tone           Outcome Measures Initial Eval  12/30 1/30 2/27/25 3/27/25  4/29/25 5/29   5xSTS 16.95 sec no UE, 5/7 reps (2 failed due to insufficient ant WS and poor R foot placement) 11.30s no UE assist, continued difficulty with anterior WS 10.61s no UE A  9.5s no UE  15s, no UE 14.06s no UE   TUG  - Regular     19.98 sec with QC     15.77s with QC   16.06 sec    14.09s QC  19s WBQC   15.50s w WBQC   10 meter 0.39 m/s with QC 0.53 m/s with QC .51` m/s w QC 0.48 m/s w QC  0.52 m/s 0.62 m/s w WBQC   TRIMBLE NV 36/56 37/56 42/56 48/56 48/56   FGA defer defer       DGI defer defer       mCTSIB  - FTEO (firm)  - FTEC (firm)  - FTEO (foam)  - FTEC (foam)   defer   defer   30 sec   30 sec   3 sec   LOB    30 sec  30 sec  30  22 seconds 30 sec all positions   Defer    6MWT  ft 700 ft  660 ft  600 ft 680 ft w WBCQ   ABC 45% 43.13%  58.13%  57.5% 56.25%                       Precautions: epilepsy, hx meningioma, arthritis, pneumonia     Past Medical History:   Diagnosis Date    Arthritis     L shoulder    Brain compression (HCC) 9/11/2020    Hypercholesteremia     Pneumonia

## 2025-06-03 ENCOUNTER — OFFICE VISIT (OUTPATIENT)
Age: 75
End: 2025-06-03
Payer: MEDICARE

## 2025-06-03 DIAGNOSIS — M21.371 RIGHT FOOT DROP: ICD-10-CM

## 2025-06-03 DIAGNOSIS — D32.9 BENIGN MENINGIOMA (HCC): Primary | ICD-10-CM

## 2025-06-03 DIAGNOSIS — R29.898 RIGHT ARM WEAKNESS: ICD-10-CM

## 2025-06-03 PROCEDURE — 97112 NEUROMUSCULAR REEDUCATION: CPT

## 2025-06-03 NOTE — PROGRESS NOTES
"Daily Note     Today's date: 6/3/2025  Patient name: Izabela Moeller  : 1950  MRN: [id number]  Referring provider: Marcie Parson PA-C  Dx:   Encounter Diagnosis     ICD-10-CM    1. Benign meningioma (HCC)  D32.9       2. Right foot drop  M21.371       3. Right arm weakness  R29.898                  Subjective: Pt     Objective: See treatment diary below    NMR:   - 4x large stairwell reciprocating gait, 1UE  - Walking SPC CS-'x2  - TM walk 0% incline 1.9-2.0 mph to fatigue: 10 mins    At // bar:  - Fwd/bwd amb no UE support, 4 laps  - March with 3s iso, req UUE 4 laps    TA - HEP for R foot  Access Code: [email or web address]      Not today:  -supine R foot up and down mat table with focusing on keeping knee flexed with manual resistance 20 reps followed by another 10 reps. Total reps 30   - Amb around clinic with SBQC 120 feet x 1   - Amb HHA around clinic 2 laps 240 feet   - Amb around clinic no  feet   - high knee  on  step with 1 foot on 6\" step and other leg going from ground to top of following step (12\") 30 reps R side  -lateral/fwd walking over hurdles (6\" and 9\" alternating) UUE 5 laps ea       Assessment: Tolerated treatment well.  Patient participated in skilled PT session focused HIGT.  Performed large stairwell today with UUE on rail both ascending and descending.  TM walking immediately after stairs to fatigue; pt tolerated 10 mins with RPE 7-8/10.  Pt education to look for shoes that fit to avoid a tripping hazard caused by excess toe length in the shoes.  Patient would continue to benefit from skilled PT interventions to address deficits with balance, gait, and endurance. Patient demonstrated fatigue post treatment      Plan: Continue per plan of care.             POC expires Unit limit Auth Expiration date PT/OT + Visit Limit? Co-Insurance   3/30/25 N/a pend BOMN Yes   25                            Visit/Unit Tracking  - PN  " 2/11 2/13 2/18 2/20 2/25 2/27 - PN 3/4 3/6 3/11 3/13 3/18 3/20 3/25  3/27 -RE   4/1 4/3 4/8 4/10 4/15 4/17 4/22 4/24 4/29- PN 5/1 5/5 5/8 5/13 5/20 5/22 5/27 5/29 PN 6/3

## 2025-06-05 ENCOUNTER — OFFICE VISIT (OUTPATIENT)
Age: 75
End: 2025-06-05
Payer: MEDICARE

## 2025-06-05 DIAGNOSIS — M21.371 RIGHT FOOT DROP: ICD-10-CM

## 2025-06-05 DIAGNOSIS — R29.898 RIGHT ARM WEAKNESS: ICD-10-CM

## 2025-06-05 DIAGNOSIS — D32.9 BENIGN MENINGIOMA (HCC): Primary | ICD-10-CM

## 2025-06-05 PROCEDURE — 97112 NEUROMUSCULAR REEDUCATION: CPT

## 2025-06-05 NOTE — PROGRESS NOTES
"Daily Note     Today's date: 2025  Patient name: zIabela Moeller  : 1950  MRN: [id number]  Referring provider: Marcie Parson PA-C  Dx:   Encounter Diagnosis     ICD-10-CM    1. Benign meningioma (HCC)  D32.9       2. Right foot drop  M21.371       3. Right arm weakness  R29.898         Start Time: 1630  Stop Time: 1716  Total time in clinic (min): 46 minutes    Subjective: Pt     Objective: See treatment diary below    NMR:   - 4x large stairwell reciprocating gait, 1UE  - Walking SPC CS-'x2  - Walking no ' continuous  - STS 7.5# TT, 3s descent 2x10    At // bar:  - Fwd/bwd amb no UE support, 4 laps  - March with 3s iso, req UUE 4 laps    TA - HEP for R foot  Access Code: [email or web address]      Not today:  - TM walk 0% incline 1.9-2.0 mph to fatigue: 10 mins  -supine R foot up and down mat table with focusing on keeping knee flexed with manual resistance 20 reps followed by another 10 reps. Total reps 30   - Amb around clinic with SBQC 120 feet x 1   - Amb HHA around clinic 2 laps 240 feet   - Amb around clinic no  feet   - high knee  on  step with 1 foot on 6\" step and other leg going from ground to top of following step (12\") 30 reps R side  -lateral/fwd walking over hurdles (6\" and 9\" alternating) UUE 5 laps ea       Assessment: Tolerated treatment well.  Patient participated in skilled PT session focused HIGT.  Performed large stairwell today with UUE on rail both ascending and descending.  Focused on walking without AD today with no LOB but slow gait speed (SBA throughout). Recommend re-incorporating TM training next session.  Patient would continue to benefit from skilled PT interventions to address deficits with balance, gait, and endurance. Patient demonstrated fatigue post treatment      Plan: Continue per plan of care.             POC expires Unit limit Auth Expiration date PT/OT + Visit Limit? Co-Insurance   3/30/25 N/a pend BOMN Yes   25                        "     Visit/Unit Tracking  12/30 1/7 1/9 1/14 1/16 1/21 1/28 1/30- PN 2/4 2/6 2/11 2/13 2/18 2/20 2/25 2/27 - PN 3/4 3/6 3/11 3/13 3/18 3/20 3/25  3/27 -RE   4/1 4/3 4/8 4/10 4/15 4/17 4/22 4/24 4/29- PN 5/1 5/5 5/8 5/13 5/20 5/22 5/27 5/29 PN 6/3 6/5

## 2025-06-10 ENCOUNTER — OFFICE VISIT (OUTPATIENT)
Age: 75
End: 2025-06-10
Payer: MEDICARE

## 2025-06-10 DIAGNOSIS — D32.9 BENIGN MENINGIOMA (HCC): Primary | ICD-10-CM

## 2025-06-10 DIAGNOSIS — M21.371 RIGHT FOOT DROP: ICD-10-CM

## 2025-06-10 DIAGNOSIS — R29.898 RIGHT ARM WEAKNESS: ICD-10-CM

## 2025-06-10 PROCEDURE — 97112 NEUROMUSCULAR REEDUCATION: CPT

## 2025-06-10 NOTE — PROGRESS NOTES
Daily Note     Today's date: 6/10/2025  Patient name: Izabela Moeller  : 1950  MRN: [id number]  Referring provider: Marcie Parson PA-C  Dx:   Encounter Diagnosis     ICD-10-CM    1. Benign meningioma (HCC)  D32.9       2. Right foot drop  M21.371       3. Right arm weakness  R29.898                    Subjective: Pt denies falls or change in status.    Objective: See treatment diary below    NMR:   - 4x large stairwell reciprocating gait, 1UE  - STS 7.5# TT, 3s descent 2x10  - Speed amb no AD, light L 'x1, CGA  - TM walk 1% incline 2.0 mph to fatigue: 5 mins      Assessment: Tolerated treatment well.  Patient participated in skilled PT session focused HIGT.  Performed large stairwell today with UUE on rail both ascending and descending.  Focused on walking without AD today with no LOB but slow gait speed (SBA throughout). TM provides forced rate ambulation for increased intensity. Recommend re-incorporating TM training next session.  Patient would continue to benefit from skilled PT interventions to address deficits with balance, gait, and endurance. Patient demonstrated fatigue post treatment      Plan: Continue per plan of care.             POC expires Unit limit Auth Expiration date PT/OT + Visit Limit? Co-Insurance   3/30/25 N/a pend BOMN Yes   25                            Visit/Unit Tracking  - PN 2/4 2/ - PN 3/4 3/6 3/11 3/13 3/18 3/20 3/25  3/27 -RE    4/3 4/8 4/10 4/15 4/17 4/22 4/24 4/29- PN  PN 6/3 6/5 6/10

## 2025-06-12 ENCOUNTER — OFFICE VISIT (OUTPATIENT)
Age: 75
End: 2025-06-12
Payer: MEDICARE

## 2025-06-12 DIAGNOSIS — M21.371 RIGHT FOOT DROP: ICD-10-CM

## 2025-06-12 DIAGNOSIS — R29.898 RIGHT ARM WEAKNESS: ICD-10-CM

## 2025-06-12 DIAGNOSIS — D32.9 BENIGN MENINGIOMA (HCC): Primary | ICD-10-CM

## 2025-06-12 PROCEDURE — 97112 NEUROMUSCULAR REEDUCATION: CPT

## 2025-06-12 NOTE — PROGRESS NOTES
Daily Note     Today's date: 2025  Patient name: Izabela Moeller  : 1950  MRN: [id number]  Referring provider: Marcie Parson PA-C  Dx:   Encounter Diagnosis     ICD-10-CM    1. Benign meningioma (HCC)  D32.9       2. Right foot drop  M21.371       3. Right arm weakness  R29.898                    Subjective: Pt denies falls or change in status.    Objective: See treatment diary below    NMR:   - 4x large stairwell reciprocating gait, 1UE   PT assist at hips to promote inc R hip flexion  - TM walk 1% incline 2.0 mph to fatigue: 6' > seated rest > 5' (11' total)  - STS 7.5# TT, 3s descent 2x10    Assessment: Tolerated treatment well.  Patient participated in skilled PT session focused HIGT.  Performed large stairwell today with UUE on rail both ascending and descending.  Focused on walking without AD today with no LOB but slow gait speed (SBA throughout). TM provides forced rate ambulation for increased intensity. Recommend re-incorporating TM training next session.  Patient would continue to benefit from skilled PT interventions to address deficits with balance, gait, and endurance. Patient demonstrated fatigue post treatment      Plan: Continue per plan of care.             POC expires Unit limit Auth Expiration date PT/OT + Visit Limit? Co-Insurance   3/30/25 N/a pend BOMN Yes   25                            Visit/Unit Tracking  - PN 2/4 2 - PN 3/4 3/6 3/11 3/13 3/18 3/20 3/25  3/27 -RE    4/3 4/8 4/10 4/15 4/17 4/22 4/24 4/29- PN  PN 6/3 6/5 6/10 6/12

## 2025-06-17 ENCOUNTER — OFFICE VISIT (OUTPATIENT)
Age: 75
End: 2025-06-17
Payer: MEDICARE

## 2025-06-17 DIAGNOSIS — R29.898 RIGHT ARM WEAKNESS: ICD-10-CM

## 2025-06-17 DIAGNOSIS — M21.371 RIGHT FOOT DROP: ICD-10-CM

## 2025-06-17 DIAGNOSIS — D32.9 BENIGN MENINGIOMA (HCC): Primary | ICD-10-CM

## 2025-06-17 PROCEDURE — 97112 NEUROMUSCULAR REEDUCATION: CPT

## 2025-06-17 NOTE — PROGRESS NOTES
Daily Note     Today's date: 2025  Patient name: Izabela Moeller  : 1950  MRN: [id number]  Referring provider: Marcie Parson PA-C  Dx:   Encounter Diagnosis     ICD-10-CM    1. Benign meningioma (HCC)  D32.9       2. Right foot drop  M21.371       3. Right arm weakness  R29.898                    Subjective: Pt denies falls or change in status.    Objective: See treatment diary below    NMR:     - NuStep lvl 4 x 10 min, LE only, SPM > 100 to promote neurogenesis     - TM amb   1% incline 2.0 mph 5' > 3% incline 1.6 mph 5'     - STS 7.5# TT, 3s descent 2x12    Assessment: Tolerated treatment well.  Patient participated in skilled PT session focused HIGT.  Unable to perform stair negotiation this date. Focused on walking without AD today with no LOB but slow gait speed (SBA throughout). TM provides forced rate ambulation for increased intensity. Patient would continue to benefit from skilled PT interventions to address deficits with balance, gait, and endurance. Patient demonstrated fatigue post treatment      Plan: Continue per plan of care.             POC expires Unit limit Auth Expiration date PT/OT + Visit Limit? Co-Insurance   3/30/25 N/a pend BOMN Yes   25                            Visit/Unit Tracking  - PN 2/4 2/6  - PN 3/4 3/6 3/11 3/13 3/18 3/20 3/25  3/27 -RE    4/3 4/8 4/10 4/15 4/17 4/22 4/24 4/29- PN / PN 6/3 6/5 6/10 6/12 6/17

## 2025-06-19 ENCOUNTER — OFFICE VISIT (OUTPATIENT)
Age: 75
End: 2025-06-19
Payer: MEDICARE

## 2025-06-19 DIAGNOSIS — D32.9 BENIGN MENINGIOMA (HCC): Primary | ICD-10-CM

## 2025-06-19 DIAGNOSIS — R29.898 RIGHT ARM WEAKNESS: ICD-10-CM

## 2025-06-19 DIAGNOSIS — M21.371 RIGHT FOOT DROP: ICD-10-CM

## 2025-06-19 PROCEDURE — 97112 NEUROMUSCULAR REEDUCATION: CPT

## 2025-06-19 NOTE — PROGRESS NOTES
"Daily Note     Today's date: 2025  Patient name: Izabela Moeller  : 1950  MRN: [id number]  Referring provider: Marcie Parson PA-C  Dx:   Encounter Diagnosis     ICD-10-CM    1. Benign meningioma (HCC)  D32.9       2. Right foot drop  M21.371       3. Right arm weakness  R29.898                    Subjective: Pt denies falls or change in status.    Objective: See treatment diary below    NMR:     - NuStep lvl 4 x 10 min, LE only, SPM > 100 to promote neurogenesis    - Stool taps with L HHA x20/ea    - Fwd step over 6\" bethanie alt LE, x20/ea with L HHA    - Lat step over 6\" bethanie x20/ea with UL support on // bars    Unable to tolerate HHA 2/2 increased tone of L LE     - TM amb   1% incline 2.0 mph 10'     Assessment: Tolerated treatment well.  Patient participated in skilled PT session focused HIGT and balance activities. TM provides forced rate ambulation for increased intensity. Noted increased L ankle inversion tone this date compared to previous, which negatively impacts stability and mobility. Patient would continue to benefit from skilled PT interventions to address deficits with balance, gait, and endurance. Patient demonstrated fatigue post treatment      Plan: Continue per plan of care.             POC expires Unit limit Auth Expiration date PT/OT + Visit Limit? Co-Insurance   3/30/25 N/a pend BOMN Yes   25                            Visit/Unit Tracking  - PN 2/4 2/ - PN 3/4 3/6 3/11 3/13 3/18 3/20 3/25  3/27 -RE   4/ 4/3 4/8 4/10 4/15 4/17 4/22 4/24 4/29- PN 5/ 5/ 5/ PN 6/3 6/ 6/10 6/12 6/17 6/19      "

## 2025-06-24 ENCOUNTER — OFFICE VISIT (OUTPATIENT)
Age: 75
End: 2025-06-24
Payer: MEDICARE

## 2025-06-24 DIAGNOSIS — D32.9 BENIGN MENINGIOMA (HCC): Primary | ICD-10-CM

## 2025-06-24 DIAGNOSIS — M21.371 RIGHT FOOT DROP: ICD-10-CM

## 2025-06-24 DIAGNOSIS — R29.898 RIGHT ARM WEAKNESS: ICD-10-CM

## 2025-06-24 PROCEDURE — 97112 NEUROMUSCULAR REEDUCATION: CPT | Performed by: PHYSICAL THERAPIST

## 2025-06-24 PROCEDURE — 97164 PT RE-EVAL EST PLAN CARE: CPT | Performed by: PHYSICAL THERAPIST

## 2025-06-24 PROCEDURE — 97530 THERAPEUTIC ACTIVITIES: CPT | Performed by: PHYSICAL THERAPIST

## 2025-06-24 NOTE — PROGRESS NOTES
PT Re-Evaluation             POC expires Unit limit Auth Expiration date PT/OT + Visit Limit? Co-Insurance   3/30/25 N/a pend BOMN Yes    25                                     Visit/Unit Tracking  - PN 2/4 2/ - PN 3/4 3/6 3/11 3/13 3/18 3/20 3/25  3/27 -RE   4/1 4/3 4/8 4/10 4/15 4/17 4/22 4/24 4/29- PN  PN 6/3 6/5 6/10 6/12 6/17 6/19 6/24- RE            Today's date: 2025  Patient name: Izabela Moeller  : 1950  MRN: [id number]  Referring provider: Marcie Parson PA-C  Dx:   Encounter Diagnosis     ICD-10-CM    1. Benign meningioma (HCC)  D32.9       2. Right foot drop  M21.371       3. Right arm weakness  R29.898               Assessment  Assessment details: Patient is a 74 y.o. Female who presents to skilled outpatient PT with impaired mobility with RLE hemiparesis 2/2 brain tumor removal in . Pt presents with associated impairments, including RLE synergy patterning, abnormal gait pattern, reduced gait speed, reduced LE strength/power, impaired functional mobility, and fall risk. Currently, Utilizing quad cane and AFO with household and community ambulation and has been very steady over the past month. Pt has goal of increased toe movement/strength, improved balance, ambulation without R AFO, and reduced spasticity. Has since received botox injections (50% dosage) 2025 and did receive new AFO with continued adjustments with most recent adjustment on 2025. Pend patient response to medication, will receive full injection in Aug. Since previous PN, patient shows slight regression secondary to heat which is common with dx.  Remains at HIGH risk of falls per 10mWT and TUG. No longer considered at HIGH risk of falls per TRIMBLE. However, due to recent Botox Injection and recent brace  adjustment will continue for another 2 weeks to monitor response.  Pt will benefit from skilled PT interventions for gait training, balance training, neuromuscular reeducation, strengthening, endurance training, and functional task practice to assist pt in improving current level of mobility and increasing QOL. Educated patient extensively on PT implication and limitations, PT POC, importance of inc physical activity, need for AFO to reduce foot/toe drag (inc risk of falls), f/u with neurology for spasticity management, and Botox injections. Has met 5/5 STG and is progressing towards completion of LTGs. Continue with POC through 9/24/25 with re-assessment in 2 weeks to determine if continue therapy is needed.        Impairments: Abnormal coordination, Abnormal gait, Abnormal muscle tone, Abnormal or restricted ROM, Activity intolerance, Impaired balance, Impaired physical strength, Lacks appropriate HEP, Poor posture, Poor body mechanics, Pain with function, Safety issue, Weight-bearing intolerance, Abnormal movement, Difficulty understanding, Abnormal muscle firing  Understanding of Dx/Px/POC: Good  Prognosis: Good    Patient verbalized understanding of POC.    Please contact me if you have any questions or recommendations. Thank you for the referral and the opportunity to share in Izabela Sunni's care.        Plan  Plan details:   Patient would benefit from: Skilled PT  Planned modality interventions: Biofeedback, Cryotherapy, TENS, Thermotherapy  Planned therapy interventions: Abdominal trunk stabilization, ADL training, Balance, Balance/WB training, Breathing training, Body mechanics training, Coordination, Functional ROM exercises, Gait training, HEP, Joint Mobilization, Manual Therapy, Brock taping, Motor coordination training, Neuromuscular re-education, Patient education, Postural training, Strengthening, Stretching, Therapeutic activities, Therapeutic exercises, Therapeutic training, Transfer  training, Activity modification, Work reintegration  Frequency: 1-2x/wk  Duration in weeks: 12 weeks  Plan of Care beginning date: 2025  Plan of Care expiration date: 3 months - 2025  Treatment plan discussed with: Patient       Goals  Short Term Goals (4 weeks):    - Patient will improve time on TUG by 2.9 seconds to facilitate improved safety in all ambulation met  - Patient will be independent in basic HEP 2-3 weeks met  - Patient will improve 5xSTS score by 2.3 seconds to promote improved LE functional strength needed for ADLs met  - Patient will perform TRIMBLE met  - Patient will perform 6MWT met    Long Term Goals (12 weeks):  - Patient will be independent in a comprehensive home exercise program Progressing   - Patient will improve gait speed by 0.18 m/s to improve safety with community ambulation Progressing (.14m/s)  - Patient will improve TRIMBLE by 6 points in order to improve static balance and reduce risk for falls Met  - Patient will improve 6 Minute Walk Test score by 190 feet to promote improved cardiovascular endurance Progressing   - Patient will report 50% reduction in near falls in order to improve safety with functional tasks and reduce his risk for falls Met   - Patient will report going on walks at least 3 days per week to promote independence and improved cardiovascular endurance Met   - Patient will be able to ascend/descend stairs reciprocally with 1 UE assist to promote independence and safety with ADLs Progressing   - Patient will report 50% reduction in near falls when ambulating on uneven terrain Met       Cut off score    All date taken from [facility] Neuro Section or Rehab Measures      Trimble/56  MDC: 6 pts  Age Norms:  60-69: M - 55   F - 55  70-79: M - 54   F - 53  80-89: M - 53   F - 50 5xSTS: Tod et al 2010  MDC: 2.3 sec  Age Norms:  60-69: 11.4 sec  70-79: 12.6 sec  80-89: 14.8 sec   TUG  MDC: 4.14 sec  Cut off score:  >13.5 sec community dwelling adults  >32.2 frail  elderly  <20 I for basic transfers  >30 dependent on transfers 10 Meter Walk Test: Harjit and Jude ragsdale al 2011  MDC: 0.18 m/s  20-29: M - 1.35 m   F - 1.34 m  30-39: M - 1.43 m   F - 1.34 m  40-49: M - 1.43 m   F - 1.39 m  50-59: M - 1.43 m   F - 1.31 m  60-69: M - 1.34 m   F - 1.24 m  70-79: M - 1.26 m   F - 1.13 m  80-89: M - 0.97 m   F - 0.94 m    Household Ambulator < 0.4 m/s  Limited Community Ambulator 0.4 - 0.8 m/s  Community Ambulator 0.8 - 1.2 m/s  Safely cross the street > 1.2 m/s   FGA  MCID: 4 pts  Geriatrics/community < 22/30 fall risk  Geriatrics/community < 20/30 unexplained falls    DGI  MDC: vestibular - 4 pts  MDC: geriatric/community - 3 pts  Falls risk <19/24 mCTSIB  Norm: 20-60 yrs  Eyes open firm: norm sway 0.21-0.48  Eyes closed firm: norm sway 0.48-0.99  Eyes open foam: norm sway 0.38-0.71  Eyes closed foam: norm sway 0.70-2.22   6 Minute Walk Test  MDC: 190.98 ft  MCID: 164 ft    Age Norms  60-69: M - 1876 ft (571.80 m)  F - 1765 ft (537.98 m)  70-79: M - 1729 ft (527.00 m)  F - 1545 ft (470.92 m)  80-89: M - 1368 ft (416.97 m)  F - 1286 ft (391.97 m) ABC: Yves & Valera, 2003  <67% increased risk for falls   [place] [employer]  Evaluator Size:        Force (grams):          Hand/Dorsal Thresholds:        Plantar Thresholds:  - 1.65                       - 0.008                       - Normal                                 - Normal  - 2.36                       - 0.02                         - Normal                                 - Normal  - 2.44                       - 0.04                         - Normal                                 - Normal  - 2.83                       - 0.07                         - Normal                                 - Normal  - 3.22                       - 0.16                         - Diminished light touch          - Normal  - 3.61                       - 0.40                         - Diminished light touch          - Normal  - 3.84                        - 0.60                         - Diminished protective           - Diminished light touch  - 4.08                       - 1.00                         - Diminished protective           - Diminished light touch  - 4.17                       - 1.40                         - Diminished protective           - Diminished light touch  - 4.31                       - 2.00                         - Diminished protective           - Diminished light touch  - 4.56                       - 4.00                         - Loss of protective sense      - Diminished protective  - 4.74                       - 6.00                         - Loss of protective sense      - Diminished protective  - 4.93                       - 8.00                         - Loss of protective sense      - Diminished protective  - 5.07                       - 10.0                         - Loss of protective sense     - Loss of protective sense  - 5.18                       - 15.0                         - Loss of protective sense     - Loss of protective sense  - 5.46                       - 26.0                         - Loss of protective sense     - Loss of protective sense  - 5.88                       - 60.0                         - Loss of protective sense     - Loss of protective sense  - 6.10                       - 100                          - Loss of protective sense     - Loss of protective sense  - 6.45                       - 180                          - Loss of protective sense     - Loss of protective sense  - 6.65                       - 300                          - Deep pressure sense only  - Deep pressure sense only       Subjective    History of Present Illness  - Mechanism of injury: Meningioma s/p L frontoparietal craniotomy (2020) resulting in residual R-sided heaviness, weakness (LE > UE), numbness, and tingling. Patient arrives with primary complaint of lack of toe movement (absent for past year) and  excessive supination. Shares it has worsened in recent months.     Update 2/27/25: Denies falls or changes in past month. Got a new script for a brace due to issues with current one with noted issues with toes curling still.     Update 3/27/25: Reports PT feels about the same. Feels that her walking capabilities. Feels that that she would benefit from doing some mat table exercises. Feels that would be beneficial for her strength.  When her tone is bad and her toes want to curl, that really impacts her ability to walk.     Update 4/29/2025: Reports feeling tight and off balance today.  Toes curl during session but she is able to adapt to maintain good balance this session.    Update 5/29/2025: Patient denies falls in past month. Reports same: endurance, balance, strength. Botox injections received on 5/12 (approx 2.5 weeks ago); will see  in July for updates, with possible full injection in August 2025.     Update 6/24/2025:  Pt reports having a harder time today due to the heat and swelling in the right leg. Had adjustment to brace today which seems to feel a little better per pt.     - Primary AD: narrow base QC  - Assist level at home: mod IND (needs assistance with R AFO don)  - Decreased fine motor tasks: No    Patient goal:   Get the toes moving again  Reduce inversion/supination  Reduce heaviness in R LE    Pain  - Location: R-sided LBP  - Aggravating factors: rotation    Social Support  - Steps to enter house: 1 VIOLET  - Stairs in house: 13 with R HR, inc performance time  - [facility] in: [facility]  - Lives with:  and son    - Employment status: retired retail sales  - Hand dominance: R-handed    Treatments  - Previous treatment: outpatient PT  - Current treatment: neurology  - Diagnostic Testing: see chart      Objective     LE MMT (seated MMT) updated 6/24/25  - R Hip Flexion: 4-/5   - L Hip Flexion: 5/5  - R Hip Extension: 5/5   - L Hip Extension: 5/5  - R Hip Abduction: 4/5   - L Hip abduction: 4/5  - R Hip  adduction: 3+/5  - L Hip adduction: 4+/5  - R Knee Extension: 3+/5  - L Knee Extension: 4+/5  - R Knee Flexion: 3+/5  - L Knee Flexion: 5/5  - R Ankle DF: AFO   - L Ankle DF: 5/5  - R Ankle PF: AFO   - L Ankle PF: 5/5    Sensation updated 6/24/25  - Light touch: wfl  - Deep pressure: wfl    Coordination: 6/24/2025  - Heel to Shin: unable   - Alternate Toe Taps: unable   - Finger to Nose: slightly dysmetric on R, improved accuracy with reduced speed    Reflexes/Clonus (NT)   - Clonus: No    Modified Marlo Updated: 6/24/2025  - R knee extension: 2 - easily moved, but more marked tone throughout most ROM   - L knee extension: 0 - no increase in tone  - R knee flexion: 1 - catch and release or minimum resistance at end range    - L knee flexion: 0 - no increase in tone  - R ankle DF: 1 - catch and release or minimum resistance at end range     - L ankle DF: 0 - no increase in tone  - R ankle inversion: 1 - catch and release or minimum resistance at end range    - L ankle inversion: 0 - no increase in tone  - R ankle eversion: 1+ - catch followed by minimum resistance throughout remainder (< half ROM) - L ankle eversion: 0 - no increase in tone           Outcome Measures Initial Eval  12/30 1/30 2/27/25 3/27/25  4/29/25 5/29 6/24/25   5xSTS 16.95 sec no UE, 5/7 reps (2 failed due to insufficient ant WS and poor R foot placement) 11.30s no UE assist, continued difficulty with anterior WS 10.61s no UE A  9.5s no UE  15s, no UE 14.06s no UE 16.10s no UE   TUG  - Regular     19.98 sec with QC     15.77s with QC   16.06 sec    14.09s QC  19s WBQC   15.50s w SBQC   17.75sec w SBQC   10 meter 0.39 m/s with QC 0.53 m/s with QC .51` m/s w QC 0.48 m/s w QC  0.52 m/s 0.62 m/s w SBQC .65 m/s w SBQC   TRIMBLE NV 36/56 37/56 42/56 48/56 48/56    FGA defer defer        DGI defer defer        mCTSIB  - FTEO (firm)  - FTEC (firm)  - FTEO (foam)  - FTEC (foam)   defer   defer   30 sec   30 sec   3 sec   LOB    30 sec  30 sec  30  22  seconds 30 sec all positions   Defer  30 sec all positions   6MWT  ft 700 ft  660 ft  600 ft 680 ft w SBCQ 600ft w SBQC   ABC 45% 43.13%  58.13%  57.5% 56.25%                         Precautions: epilepsy, hx meningioma, arthritis, pneumonia     Past Medical History:   Diagnosis Date    Arthritis     L shoulder    Brain compression (HCC) 9/11/2020    Hypercholesteremia     Pneumonia

## 2025-06-26 ENCOUNTER — OFFICE VISIT (OUTPATIENT)
Age: 75
End: 2025-06-26
Payer: MEDICARE

## 2025-06-26 DIAGNOSIS — M21.371 RIGHT FOOT DROP: ICD-10-CM

## 2025-06-26 DIAGNOSIS — D32.9 BENIGN MENINGIOMA (HCC): Primary | ICD-10-CM

## 2025-06-26 DIAGNOSIS — R29.898 RIGHT ARM WEAKNESS: ICD-10-CM

## 2025-06-26 PROCEDURE — 97112 NEUROMUSCULAR REEDUCATION: CPT

## 2025-06-26 NOTE — PROGRESS NOTES
Daily Note     Today's date: 2025  Patient name: Izabela Moeller  : 1950  MRN: [id number]  Referring provider: Marcie Parson PA-C  Dx:   Encounter Diagnosis     ICD-10-CM    1. Benign meningioma (HCC)  D32.9       2. Right foot drop  M21.371       3. Right arm weakness  R29.898                    Subjective: Pt denies falls or change in status.    Objective: See treatment diary below    NMR:     *doff AFO*    - STS with R ankle mob (A-P) 2 x 10    (No AFO)    - NuStep lvl 4 x 10 min, LE only, SPM > 100 to promote neurogenesis   (No AFO); seat 8     *don AFO*     - TM amb    1% incline 2.0 mph 10'     - Fwd / bwd amb in // bars, L UE support, 4 laps      Assessment: Tolerated treatment well.  Patient participated in skilled PT session focused HIGT and balance activities. TM provides forced rate ambulation for increased intensity. Noted increased L ankle inversion tone this date compared to previous, which negatively impacts stability and mobility. Patient would continue to benefit from skilled PT interventions to address deficits with balance, gait, and endurance. Patient demonstrated fatigue post treatment      Plan: Continue per plan of care.             POC expires Unit limit Auth Expiration date PT/OT + Visit Limit? Co-Insurance   3/30/25 N/a pend BOMN Yes   25                     Visit/Unit Tracking  - PN 2/4 2/6  - PN 3/4 3/6 3/11 3/13 3/18 3/20 3/25  3/27 -RE    4/3 4/8 4/10 4/15 4/17 4/22 4/24 4/29- PN  5 5 PN 6/3 6/ 6/10 6/12 6/17 6/19 6/24 PN

## 2025-07-01 ENCOUNTER — OFFICE VISIT (OUTPATIENT)
Age: 75
End: 2025-07-01
Payer: MEDICARE

## 2025-07-01 DIAGNOSIS — R29.898 RIGHT ARM WEAKNESS: ICD-10-CM

## 2025-07-01 DIAGNOSIS — D32.9 BENIGN MENINGIOMA (HCC): ICD-10-CM

## 2025-07-01 DIAGNOSIS — M21.371 RIGHT FOOT DROP: Primary | ICD-10-CM

## 2025-07-01 PROCEDURE — 97112 NEUROMUSCULAR REEDUCATION: CPT | Performed by: PHYSICAL THERAPIST

## 2025-07-01 NOTE — PROGRESS NOTES
"Daily Note     Today's date: 2025  Patient name: Izabela Moeller  : 1950  MRN: [id number]  Referring provider: Marcie Parson PA-C  Dx:   Encounter Diagnosis     ICD-10-CM    1. Right foot drop  M21.371       2. Right arm weakness  R29.898       3. Benign meningioma (HCC)  D32.9                    Subjective: Patient reports she has new shoes and AFO which fit well so far with next follow up with podiatrist in August. She states no frequent falls or LOB. She states she uses her quad cane for the majority of the time. Her ultimate goal is to get back to dancing (polka, walts)     Objective: See treatment diary below    NMR:     - STS with R ankle mob (A-P) 2 x 10    (No AFO)      - TM amb    1% incline 2.0 mph 10' with 5# AW first 5min then removed.     - Step ups on therapy steps 8\" with 5# AW with emphasis on hip flexion and recurvatum    -Ambulation 50' x 4 laps with 5# AW and 50' x 4 laps without AW with increased speed    Add: cone weaves or reactive stepping activity to address patient specific goals.      Not today:   -Fwd / bwd amb in // bars, L UE support, 4 laps  - NuStep lvl 4 x 10 min, LE only, SPM > 100 to promote neurogenesis   (No AFO); seat 8     Assessment: Tolerated treatment well. Began this session with DF MWM (without AFO) during STS with use of slant board to promote greater DF ROM during ambulation. TM provides forced rate ambulation for increased intensity with use of 5# ankle weights for error augmentation during the first 5min and removed the last 5min which improved hip flexion. Added step ups on 8\" step to promote hip flexion force production including emphasis on controlling R knee hyper extension. Ended with ambulation with ankle weight to improve hip flexion during gait with variation of increased speed to improve push off power. Patient would continue to benefit from skilled PT interventions to address deficits with balance, gait, and endurance.       Plan: Continue " per plan of care.             POC expires Unit limit Auth Expiration date PT/OT + Visit Limit? Co-Insurance   3/30/25 N/a pend BOMN Yes   6/27/25 9/24/25                     Visit/Unit Tracking  12/30 1/7 1/9 1/14 1/16 1/21 1/28 1/30- PN 2/4 2/6 2/11 2/13 2/18 2/20 2/25 2/27 - PN 3/4 3/6 3/11 3/13 3/18 3/20 3/25  3/27 -RE   4/1 4/3 4/8 4/10 4/15 4/17 4/22 4/24 4/29- PN 5/1 5/5 5/8 5/13 5/20 5/22 5/27 5/29 PN 6/3 6/5 6/10 6/12 6/17 6/19 6/24 PN   6/26 7/1

## 2025-07-03 ENCOUNTER — APPOINTMENT (OUTPATIENT)
Age: 75
End: 2025-07-03
Payer: MEDICARE

## 2025-07-08 ENCOUNTER — OFFICE VISIT (OUTPATIENT)
Age: 75
End: 2025-07-08
Payer: MEDICARE

## 2025-07-08 DIAGNOSIS — D32.9 BENIGN MENINGIOMA (HCC): ICD-10-CM

## 2025-07-08 DIAGNOSIS — R29.898 RIGHT ARM WEAKNESS: ICD-10-CM

## 2025-07-08 DIAGNOSIS — M21.371 RIGHT FOOT DROP: Primary | ICD-10-CM

## 2025-07-08 PROCEDURE — 97112 NEUROMUSCULAR REEDUCATION: CPT | Performed by: PHYSICAL THERAPIST

## 2025-07-08 NOTE — PROGRESS NOTES
"Daily Note     Today's date: 2025  Patient name: Izabela Moeller  : 1950  MRN: [id number]  Referring provider: Marcie Parson PA-C  Dx:   Encounter Diagnosis     ICD-10-CM    1. Right foot drop  M21.371       2. Right arm weakness  R29.898       3. Benign meningioma (HCC)  D32.9           Start Time: 0430  Stop Time: 0515  Total time in clinic (min): 45 minutes    Subjective: Patient reports she has occasionally wearing an ankle brace in her home to give her R foot a break from her new AFO. She brought it today to assess while walking.     Objective: See treatment diary below    NMR:     - STS with R ankle mob (A-P) 2 x 10    (No AFO)      - TM amb    1% incline 2.0 mph 10' with 5# AW first 5min then removed.     - Step ups on therapy steps 8\" with 5# AW with emphasis on hip flexion and recurvatum      Add: reactive stepping activity to address patient specific goals.      Not today:   - Ambulation 50' x 4 laps with 5# AW and 50' x 4 laps without AW with increased speed  -Fwd / bwd amb in // bars, L UE support, 4 laps  - NuStep lvl 4 x 10 min, LE only, SPM > 100 to promote neurogenesis   (No AFO); seat 8     Assessment: Tolerated treatment well. Began this session with DF MWM (without AFO) during STS with use of slant board to promote greater DF ROM during ambulation. Assessed patient with use of ankle brace during gait. She presents with increased gait abnormalities including greater foot drop and inversion which is unsafe compared to her AFO. Recommended continue use of AFO during gait for safety. Plan to add stepping reaction over hurdles to address balance deficits as per patient goals.       Plan: Continue per plan of care.             POC expires Unit limit Auth Expiration date PT/OT + Visit Limit? Co-Insurance   3/30/25 N/a pend BOMN Yes   25                     Visit/Unit Tracking  - PN 2/ - PN 3/4 " 3/6 3/11 3/13 3/18 3/20 3/25  3/27 -RE   4/1 4/3 4/8 4/10 4/15 4/17 4/22 4/24 4/29- PN 5/1 5/5 5/8 5/13 5/20 5/22 5/27 5/29 PN 6/3 6/5 6/10 6/12 6/17 6/19 6/24 PN   6/26 7/1 7/8

## 2025-07-10 ENCOUNTER — OFFICE VISIT (OUTPATIENT)
Age: 75
End: 2025-07-10
Payer: MEDICARE

## 2025-07-10 DIAGNOSIS — D32.9 BENIGN MENINGIOMA (HCC): ICD-10-CM

## 2025-07-10 DIAGNOSIS — R29.898 RIGHT ARM WEAKNESS: ICD-10-CM

## 2025-07-10 DIAGNOSIS — M21.371 RIGHT FOOT DROP: Primary | ICD-10-CM

## 2025-07-10 PROCEDURE — 97112 NEUROMUSCULAR REEDUCATION: CPT | Performed by: PHYSICAL THERAPIST

## 2025-07-10 NOTE — PROGRESS NOTES
"Daily Note     Today's date: 7/10/2025  Patient name: Izabela Moeller  : 1950  MRN: [id number]  Referring provider: Marcie Parson PA-C  Dx:   Encounter Diagnosis     ICD-10-CM    1. Right foot drop  M21.371       2. Right arm weakness  R29.898       3. Benign meningioma (HCC)  D32.9           Start Time: 0430  Stop Time: 0515  Total time in clinic (min): 45 minutes    Subjective:     Objective: See treatment diary below    NMR:     - STS with R ankle mob (A-P) 2 x 10    (No AFO)      - TM amb    1% incline 2.0 mph 10' with 5# AW first 5min then removed.     - Step ups on therapy steps 8\" with 5# AW with emphasis on hip flexion and recurvatum - NT    - Stairs 28 x 2. One set with 5# AW and one set without.    - Reactive stepping over 6\" bethanie 2 x 10 in parallel bars      Not today:   - Ambulation 50' x 4 laps with 5# AW and 50' x 4 laps without AW with increased speed  -Fwd / bwd amb in // bars, L UE support, 4 laps  - NuStep lvl 4 x 10 min, LE only, SPM > 100 to promote neurogenesis   (No AFO); seat 8     Assessment: Tolerated treatment well. Began this session with DF MWM (without AFO) during STS with use of slant board to promote greater DF ROM during ambulation. Added stair climb up and down 28 stairs reciprocally with R hand rail to reproduce home set up which patient tolerate well. Utilized ankle weight with and without each set of stair climb to improve hip flexion power and augment errors. Also added reactive stepping over a bethanie due to lack of confidence when nudged or walking in a busy environment. Will cont to progress as able.       Plan: Continue per plan of care.             POC expires Unit limit Auth Expiration date PT/OT + Visit Limit? Co-Insurance   3/30/25 N/a pend BOMN Yes   25                     Visit/Unit Tracking  - PN /4 2/6  - PN 3/4 3/6 3/11 3/13 3/18 3/20 3/25  3/27 -RE   4/1 4/3 4/8 " 4/10 4/15 4/17 4/22 4/24 4/29- PN 5/1 5/5 5/8 5/13 5/20 5/22 5/27 5/29 PN 6/3 6/5 6/10 6/12 6/17 6/19 6/24 PN   6/26 7/1 7/8

## 2025-07-15 ENCOUNTER — OFFICE VISIT (OUTPATIENT)
Age: 75
End: 2025-07-15
Payer: MEDICARE

## 2025-07-15 DIAGNOSIS — R29.898 RIGHT ARM WEAKNESS: ICD-10-CM

## 2025-07-15 DIAGNOSIS — M21.371 RIGHT FOOT DROP: Primary | ICD-10-CM

## 2025-07-15 DIAGNOSIS — D32.9 BENIGN MENINGIOMA (HCC): ICD-10-CM

## 2025-07-15 PROCEDURE — 97112 NEUROMUSCULAR REEDUCATION: CPT | Performed by: PHYSICAL THERAPIST

## 2025-07-17 ENCOUNTER — OFFICE VISIT (OUTPATIENT)
Age: 75
End: 2025-07-17
Payer: MEDICARE

## 2025-07-17 DIAGNOSIS — D32.9 BENIGN MENINGIOMA (HCC): ICD-10-CM

## 2025-07-17 DIAGNOSIS — M21.371 RIGHT FOOT DROP: Primary | ICD-10-CM

## 2025-07-17 DIAGNOSIS — R29.898 RIGHT ARM WEAKNESS: ICD-10-CM

## 2025-07-17 PROCEDURE — 97112 NEUROMUSCULAR REEDUCATION: CPT

## 2025-07-17 NOTE — PROGRESS NOTES
"Daily Note     Today's date: 2025  Patient name: Izabela Moeller  : 1950  MRN: [id number]  Referring provider: Marcie Parson PA-C  Dx:   Encounter Diagnosis     ICD-10-CM    1. Right foot drop  M21.371       2. Right arm weakness  R29.898       3. Benign meningioma (HCC)  D32.9               Start Time: 0430  Stop Time: 0515  Total time in clinic (min): 45 minutes    Subjective: Patient reports a new insidious onset of elbow pain over the past couple days with no INNA.    Objective: See treatment diary below    NMR:     - STS with R ankle mob (A-P) 2 x 10    (No AFO)     - NuStep lvl 4 x 10 min, LE only, SPM > 100 to promote neurogenesis   (No AFO); seat 8      - TM amb    1% incline 2.0 mph 10' with 5# AW first 5min then removed.     - Step ups on therapy steps 8\" with 5# AW with emphasis on hip flexion and recurvatum - NT    - Stairs 28 x 2. One set with 5# AW and one set without.     - Reactive stepping over 6\" bethanie 2 x 10 in parallel bars      Not today:   - - TM amb    1% incline 2.0 mph 10' with 5# AW first 5min then removed.   - Ambulation 50' x 4 laps with 5# AW and 50' x 4 laps without AW with increased speed  -Fwd / bwd amb in // bars, L UE support, 4 laps  - NuStep lvl 4 x 10 min, LE only, SPM > 100 to promote neurogenesis    (No AFO); seat 8     Assessment: Tolerated treatment well. Began this session with DF MWM (without AFO) during STS with use of slant board to promote greater DF ROM during ambulation f/b Nustep without AFO to further promote DF ROM and prime for walking. Patient was able to climb 28 stairs reciprocally ascend/descend with single hand rail in 1min 47sec indicating good power and speed compared to the past couple weeks. Plan to re assess next week and anticipate placing on hold if patient is reaching a plateau. Will cont to progress as able.       Plan: Continue per plan of care.             POC expires Unit limit Auth Expiration date PT/OT + Visit Limit? " Co-Insurance   3/30/25 N/a pend BOMN Yes   6/27/25 9/24/25                     Visit/Unit Tracking  12/30 1/7 1/9 1/14 1/16 1/21 1/28 1/30- PN 2/4 2/6 2/11 2/13 2/18 2/20 2/25 2/27 - PN 3/4 3/6 3/11 3/13 3/18 3/20 3/25  3/27 -RE   4/1 4/3 4/8 4/10 4/15 4/17 4/22 4/24 4/29- PN 5/1 5/5 5/8 5/13 5/20 5/22 5/27 5/29 PN 6/3 6/5 6/10 6/12 6/17 6/19 6/24 PN   6/26 7/1 7/8 7/15 7/17

## 2025-07-22 ENCOUNTER — EVALUATION (OUTPATIENT)
Age: 75
End: 2025-07-22
Payer: MEDICARE

## 2025-07-22 DIAGNOSIS — D32.9 BENIGN MENINGIOMA (HCC): ICD-10-CM

## 2025-07-22 DIAGNOSIS — M21.371 RIGHT FOOT DROP: Primary | ICD-10-CM

## 2025-07-22 DIAGNOSIS — R29.898 RIGHT ARM WEAKNESS: ICD-10-CM

## 2025-07-22 PROCEDURE — 97112 NEUROMUSCULAR REEDUCATION: CPT | Performed by: PHYSICAL THERAPIST

## 2025-07-22 NOTE — PROGRESS NOTES
PT Re-Evaluation             POC expires Unit limit Auth Expiration date PT/OT + Visit Limit? Co-Insurance   3/30/25 N/a pend BOMN Yes    25                                     Visit/Unit Tracking  - PN 2/4 2/ - PN 3/4 3/6 3/11 3/13 3/18 3/20 3/25  3/27 -RE    4/1 4/3 4/8 4/10 4/15 4/17 4/22 4/24 4/29- PN  PN 6/3 6/5 6/10 6/12 6/17 6/19 6/24- RE  RE            Today's date: 2025  Patient name: Izabela Moeller  : 1950  MRN: [id number]  Referring provider: Marcie Parson PA-C  Dx:   Encounter Diagnosis     ICD-10-CM    1. Right foot drop  M21.371       2. Right arm weakness  R29.898       3. Benign meningioma (HCC)  D32.9                 Assessment  Assessment details: Patient is a 75 y.o. Female who presents to skilled outpatient PT with impaired mobility with RLE hemiparesis 2/2 brain tumor removal in . Pt presents with associated impairments, including RLE synergy patterning, abnormal gait pattern, reduced gait speed, reduced LE strength/power, impaired functional mobility, and fall risk. Currently, Utilizing quad cane and AFO with household and community ambulation and has been very steady over the past month. Upon re assessment, patient has made good gains improving 5x STS from 16.10 sec to 12.69 sec, TUG decreased from 17.75sec to 13.23 sec, gait speed improved from .65 m/s to .71 m/s, and 6MWT increased from 600ft to 710ft with SBQC. Min Score has remained the same and ABC confidence scale has slightly decreased from 56% to 51%. She has met 5/5 STG and 6/8 LTG since her IE indicating an overall good outcome. She has reached maximal benefit from skilled PT at this time. Plan will be to discharge next visit with an updated HEP until she receives Botox injections mid August. She will be  re assessed at the end of August to determine her POC going forward.     Understanding of Dx/Px/POC: Good  Prognosis: Good    Patient verbalized understanding of POC.    Please contact me if you have any questions or recommendations. Thank you for the referral and the opportunity to share in Izabela Moeller's care.        Plan  Plan details:   Patient would benefit from: Skilled PT  Planned modality interventions: Biofeedback, Cryotherapy, TENS, Thermotherapy  Planned therapy interventions: Abdominal trunk stabilization, ADL training, Balance, Balance/WB training, Breathing training, Body mechanics training, Coordination, Functional ROM exercises, Gait training, HEP, Joint Mobilization, Manual Therapy, Brock taping, Motor coordination training, Neuromuscular re-education, Patient education, Postural training, Strengthening, Stretching, Therapeutic activities, Therapeutic exercises, Therapeutic training, Transfer training, Activity modification, Work reintegration  Frequency: 1-2x/wk  Duration in weeks: 12 weeks  Plan of Care beginning date: 6/24/2025  Plan of Care expiration date: 3 months - 9/24/2025  Treatment plan discussed with: Patient       Goals  Short Term Goals (4 weeks):    - Patient will improve time on TUG by 2.9 seconds to facilitate improved safety in all ambulation met  - Patient will be independent in basic HEP 2-3 weeks met  - Patient will improve 5xSTS score by 2.3 seconds to promote improved LE functional strength needed for ADLs met  - Patient will perform TRIMBLE met  - Patient will perform 6MWT met    Long Term Goals (12 weeks):  - Patient will be independent in a comprehensive home exercise program Ongoing   - Patient will improve gait speed by 0.18 m/s to improve safety with community ambulation MET (.19m/s)  - Patient will improve TRIMBLE by 6 points in order to improve static balance and reduce risk for falls Met  - Patient will improve 6 Minute Walk Test score by 190 feet to promote  improved cardiovascular endurance Unmet   - Patient will report 50% reduction in near falls in order to improve safety with functional tasks and reduce his risk for falls Met   - Patient will report going on walks at least 3 days per week to promote independence and improved cardiovascular endurance Met   - Patient will be able to ascend/descend stairs reciprocally with 1 UE assist to promote independence and safety with ADLs Met   - Patient will report 50% reduction in near falls when ambulating on uneven terrain Met       Cut off score    All date taken from [facility]TA Neuro Section or Rehab Measures      Min/56  MDC: 6 pts  Age Norms:  60-69: M - 55   F - 55  70-79: M - 54   F - 53  80-89: M - 53   F - 50 5xSTS: Tod et al 2010  MDC: 2.3 sec  Age Norms:  60-69: 11.4 sec  70-79: 12.6 sec  80-89: 14.8 sec   TUG  MDC: 4.14 sec  Cut off score:  >13.5 sec community dwelling adults  >32.2 frail elderly  <20 I for basic transfers  >30 dependent on transfers 10 Meter Walk Test: Annamaria et al 2011  MDC: 0.18 m/s  20-29: M - 1.35 m   F - 1.34 m  30-39: M - 1.43 m   F - 1.34 m  40-49: M - 1.43 m   F - 1.39 m  50-59: M - 1.43 m   F - 1.31 m  60-69: M - 1.34 m   F - 1.24 m  70-79: M - 1.26 m   F - 1.13 m  80-89: M - 0.97 m   F - 0.94 m    Household Ambulator < 0.4 m/s  Limited Community Ambulator 0.4 - 0.8 m/s  Community Ambulator 0.8 - 1.2 m/s  Safely cross the street > 1.2 m/s   FGA  MCID: 4 pts  Geriatrics/community < 22/30 fall risk  Geriatrics/community < 20/30 unexplained falls    DGI  MDC: vestibular - 4 pts  MDC: geriatric/community - 3 pts  Falls risk <19/24 mCTSIB  Norm: 20-60 yrs  Eyes open firm: norm sway 0.21-0.48  Eyes closed firm: norm sway 0.48-0.99  Eyes open foam: norm sway 0.38-0.71  Eyes closed foam: norm sway 0.70-2.22   6 Minute Walk Test  MDC: 190.98 ft  MCID: 164 ft    Age Norms  60-69: M - 1876 ft (571.80 m)  F - 1765 ft (537.98 m)  70-79: M - 1729 ft (527.00 m)  F - 1545 ft (470.92  m)  80-89: M - 1368 ft (416.97 m)  F - 1286 ft (391.97 m) ABC: Yves & Soto, 2003  <67% increased risk for falls   [place] Monofilaments  Evaluator Size:        Force (grams):          Hand/Dorsal Thresholds:        Plantar Thresholds:  - 1.65                       - 0.008                       - Normal                                 - Normal  - 2.36                       - 0.02                         - Normal                                 - Normal  - 2.44                       - 0.04                         - Normal                                 - Normal  - 2.83                       - 0.07                         - Normal                                 - Normal  - 3.22                       - 0.16                         - Diminished light touch          - Normal  - 3.61                       - 0.40                         - Diminished light touch          - Normal  - 3.84                       - 0.60                         - Diminished protective           - Diminished light touch  - 4.08                       - 1.00                         - Diminished protective           - Diminished light touch  - 4.17                       - 1.40                         - Diminished protective           - Diminished light touch  - 4.31                       - 2.00                         - Diminished protective           - Diminished light touch  - 4.56                       - 4.00                         - Loss of protective sense      - Diminished protective  - 4.74                       - 6.00                         - Loss of protective sense      - Diminished protective  - 4.93                       - 8.00                         - Loss of protective sense      - Diminished protective  - 5.07                       - 10.0                         - Loss of protective sense     - Loss of protective sense  - 5.18                       - 15.0                         - Loss of protective sense     -  Loss of protective sense  - 5.46                       - 26.0                         - Loss of protective sense     - Loss of protective sense  - 5.88                       - 60.0                         - Loss of protective sense     - Loss of protective sense  - 6.10                       - 100                          - Loss of protective sense     - Loss of protective sense  - 6.45                       - 180                          - Loss of protective sense     - Loss of protective sense  - 6.65                       - 300                          - Deep pressure sense only  - Deep pressure sense only       Subjective    History of Present Illness  - Mechanism of injury: Meningioma s/p L frontoparietal craniotomy (2020) resulting in residual R-sided heaviness, weakness (LE > UE), numbness, and tingling. Patient arrives with primary complaint of lack of toe movement (absent for past year) and excessive supination. Shares it has worsened in recent months.     Update 2/27/25: Denies falls or changes in past month. Got a new script for a brace due to issues with current one with noted issues with toes curling still.     Update 3/27/25: Reports PT feels about the same. Feels that her walking capabilities. Feels that that she would benefit from doing some mat table exercises. Feels that would be beneficial for her strength.  When her tone is bad and her toes want to curl, that really impacts her ability to walk.     Update 4/29/2025: Reports feeling tight and off balance today.  Toes curl during session but she is able to adapt to maintain good balance this session.    Update 5/29/2025: Patient denies falls in past month. Reports same: endurance, balance, strength. Botox injections received on 5/12 (approx 2.5 weeks ago); will see  in July for updates, with possible full injection in August 2025.     Update 6/24/2025:  Pt reports having a harder time today due to the heat and swelling in the right leg. Had  adjustment to brace today which seems to feel a little better per pt.     Update 7/22/25: Patient reports she is getting Botox injections mid August. She states she would be more active if the weather permitted, however, swelling in R ankle increases with heat.     - Primary AD: narrow base QC  - Assist level at home: mod IND (needs assistance with R AFO don)  - Decreased fine motor tasks: No    Patient goal:   Get the toes moving again  Reduce inversion/supination  Reduce heaviness in R LE    Pain  - Location: R-sided LBP  - Aggravating factors: rotation    Social Support  - Steps to enter house: 1 VIOLET  - Stairs in house: 13 with R HR, inc performance time  - Lives in: [facility]  - Lives with:  and son    - Employment status: retired retail sales  - Hand dominance: R-handed    Treatments  - Previous treatment: outpatient PT  - Current treatment: neurology  - Diagnostic Testing: see chart      Objective     LE MMT (seated MMT) updated 6/24/25  - R Hip Flexion: 4-/5   - L Hip Flexion: 5/5  - R Hip Extension: 5/5   - L Hip Extension: 5/5  - R Hip Abduction: 4/5   - L Hip abduction: 4/5  - R Hip adduction: 4/5  - L Hip adduction: 4+/5  - R Knee Extension: 4-/5  - L Knee Extension: 4+/5  - R Knee Flexion: 3+/5  - L Knee Flexion: 5/5  - R Ankle DF: AFO   - L Ankle DF: 5/5  - R Ankle PF: AFO   - L Ankle PF: 5/5    Sensation updated 6/24/25  - Light touch: wfl  - Deep pressure: wfl    Coordination: 6/24/2025  - Heel to Shin: unable   - Alternate Toe Taps: unable   - Finger to Nose: slightly dysmetric on R, improved accuracy with reduced speed    Reflexes/Clonus (NT)   - Clonus: No    Modified Marlo Updated: 6/24/2025  - R knee extension: 2 - easily moved, but more marked tone throughout most ROM   - L knee extension: 0 - no increase in tone  - R knee flexion: 1 - catch and release or minimum resistance at end range    - L knee flexion: 0 - no increase in tone  - R ankle DF: 1 - catch and release or minimum resistance  at end range     - L ankle DF: 0 - no increase in tone  - R ankle inversion: 1 - catch and release or minimum resistance at end range    - L ankle inversion: 0 - no increase in tone  - R ankle eversion: 1+ - catch followed by minimum resistance throughout remainder (< half ROM) - L ankle eversion: 0 - no increase in tone    Patient has made good gains improving 5x STS from 16.10 sec to 12.69 sec, TUG decreased from 17.75sec to 13.23 sec, gait speed improved from .65 m/s to .71 m/s, and 6MWT increased from 600ft to 710ft with SBQC. Trimble Score has remained the same and ABC confidence scale has slightly decreased from 56% to 51%.          Outcome Measures Initial Eval  12/30 1/30 2/27/25 3/27/25  4/29/25 5/29 6/24/25 7/22/25   5xSTS 16.95 sec no UE, 5/7 reps (2 failed due to insufficient ant WS and poor R foot placement) 11.30s no UE assist, continued difficulty with anterior WS 10.61s no UE A  9.5s no UE  15s, no UE 14.06s no UE 16.10s no UE 12.69s no UE   TUG  - Regular     19.98 sec with QC     15.77s with QC   16.06 sec    14.09s QC  19s WBQC   15.50s w SBQC   17.75sec w SBQC 13.23sec w SBQC   10 meter 0.39 m/s with QC 0.53 m/s with QC .51` m/s w QC 0.48 m/s w QC  0.52 m/s 0.62 m/s w SBQC .65 m/s w SBQC .71 m/s w SBQC   TRIMBLE NV 36/56 37/56 42/56 48/56 48/56  48/56   FGA defer defer         DGI defer defer         mCTSIB  - FTEO (firm)  - FTEC (firm)  - FTEO (foam)  - FTEC (foam)   defer   defer   30 sec   30 sec   3 sec   LOB    30 sec  30 sec  30  22 seconds 30 sec all positions   Defer  30 sec all positions 30 sec   30 sec  30 sec  10 sec   6MWT  ft 700 ft  660 ft  600 ft 680 ft w SBCQ 600ft w SBQC 710ft w SBQC   ABC 45% 43.13%  58.13%  57.5% 56.25%  51.88%                         Precautions: epilepsy, hx meningioma, arthritis, pneumonia     Past Medical History:   Diagnosis Date    Arthritis     L shoulder    Brain compression (HCC) 9/11/2020    Hypercholesteremia     Pneumonia

## 2025-07-24 ENCOUNTER — APPOINTMENT (OUTPATIENT)
Age: 75
End: 2025-07-24
Payer: MEDICARE

## 2025-07-29 ENCOUNTER — APPOINTMENT (OUTPATIENT)
Age: 75
End: 2025-07-29
Payer: MEDICARE

## 2025-07-31 ENCOUNTER — APPOINTMENT (OUTPATIENT)
Age: 75
End: 2025-07-31
Payer: MEDICARE

## (undated) DEVICE — INTENDED FOR TISSUE SEPARATION, AND OTHER PROCEDURES THAT REQUIRE A SHARP SURGICAL BLADE TO PUNCTURE OR CUT.: Brand: BARD-PARKER ® CARBON RIB-BACK BLADES

## (undated) DEVICE — PETRI DISH STERILE

## (undated) DEVICE — 3M™ IOBAN™ 2 ANTIMICROBIAL INCISE DRAPE 6650EZ: Brand: IOBAN™ 2

## (undated) DEVICE — ANTIBACTERIAL VIOLET BRAIDED (POLYGLACTIN 910), SYNTHETIC ABSORBABLE SUTURE: Brand: COATED VICRYL

## (undated) DEVICE — DRAPE MICROSCOPE OPMI PENTERO

## (undated) DEVICE — SUT NUROLON 4-0 TF CR/8 18 IN C584D

## (undated) DEVICE — RANEY SCALP CLIP STERILE: Brand: AESCULAP

## (undated) DEVICE — HEMOSTATIC MATRIX SURGIFLO 8ML W/THROMBIN

## (undated) DEVICE — DRESSING ALLEVYN LIFE SACRAL 6.75 X 6.5 IN

## (undated) DEVICE — TUBING SUCTION 5MM X 12 FT

## (undated) DEVICE — DURASEAL 5ML

## (undated) DEVICE — TOOL F2/8TA23 LEGEND 8CM 2.3MM TAPER: Brand: MIDAS REX™

## (undated) DEVICE — GLOVE SRG BIOGEL ECLIPSE 8

## (undated) DEVICE — SURGICEL 4 X 8

## (undated) DEVICE — INTENDED FOR TISSUE SEPARATION, AND OTHER PROCEDURES THAT REQUIRE A SHARP SURGICAL BLADE TO PUNCTURE OR CUT.: Brand: BARD-PARKER SAFETY BLADES SIZE 10, STERILE

## (undated) DEVICE — NEURO PATTIES 1/2 X 1/2

## (undated) DEVICE — DRAPE CAMERA/LASER

## (undated) DEVICE — SUT MONOCRYL 2-0 SH 27 IN Y417H

## (undated) DEVICE — SPECIMEN CONTAINER STERILE PEEL PACK

## (undated) DEVICE — GOWN,SLEEVE,STERILE,W/CSR WRAP,1/P: Brand: MEDLINE

## (undated) DEVICE — SURGIFOAM 8.5 X 12.5

## (undated) DEVICE — SUT MONOCRYL PLUS 3-0 PS-2 27 IN MCP427H

## (undated) DEVICE — GLOVE INDICATOR PI UNDERGLOVE SZ 8 BLUE

## (undated) DEVICE — TRAY FOLEY 16FR URIMETER SURESTEP

## (undated) DEVICE — DRAPE SHEET THREE QUARTER

## (undated) DEVICE — MAYFIELD® DISPOSABLE ADULT SKULL PIN (PLASTIC BASE): Brand: MAYFIELD®

## (undated) DEVICE — PACK CRANIOTOMY PBDS RF

## (undated) DEVICE — TOOL 9MH30 LEGEND 9CM 3MM MH: Brand: MIDAS REX

## (undated) DEVICE — BETADINE OINTMENT FOIL PACK

## (undated) DEVICE — PROXIMATE PLUS MD MULTI-DIRECTIONAL RELEASE SKIN STAPLERS CONTAINS 35 STAINLESS STEEL STAPLES APPROXIMATE CLOSED DIMENSIONS: 6.9MM X 3.9MM WIDE: Brand: PROXIMATE

## (undated) DEVICE — MARKER REFLECTIVE RADIOPAQUE SPHERE

## (undated) DEVICE — LIGHT HANDLE COVER SLEEVE DISP BLUE STELLAR

## (undated) DEVICE — NEURO PATTIES 1/2 X 1 1/2

## (undated) DEVICE — Device: Brand: IQ SYSTEM

## (undated) DEVICE — PREP SURGICAL PURPREP 26ML

## (undated) DEVICE — SPONGE PVP SCRUB WING STERILE

## (undated) DEVICE — DRAPE INTESTINAL ISOLATION BAG